# Patient Record
Sex: FEMALE | Race: ASIAN | NOT HISPANIC OR LATINO
[De-identification: names, ages, dates, MRNs, and addresses within clinical notes are randomized per-mention and may not be internally consistent; named-entity substitution may affect disease eponyms.]

---

## 2018-09-05 PROBLEM — Z00.00 ENCOUNTER FOR PREVENTIVE HEALTH EXAMINATION: Status: ACTIVE | Noted: 2018-09-05

## 2018-09-11 ENCOUNTER — APPOINTMENT (OUTPATIENT)
Dept: ORTHOPEDIC SURGERY | Facility: CLINIC | Age: 78
End: 2018-09-11
Payer: MEDICARE

## 2018-09-11 VITALS
HEIGHT: 61 IN | HEART RATE: 66 BPM | WEIGHT: 180 LBS | SYSTOLIC BLOOD PRESSURE: 164 MMHG | DIASTOLIC BLOOD PRESSURE: 72 MMHG | BODY MASS INDEX: 33.99 KG/M2

## 2018-09-11 DIAGNOSIS — S86.011S STRAIN OF RIGHT ACHILLES TENDON, SEQUELA: ICD-10-CM

## 2018-09-11 DIAGNOSIS — M76.61 ACHILLES TENDINITIS, RIGHT LEG: ICD-10-CM

## 2018-09-11 PROCEDURE — 99203 OFFICE O/P NEW LOW 30 MIN: CPT

## 2021-08-10 ENCOUNTER — APPOINTMENT (OUTPATIENT)
Dept: VASCULAR SURGERY | Facility: CLINIC | Age: 81
End: 2021-08-10
Payer: MEDICARE

## 2021-08-10 VITALS — HEART RATE: 82 BPM | SYSTOLIC BLOOD PRESSURE: 146 MMHG | DIASTOLIC BLOOD PRESSURE: 71 MMHG

## 2021-08-10 PROCEDURE — 99203 OFFICE O/P NEW LOW 30 MIN: CPT

## 2021-08-10 NOTE — CONSULT LETTER
[Dear  ___] : Dear  [unfilled], [Consult Letter:] : I had the pleasure of evaluating your patient, [unfilled]. [Please see my note below.] : Please see my note below. [Consult Closing:] : Thank you very much for allowing me to participate in the care of this patient.  If you have any questions, please do not hesitate to contact me. [Sincerely,] : Sincerely, [FreeTextEntry3] : Golden Iniguez M.D., MAYELIN., R.P.V.I.\par \par  [facility]\par  of  Surgery\par Assistant Professor of Radiology\par Vascular Surgery at [place]

## 2021-08-10 NOTE — ASSESSMENT
[FreeTextEntry1] : In the office today I took the opportunity to evaluate the CT scan which shows a slightly enlarged right internal jugular vein.  There is no other pathology present.  I do not believe this is the cause of the patient's neck and jaw pain, most likely this is due to right temporomandibular joint dislocation.  No need for further intervention although, she may benefit from a dental consultation for the jaw issue.

## 2021-08-10 NOTE — HISTORY OF PRESENT ILLNESS
[FreeTextEntry1] : 81-year-old female recently began complaining of right neck pain extending along her right jaw to the back of her head.  This necessitated a CT scan which showed no evidence of mass however, there was an asymmetrically enlarged right internal jugular vein.  In addition, there was degenerative changes of the right temporomandibular joint space with anterior dislocation.  Patient notes that her symptoms have recently begun to improve.  She is here for evaluation.

## 2021-08-10 NOTE — PHYSICAL EXAM
[JVD] : no jugular venous distention  [Normal Breath Sounds] : Normal breath sounds [Normal Rate and Rhythm] : normal rate and rhythm [1+] : left 1+ [Ankle Swelling (On Exam)] : not present [Varicose Veins Of Lower Extremities] : not present [] : not present [Abdomen Tenderness] : ~T ~M No abdominal tenderness [No Rash or Lesion] : No rash or lesion [Skin Ulcer] : no ulcer [Alert] : alert [Calm] : calm [de-identified] : Appears well

## 2022-06-09 ENCOUNTER — APPOINTMENT (OUTPATIENT)
Dept: ORTHOPEDIC SURGERY | Facility: CLINIC | Age: 82
End: 2022-06-09
Payer: MEDICARE

## 2022-06-09 VITALS — WEIGHT: 180 LBS | BODY MASS INDEX: 33.99 KG/M2 | HEIGHT: 61 IN

## 2022-06-09 DIAGNOSIS — E78.00 PURE HYPERCHOLESTEROLEMIA, UNSPECIFIED: ICD-10-CM

## 2022-06-09 PROCEDURE — 99213 OFFICE O/P EST LOW 20 MIN: CPT

## 2022-06-09 RX ORDER — FERROUS GLUCONATE 324(38)MG
324 (38 FE) TABLET ORAL
Refills: 0 | Status: ACTIVE | COMMUNITY

## 2022-06-09 RX ORDER — METFORMIN HYDROCHLORIDE 1000 MG/1
1000 TABLET, COATED ORAL
Refills: 0 | Status: ACTIVE | COMMUNITY

## 2022-06-09 RX ORDER — ACETAMINOPHEN AND CODEINE PHOSPHATE 300; 15 MG/1; MG/1
300-15 TABLET ORAL EVERY 8 HOURS
Qty: 25 | Refills: 0 | Status: ACTIVE | COMMUNITY
Start: 2022-06-09 | End: 1900-01-01

## 2022-06-09 RX ORDER — LOSARTAN POTASSIUM 50 MG/1
50 TABLET, FILM COATED ORAL
Refills: 0 | Status: ACTIVE | COMMUNITY

## 2022-06-09 RX ORDER — BACLOFEN 10 MG/1
10 TABLET ORAL
Refills: 0 | Status: ACTIVE | COMMUNITY

## 2022-06-09 RX ORDER — OMEPRAZOLE 20 MG/1
20 CAPSULE, DELAYED RELEASE ORAL
Refills: 0 | Status: ACTIVE | COMMUNITY

## 2022-06-09 RX ORDER — GLIPIZIDE 5 MG/1
5 TABLET ORAL
Refills: 0 | Status: ACTIVE | COMMUNITY

## 2022-06-09 RX ORDER — ATORVASTATIN CALCIUM 10 MG/1
10 TABLET, FILM COATED ORAL
Refills: 0 | Status: ACTIVE | COMMUNITY

## 2022-06-09 RX ORDER — CHROMIUM 200 MCG
TABLET ORAL
Refills: 0 | Status: ACTIVE | COMMUNITY

## 2022-06-09 RX ORDER — GABAPENTIN 300 MG/1
300 CAPSULE ORAL
Refills: 0 | Status: ACTIVE | COMMUNITY

## 2022-06-09 NOTE — HISTORY OF PRESENT ILLNESS
[6] : 6 [8] : 8 [Shooting] : shooting [] : yes [de-identified] : Patient presents with exacerbation of low back pain for the past month. No new injury. Describes left glut pain that radiates to the left leg. Occasional numbness. Symptoms worse with ambulation. She has been using ice and Aleve/Tylenol with temporary relief. Recent HgA1c was 6.7.\par \par MRI 2021 with severe lumbar disc disease [FreeTextEntry1] : left leg [FreeTextEntry5] : pt is following up on her left leg and states that her pain starts from her top led down to her feet and her calf hurts a lot as well [FreeTextEntry7] : down her left feet [de-identified] : walking and standing

## 2022-06-09 NOTE — DISCUSSION/SUMMARY
[de-identified] : The patient was advised of the diagnosis. The natural history of the pathology was explained in full to the patient in layman's terms. All questions were answered. The risks and benefits of surgical and non-surgical treatment alternatives were explained in full to the patient.\par \par stop baclofen and try tylenol #3. understand to not mix meds. cortisone may be needed for hip. may need new MRI lumbar spine and may need referral to pain management , but will try and avoid  due to diabetes\par \par Timing and frequency of medication has been discussed with patient.\par I have consulted the  registry for the purpose of reviewing the patients controlled substance.\par \par Progress note completed by ANTONIA Brown.\par \par Physician Instructions:\par The documentation recorded by the PA accurately reflects the service I personally performed and decisions made by me. offered and to consider inj left hip, would need BS below 140 to do this .she will try and get to PT.. Limiting Tylenol #2 explained and to never take it within 4 hours of Baclofen\par

## 2022-06-15 ENCOUNTER — APPOINTMENT (OUTPATIENT)
Dept: ORTHOPEDIC SURGERY | Facility: CLINIC | Age: 82
End: 2022-06-15

## 2022-06-15 VITALS — BODY MASS INDEX: 33.99 KG/M2 | HEIGHT: 61 IN | WEIGHT: 180 LBS

## 2022-06-15 PROCEDURE — 99214 OFFICE O/P EST MOD 30 MIN: CPT | Mod: 25

## 2022-06-15 PROCEDURE — 20552 NJX 1/MLT TRIGGER POINT 1/2: CPT

## 2022-06-15 NOTE — PROCEDURE
[FreeTextEntry1] : low back tigger point injection of 2 cc of lidocaine, 4 mg decadron and 5 mg kenalog. risk in diabetic of raising blood suar. Insits her BS today is 85. Her A1c has been 6.7

## 2022-06-15 NOTE — DISCUSSION/SUMMARY
[de-identified] : \par \par Progress note completed by ANTONIA Brown.\par \par Physician Instructions:\par The documentation recorded by the PA accurately reflects the service I personally performed and decisions made by me. offered and to consider inj left hip, would need BS below 140 to do this .she will try and get to PT.. Limiting Tylenol #2 explained and to never take it within 4 hours of Baclofen\par

## 2022-06-15 NOTE — HISTORY OF PRESENT ILLNESS
[Left Leg] : left leg [6] : 6 [8] : 8 [] : yes [Standing] : standing [Walking] : walking [de-identified] : Follow up today. Symptoms continue without change. Notes significant pain at night. She did not  rx at the pharmacy or start PT. states Blood Sugar is 85 and will like trigger point injection [FreeTextEntry7] : left foot [FreeTextEntry1] : back

## 2022-06-15 NOTE — REASON FOR VISIT
[Family Member] : family member [FreeTextEntry2] : Patient is here for a Follow Up appointment for the Back and Left Leg.

## 2022-07-27 ENCOUNTER — APPOINTMENT (OUTPATIENT)
Dept: ORTHOPEDIC SURGERY | Facility: CLINIC | Age: 82
End: 2022-07-27

## 2022-07-28 ENCOUNTER — APPOINTMENT (OUTPATIENT)
Dept: ORTHOPEDIC SURGERY | Facility: CLINIC | Age: 82
End: 2022-07-28

## 2022-08-24 ENCOUNTER — APPOINTMENT (OUTPATIENT)
Dept: ORTHOPEDIC SURGERY | Facility: CLINIC | Age: 82
End: 2022-08-24

## 2022-08-24 VITALS — BODY MASS INDEX: 33.99 KG/M2 | HEIGHT: 61 IN | WEIGHT: 180 LBS

## 2022-08-24 DIAGNOSIS — M70.62 TROCHANTERIC BURSITIS, LEFT HIP: ICD-10-CM

## 2022-08-24 DIAGNOSIS — M51.9 UNSPECIFIED THORACIC, THORACOLUMBAR AND LUMBOSACRAL INTERVERTEBRAL DISC DISORDER: ICD-10-CM

## 2022-08-24 DIAGNOSIS — M54.16 RADICULOPATHY, LUMBAR REGION: ICD-10-CM

## 2022-08-24 PROCEDURE — 99213 OFFICE O/P EST LOW 20 MIN: CPT

## 2022-08-24 NOTE — HISTORY OF PRESENT ILLNESS
[Left Leg] : left leg [10] : 10 [7] : 7 [Dull/Aching] : dull/aching [Radiating] : radiating [Sharp] : sharp [Shooting] : shooting [Tingling] : tingling [Constant] : constant [Leisure] : leisure [Injection therapy] : injection therapy [de-identified] : Follow up today. Symptoms have been exacerbated over the past 2 weeks. No new injury. Describes low back pain with radiation to the left leg with numbness/tingling. She saw Dr. Corona, who treated with LESI on 7/15/22 (roughly 3 weeks of relief). Minimal relief with Tylenol #3. Now getting at home PT once a week. [] : Post Surgical Visit: no [FreeTextEntry1] : Left leg [FreeTextEntry6] : Numbness [FreeTextEntry7] : Left leg

## 2022-08-24 NOTE — DISCUSSION/SUMMARY
[de-identified] : The patient was advised of the diagnosis. The natural history of the pathology was explained in full to the patient in layman's terms. All questions were answered. The risks and benefits of surgical and non-surgical treatment alternatives were explained in full to the patient.\par \par Will follow up with Dr. Corona regarding repeat LESI.. Explained she will likely need 3 epidurals this year. With current pain advised to take the tylenol #3 that I gave her and also to realize risk of pain meds making her dizzy and or constipated . explained to daughter and patient .\par \par Continue Home PT.

## 2022-10-30 ENCOUNTER — EMERGENCY (EMERGENCY)
Facility: HOSPITAL | Age: 82
LOS: 0 days | Discharge: HOME | End: 2022-10-31
Attending: STUDENT IN AN ORGANIZED HEALTH CARE EDUCATION/TRAINING PROGRAM

## 2022-10-30 VITALS
SYSTOLIC BLOOD PRESSURE: 180 MMHG | RESPIRATION RATE: 18 BRPM | TEMPERATURE: 98 F | DIASTOLIC BLOOD PRESSURE: 85 MMHG | HEART RATE: 82 BPM | HEIGHT: 60 IN | WEIGHT: 182.1 LBS | OXYGEN SATURATION: 96 %

## 2022-10-30 DIAGNOSIS — Z88.0 ALLERGY STATUS TO PENICILLIN: ICD-10-CM

## 2022-10-30 DIAGNOSIS — G89.29 OTHER CHRONIC PAIN: ICD-10-CM

## 2022-10-30 DIAGNOSIS — M54.9 DORSALGIA, UNSPECIFIED: ICD-10-CM

## 2022-10-30 DIAGNOSIS — R10.30 LOWER ABDOMINAL PAIN, UNSPECIFIED: ICD-10-CM

## 2022-10-30 DIAGNOSIS — I10 ESSENTIAL (PRIMARY) HYPERTENSION: ICD-10-CM

## 2022-10-30 DIAGNOSIS — R39.15 URGENCY OF URINATION: ICD-10-CM

## 2022-10-30 DIAGNOSIS — M54.32 SCIATICA, LEFT SIDE: ICD-10-CM

## 2022-10-30 DIAGNOSIS — E11.9 TYPE 2 DIABETES MELLITUS WITHOUT COMPLICATIONS: ICD-10-CM

## 2022-10-30 DIAGNOSIS — Z88.8 ALLERGY STATUS TO OTHER DRUGS, MEDICAMENTS AND BIOLOGICAL SUBSTANCES: ICD-10-CM

## 2022-10-30 DIAGNOSIS — R30.0 DYSURIA: ICD-10-CM

## 2022-10-30 DIAGNOSIS — Z20.822 CONTACT WITH AND (SUSPECTED) EXPOSURE TO COVID-19: ICD-10-CM

## 2022-10-30 PROCEDURE — 99285 EMERGENCY DEPT VISIT HI MDM: CPT

## 2022-10-30 RX ORDER — MORPHINE SULFATE 50 MG/1
4 CAPSULE, EXTENDED RELEASE ORAL ONCE
Refills: 0 | Status: DISCONTINUED | OUTPATIENT
Start: 2022-10-30 | End: 2022-10-30

## 2022-10-30 RX ORDER — SODIUM CHLORIDE 9 MG/ML
1000 INJECTION INTRAMUSCULAR; INTRAVENOUS; SUBCUTANEOUS ONCE
Refills: 0 | Status: COMPLETED | OUTPATIENT
Start: 2022-10-30 | End: 2022-10-30

## 2022-10-30 NOTE — ED ADULT NURSE NOTE - OBJECTIVE STATEMENT
A&Ox4, c/o lower left side back pain. Pt states pain for past 2 weeks on left side of back, radiating down left leg. Pt states pain in suprapubic region. Pt states increased frequency, and dribbling noted. Pt states pain has been progressively getting worse. pt denies: pain with urination, fever, chills, nausea, vomiting, weakness, blurry vision, headache, chest pain, sob.

## 2022-10-30 NOTE — ED ADULT TRIAGE NOTE - CHIEF COMPLAINT QUOTE
Patient c/o pain to left flank, lower abdomen, dysuria x 3 days. Denies n/v/d. Pmh sciatica, RA. Epidural 2 months ago.

## 2022-10-31 VITALS
SYSTOLIC BLOOD PRESSURE: 160 MMHG | HEART RATE: 88 BPM | RESPIRATION RATE: 16 BRPM | OXYGEN SATURATION: 95 % | TEMPERATURE: 98 F | DIASTOLIC BLOOD PRESSURE: 79 MMHG

## 2022-10-31 LAB
ALBUMIN SERPL ELPH-MCNC: 4 G/DL — SIGNIFICANT CHANGE UP (ref 3.3–5)
ALP SERPL-CCNC: 59 U/L — SIGNIFICANT CHANGE UP (ref 40–120)
ALT FLD-CCNC: 23 U/L — SIGNIFICANT CHANGE UP (ref 12–78)
ANION GAP SERPL CALC-SCNC: 6 MMOL/L — SIGNIFICANT CHANGE UP (ref 5–17)
APPEARANCE UR: CLEAR — SIGNIFICANT CHANGE UP
AST SERPL-CCNC: 19 U/L — SIGNIFICANT CHANGE UP (ref 15–37)
BASOPHILS # BLD AUTO: 0.06 K/UL — SIGNIFICANT CHANGE UP (ref 0–0.2)
BASOPHILS NFR BLD AUTO: 0.7 % — SIGNIFICANT CHANGE UP (ref 0–2)
BILIRUB SERPL-MCNC: 0.6 MG/DL — SIGNIFICANT CHANGE UP (ref 0.2–1.2)
BILIRUB UR-MCNC: NEGATIVE — SIGNIFICANT CHANGE UP
BUN SERPL-MCNC: 16 MG/DL — SIGNIFICANT CHANGE UP (ref 7–23)
CALCIUM SERPL-MCNC: 9.9 MG/DL — SIGNIFICANT CHANGE UP (ref 8.5–10.1)
CHLORIDE SERPL-SCNC: 108 MMOL/L — SIGNIFICANT CHANGE UP (ref 96–108)
CO2 SERPL-SCNC: 27 MMOL/L — SIGNIFICANT CHANGE UP (ref 22–31)
COLOR SPEC: YELLOW — SIGNIFICANT CHANGE UP
CREAT SERPL-MCNC: 0.82 MG/DL — SIGNIFICANT CHANGE UP (ref 0.5–1.3)
DIFF PNL FLD: NEGATIVE — SIGNIFICANT CHANGE UP
EGFR: 72 ML/MIN/1.73M2 — SIGNIFICANT CHANGE UP
EOSINOPHIL # BLD AUTO: 0.18 K/UL — SIGNIFICANT CHANGE UP (ref 0–0.5)
EOSINOPHIL NFR BLD AUTO: 2 % — SIGNIFICANT CHANGE UP (ref 0–6)
FLUAV AG NPH QL: SIGNIFICANT CHANGE UP
FLUBV AG NPH QL: SIGNIFICANT CHANGE UP
GLUCOSE SERPL-MCNC: 158 MG/DL — HIGH (ref 70–99)
GLUCOSE UR QL: NEGATIVE MG/DL — SIGNIFICANT CHANGE UP
HCT VFR BLD CALC: 39.8 % — SIGNIFICANT CHANGE UP (ref 34.5–45)
HGB BLD-MCNC: 12.3 G/DL — SIGNIFICANT CHANGE UP (ref 11.5–15.5)
IMM GRANULOCYTES NFR BLD AUTO: 0.3 % — SIGNIFICANT CHANGE UP (ref 0–0.9)
KETONES UR-MCNC: NEGATIVE — SIGNIFICANT CHANGE UP
LEUKOCYTE ESTERASE UR-ACNC: NEGATIVE — SIGNIFICANT CHANGE UP
LIDOCAIN IGE QN: 250 U/L — SIGNIFICANT CHANGE UP (ref 73–393)
LYMPHOCYTES # BLD AUTO: 2.47 K/UL — SIGNIFICANT CHANGE UP (ref 1–3.3)
LYMPHOCYTES # BLD AUTO: 27.4 % — SIGNIFICANT CHANGE UP (ref 13–44)
MCHC RBC-ENTMCNC: 26.5 PG — LOW (ref 27–34)
MCHC RBC-ENTMCNC: 30.9 G/DL — LOW (ref 32–36)
MCV RBC AUTO: 85.8 FL — SIGNIFICANT CHANGE UP (ref 80–100)
MONOCYTES # BLD AUTO: 0.57 K/UL — SIGNIFICANT CHANGE UP (ref 0–0.9)
MONOCYTES NFR BLD AUTO: 6.3 % — SIGNIFICANT CHANGE UP (ref 2–14)
NEUTROPHILS # BLD AUTO: 5.71 K/UL — SIGNIFICANT CHANGE UP (ref 1.8–7.4)
NEUTROPHILS NFR BLD AUTO: 63.3 % — SIGNIFICANT CHANGE UP (ref 43–77)
NITRITE UR-MCNC: NEGATIVE — SIGNIFICANT CHANGE UP
NRBC # BLD: 0 /100 WBCS — SIGNIFICANT CHANGE UP (ref 0–0)
PH UR: 6.5 — SIGNIFICANT CHANGE UP (ref 5–8)
PLATELET # BLD AUTO: 338 K/UL — SIGNIFICANT CHANGE UP (ref 150–400)
POTASSIUM SERPL-MCNC: 4.5 MMOL/L — SIGNIFICANT CHANGE UP (ref 3.5–5.3)
POTASSIUM SERPL-SCNC: 4.5 MMOL/L — SIGNIFICANT CHANGE UP (ref 3.5–5.3)
PROT SERPL-MCNC: 7.4 GM/DL — SIGNIFICANT CHANGE UP (ref 6–8.3)
PROT UR-MCNC: NEGATIVE MG/DL — SIGNIFICANT CHANGE UP
RBC # BLD: 4.64 M/UL — SIGNIFICANT CHANGE UP (ref 3.8–5.2)
RBC # FLD: 15.2 % — HIGH (ref 10.3–14.5)
SARS-COV-2 RNA SPEC QL NAA+PROBE: SIGNIFICANT CHANGE UP
SODIUM SERPL-SCNC: 141 MMOL/L — SIGNIFICANT CHANGE UP (ref 135–145)
SP GR SPEC: 1.01 — SIGNIFICANT CHANGE UP (ref 1.01–1.02)
UROBILINOGEN FLD QL: NEGATIVE MG/DL — SIGNIFICANT CHANGE UP
WBC # BLD: 9.02 K/UL — SIGNIFICANT CHANGE UP (ref 3.8–10.5)
WBC # FLD AUTO: 9.02 K/UL — SIGNIFICANT CHANGE UP (ref 3.8–10.5)

## 2022-10-31 PROCEDURE — 74177 CT ABD & PELVIS W/CONTRAST: CPT | Mod: 26,MA

## 2022-10-31 RX ORDER — ACETAMINOPHEN 500 MG
2 TABLET ORAL
Qty: 40 | Refills: 0
Start: 2022-10-31 | End: 2022-11-04

## 2022-10-31 RX ORDER — LIDOCAINE 4 G/100G
1 CREAM TOPICAL
Qty: 15 | Refills: 0
Start: 2022-10-31 | End: 2022-11-04

## 2022-10-31 RX ORDER — TIZANIDINE 4 MG/1
2 TABLET ORAL
Qty: 30 | Refills: 0
Start: 2022-10-31 | End: 2022-11-04

## 2022-10-31 RX ORDER — NITROFURANTOIN MACROCRYSTAL 50 MG
1 CAPSULE ORAL
Qty: 10 | Refills: 0
Start: 2022-10-31 | End: 2022-11-04

## 2022-10-31 RX ADMIN — MORPHINE SULFATE 4 MILLIGRAM(S): 50 CAPSULE, EXTENDED RELEASE ORAL at 00:25

## 2022-10-31 RX ADMIN — SODIUM CHLORIDE 1000 MILLILITER(S): 9 INJECTION INTRAMUSCULAR; INTRAVENOUS; SUBCUTANEOUS at 00:25

## 2022-10-31 NOTE — ED PROVIDER NOTE - OBJECTIVE STATEMENT
82-year-old female with past medical history of diabetes, chronic back pain sciatica RA presented to the ED with lower abdominal pain with associated urinary urgency and dysuria times several days without associated fever chills nausea vomiting diarrhea.  Patient denies any flank pain or back pain.  Patient also reports left buttock sciatica flare acute on chronic radiating to posterior left leg to just behind left knee.  Patient denies any other complaints.  .

## 2022-10-31 NOTE — ED PROVIDER NOTE - NS ED ROS FT
Constitutional: See HPI.  Eyes: No visual changes, eye pain or discharge. No Photophobia  ENMT: No hearing changes, pain, discharge or infections. No neck pain or stiffness. No limited ROM  Cardiac: No SOB or edema. No chest pain with exertion.  Respiratory: No cough or respiratory distress.   GI: see hpi   : No dysuria, frequency or burning. No Discharge  MS: see hpi   Neuro: No headache or weakness. No LOC.  Skin: No skin rash.  Except as documented in the HPI, all other systems are negative.

## 2022-10-31 NOTE — ED PROVIDER NOTE - PHYSICAL EXAMINATION
VITAL SIGNS: I have reviewed nursing notes and confirm.  CONSTITUTIONAL: well-appearing, non-toxic, NAD  SKIN: Warm dry, normal skin turgor  HEAD: NCAT  EYES: EOMI, PERRLA, no scleral icterus  ENT: Moist mucous membranes, normal pharynx with no erythema or exudates  NECK: Supple; non tender. Full ROM. No cervical LAD  CARD: RRR, no murmurs, rubs or gallops  RESP: clear to ausculation b/l.  No rales, rhonchi, or wheezing.  ABD: soft, + BS, suprapubic ttp, non-distended, no rebound or guarding. No CVA tenderness  EXT: left mid-buttock focal ttp   NEURO: normal motor. normal sensory. non-focal   PSYCH: Cooperative, appropriate.

## 2022-10-31 NOTE — ED PROVIDER NOTE - PATIENT PORTAL LINK FT
You can access the FollowMyHealth Patient Portal offered by [facility] by registering at the following website: [email or web address]. By joining [employer]’s FollowMyHealth portal, you will also be able to view your health information using other applications (apps) compatible with our system.

## 2022-10-31 NOTE — ED PROVIDER NOTE - NSFOLLOWUPINSTRUCTIONS_ED_ALL_ED_FT
You have a history of sciatica and should continue to take your home medication as prescribed. Please follow up with your outpatient physician

## 2022-10-31 NOTE — ED PROVIDER NOTE - CLINICAL SUMMARY MEDICAL DECISION MAKING FREE TEXT BOX
82-year-old female with past medical history of diabetes, chronic back pain sciatica RA presented to the ED with lower abdominal pain with associated urinary urgency and dysuria times several days without associated fever chills nausea vomiting diarrhea.  Patient denies any flank pain or back pain.  Patient also reports left buttock sciatica flare acute on chronic radiating to posterior left leg to just behind left knee.  Patient denies any other complaints.  .Likely UTI, will obtain CT abdomen pelvis for further evaluation given age and risk factors.  Acute on chronic flare of sciatica which patient has previously had.  Follows up with pain management as outpatient.

## 2022-11-01 LAB
CULTURE RESULTS: SIGNIFICANT CHANGE UP
SPECIMEN SOURCE: SIGNIFICANT CHANGE UP

## 2022-11-07 PROBLEM — I10 ESSENTIAL (PRIMARY) HYPERTENSION: Chronic | Status: ACTIVE | Noted: 2022-10-30

## 2022-11-07 PROBLEM — E11.9 TYPE 2 DIABETES MELLITUS WITHOUT COMPLICATIONS: Chronic | Status: ACTIVE | Noted: 2022-10-30

## 2022-11-18 ENCOUNTER — APPOINTMENT (OUTPATIENT)
Dept: UROGYNECOLOGY | Facility: CLINIC | Age: 82
End: 2022-11-18

## 2022-11-18 VITALS
DIASTOLIC BLOOD PRESSURE: 82 MMHG | BODY MASS INDEX: 35.34 KG/M2 | SYSTOLIC BLOOD PRESSURE: 158 MMHG | HEIGHT: 60 IN | WEIGHT: 180 LBS

## 2022-11-18 DIAGNOSIS — N32.81 OVERACTIVE BLADDER: ICD-10-CM

## 2022-11-18 DIAGNOSIS — K59.09 OTHER CONSTIPATION: ICD-10-CM

## 2022-11-18 LAB
BILIRUB UR QL STRIP: NORMAL
CLARITY UR: CLEAR
COLLECTION METHOD: NORMAL
GLUCOSE UR-MCNC: NORMAL
HCG UR QL: 0.2 EU/DL
HGB UR QL STRIP.AUTO: NORMAL
KETONES UR-MCNC: NORMAL
LEUKOCYTE ESTERASE UR QL STRIP: NORMAL
NITRITE UR QL STRIP: NORMAL
PH UR STRIP: 7
PROT UR STRIP-MCNC: NORMAL
SP GR UR STRIP: 1.01

## 2022-11-18 PROCEDURE — 51701 INSERT BLADDER CATHETER: CPT

## 2022-11-18 PROCEDURE — 99204 OFFICE O/P NEW MOD 45 MIN: CPT | Mod: 25

## 2022-11-18 RX ORDER — TROSPIUM CHLORIDE 60 MG/1
60 CAPSULE, EXTENDED RELEASE ORAL
Qty: 30 | Refills: 3 | Status: ACTIVE | COMMUNITY
Start: 2022-11-18 | End: 1900-01-01

## 2022-11-18 NOTE — HISTORY OF PRESENT ILLNESS
[FreeTextEntry1] : VIKTORIYA PHILLIPS  is a 82 year old P2 presenting for evaluation of urinary frequency, and pain in the pelvis/bladder x 1 month. Reports pain is sometimes related to urgency and sometimes not related. Has pain when she is sitting. Day and night. She also has significant frequency, particularly at night. She voids >7 times during the daytime and up to 5 times during the night. She drinks about 4-5 glasses of water daily, 1.5 cups of caffeinated tea, yogurt, and eat spicy foods. She drink tea in the evening as well. She reports occasional constipation but takes daily herbal stool softener. Occasional DERRELL. No dysuria, hematuria, recurrent UTIs.

## 2022-11-18 NOTE — DISCUSSION/SUMMARY
[FreeTextEntry1] : - Unclear etiology of pain, possible musculoskeletal vs actually bladder pain - Discussed etiology of OAB and behavioral modifications: avoidance of caffeine, spicy food, cessation of fluid intake 2 hours before bedtime. - Discussed medical management and will start Trospium. Side effects discussed and strategies to manage include stool softener/bowel regimen for constipation, eye drop for dry eye. No contraindications to anticholinergics.  - Follow-up in 1 month to re-assess after starting medication and behavioral changes. Aware to call earlier with any questions or concerns.

## 2022-11-18 NOTE — PROCEDURE
[Straight Catheterization] : insertion of a straight catheter [Urgent Incontinence] : urgent incontinence [Urinary Frequency] : urinary frequency [Patient] : the patient [Allergies Reviewed] : Allergies reviewed [None] : none [___ Fr Straight Tip] : a [unfilled] in Ecu[place]orean straight tip catheter

## 2022-11-18 NOTE — PHYSICAL EXAM
[FreeTextEntry1] : General: Well, appearing, no acute distress\par HEENT: Normocephalic, atraumatic\par Respiratory: Speaking in full sentences comfortably, normal work of breathing and no cough during visit\par Extremities: No upper extremity edema noted\par Skin: No obvious rash or skin lesions\par Neuro: Alert and oriented x 3, speech is fluent, normal rate\par Psych: Normal mood and affect [Vulvar Atrophy] : vulvar atrophy [Labia Majora] : were normal [Labia Minora] : were normal [Normal Appearance] : general appearance was normal [Atrophy] : atrophy [Dry Mucosa] : dry mucosa [Normal] : normal [Post Void Residual ____ml] : post void residual was [unfilled] ml [de-identified] : None [de-identified] : Has some suprapubic, lower abdominal pain and pain on palpation of pubic bone.

## 2022-12-23 ENCOUNTER — APPOINTMENT (OUTPATIENT)
Dept: UROGYNECOLOGY | Facility: CLINIC | Age: 82
End: 2022-12-23

## 2023-06-21 ENCOUNTER — APPOINTMENT (OUTPATIENT)
Dept: ENDOCRINOLOGY | Facility: CLINIC | Age: 83
End: 2023-06-21

## 2023-06-21 ENCOUNTER — APPOINTMENT (OUTPATIENT)
Dept: ENDOCRINOLOGY | Facility: CLINIC | Age: 83
End: 2023-06-21
Payer: MEDICARE

## 2023-06-21 VITALS
HEIGHT: 60 IN | OXYGEN SATURATION: 94 % | TEMPERATURE: 96 F | DIASTOLIC BLOOD PRESSURE: 70 MMHG | HEART RATE: 74 BPM | BODY MASS INDEX: 33.96 KG/M2 | SYSTOLIC BLOOD PRESSURE: 120 MMHG | WEIGHT: 173 LBS

## 2023-06-21 LAB
GLUCOSE BLDC GLUCOMTR-MCNC: 58
HBA1C MFR BLD HPLC: 6.4

## 2023-06-21 PROCEDURE — 83036 HEMOGLOBIN GLYCOSYLATED A1C: CPT | Mod: QW

## 2023-06-21 PROCEDURE — 99205 OFFICE O/P NEW HI 60 MIN: CPT

## 2023-06-21 PROCEDURE — 82962 GLUCOSE BLOOD TEST: CPT

## 2023-06-21 RX ORDER — LANCETS
EACH MISCELLANEOUS
Qty: 180 | Refills: 0 | Status: ACTIVE | COMMUNITY
Start: 2023-06-21 | End: 1900-01-01

## 2023-06-21 RX ORDER — BLOOD SUGAR DIAGNOSTIC
STRIP MISCELLANEOUS
Qty: 90 | Refills: 3 | Status: ACTIVE | COMMUNITY
Start: 2023-06-21 | End: 1900-01-01

## 2023-06-21 NOTE — ASSESSMENT
[FreeTextEntry1] : #Type 2 diabetes\par -Patient presenting with longstanding history of type 2 diabetes.  She is currently on metformin 1000 mg twice daily along with glipizide 10 mg daily.  Her blood sugars are running on the low side.  She mentions that sugars are occasionally in the 50s in the morning.\par -Hemoglobin A1c is 6.4% today.\par -Likely patient is averaging out low and high blood sugars.  Given her age, would prefer her fasting blood sugars to be higher.  Ideally would like to stop her glipizide altogether however patient is hesitant to stop her medication as she states that she does not feel good when she stops it. \par Plan:\par -For now, can decrease glipizide down to 5 mg daily. If AM BG continues to be <70, stop glipizide all together.\par -Continue with metformin 1000 mg twice daily\par -We will place a freestyle ned pro to get a better insight into her blood sugar readings throughout the day for the next 2 weeks\par -She is also having significant neuropathy in her feet which may likely be related to her diabetes.  Her diabetes however is well controlled, would refer patient to neurologist as she is already on gabapentin and is looking for other options.\par \par Patient counseled extensively about the complications of diabetes including but not limited to nephropathy, neuropathy, and retinopathy. We discussed the importance of annual foot and optho exams. Explained that ideally blood sugars in the morning prior to breakfast should be between 80 and 130. Blood sugars should be checked 2 hours after eating and should be <180. If blood sugar is <70, patient should treat the blood sugar FIRST and then contact provider. Advised patient to let us know if BG persistently <70 or >200\par \par #Hyperlipidemia\par -Continue with statin\par \par #Hypertension\par -Blood pressures well controlled.  Continue with losartan

## 2023-06-21 NOTE — ADDENDUM
[FreeTextEntry1] : LibrePro Placement:\par RN placed FreeStyle Barbara professional sensor on pt. arm as per Dr. Garcia . Sensor placed on ( 6/21/23 )    . Reason for sensor placement and information collected explained to patient. Sensor placed on Left  arm without any issues.Patient instructed on removal and return of sensor in 2 weeks to the practice. Patient informed that once sensor is returned and the data is downloaded, patient will be called by a practitioner with report and recommendations. Pt. made aware and verbalized understanding. \par Barbara Sensor Serial #  [id number]\par Expiration:07/31/23\par Gypsy Johnson RN\par

## 2023-06-21 NOTE — HISTORY OF PRESENT ILLNESS
[FreeTextEntry1] : #Diabetes Mellitus Type 2    \par \par Diagnosis- 2007\par Current regimen: MFM 1g BID + glipizide 10mg daily\par \par Other diabetic medications discontinued in the past:\par 1. Januvia - didn't work \par 2. Jardiance - didn't work \par PCP/prior endocrinologist: denies\par Signs/symptoms: denies \par Last HgbA1c: 6.4%\par Home blood sugars: fasting 50s \par Hypoglycemia: yes \par History of gestational diabetes: denies \par \par History of CAD: deneis\par History of CVA: deneis \par FH of diabetes: brother\par \par \par Diet: \par Breakfast: roti + Uzbek snack\par Lunch: fruit + left overs\par Dinner:roti toshia rice \par Drinks: Denies \par \par eGFR: 60     Alb/creat: -\par Follow with nephrology? -\par \par AST: 18\par ALT: 16\par \par Last eye exam: Last year \par Evidence of retinopathy? denies\par \par Last foot exam: UTD\par Any issues? ++ neuropathy \par \par Surgical history:\par Bariatric surgery? \par \par #HLD\par lipitor 10\par \par #HTN\par - Patient is on Losartan\par - BP today is 120/70

## 2023-07-10 ENCOUNTER — NON-APPOINTMENT (OUTPATIENT)
Age: 83
End: 2023-07-10

## 2023-07-19 ENCOUNTER — NON-APPOINTMENT (OUTPATIENT)
Age: 83
End: 2023-07-19

## 2023-11-09 ENCOUNTER — OUTPATIENT (OUTPATIENT)
Dept: OUTPATIENT SERVICES | Facility: HOSPITAL | Age: 83
LOS: 1 days | Discharge: HOME | End: 2023-11-09
Payer: MEDICARE

## 2023-11-09 ENCOUNTER — APPOINTMENT (OUTPATIENT)
Dept: RADIOLOGY | Facility: HOSPITAL | Age: 83
End: 2023-11-09

## 2023-11-09 DIAGNOSIS — Z02.2 ENCOUNTER FOR EXAMINATION FOR ADMISSION TO RESIDENTIAL INSTITUTION: ICD-10-CM

## 2023-11-09 PROCEDURE — 74240 X-RAY XM UPR GI TRC 1CNTRST: CPT | Mod: 26

## 2023-11-29 ENCOUNTER — APPOINTMENT (OUTPATIENT)
Dept: ENDOCRINOLOGY | Facility: CLINIC | Age: 83
End: 2023-11-29
Payer: MEDICARE

## 2023-11-29 VITALS
DIASTOLIC BLOOD PRESSURE: 70 MMHG | HEART RATE: 78 BPM | BODY MASS INDEX: 32.98 KG/M2 | SYSTOLIC BLOOD PRESSURE: 110 MMHG | WEIGHT: 168 LBS | TEMPERATURE: 97 F | HEIGHT: 60 IN | OXYGEN SATURATION: 94 %

## 2023-11-29 DIAGNOSIS — E78.5 HYPERLIPIDEMIA, UNSPECIFIED: ICD-10-CM

## 2023-11-29 DIAGNOSIS — I10 ESSENTIAL (PRIMARY) HYPERTENSION: ICD-10-CM

## 2023-11-29 DIAGNOSIS — E11.9 TYPE 2 DIABETES MELLITUS W/OUT COMPLICATIONS: ICD-10-CM

## 2023-11-29 LAB
GLUCOSE BLDC GLUCOMTR-MCNC: 84
HBA1C MFR BLD HPLC: 7.2

## 2023-11-29 PROCEDURE — 99215 OFFICE O/P EST HI 40 MIN: CPT | Mod: 25

## 2023-11-29 PROCEDURE — 82962 GLUCOSE BLOOD TEST: CPT

## 2023-11-29 PROCEDURE — 83036 HEMOGLOBIN GLYCOSYLATED A1C: CPT | Mod: QW

## 2023-12-01 PROBLEM — I10 HYPERTENSION: Status: ACTIVE | Noted: 2022-06-09

## 2023-12-01 PROBLEM — E11.9 DIABETES: Status: ACTIVE | Noted: 2022-06-09

## 2023-12-01 PROBLEM — E78.5 HYPERLIPIDEMIA: Status: ACTIVE | Noted: 2023-06-21

## 2024-06-05 ENCOUNTER — RESULT REVIEW (OUTPATIENT)
Age: 84
End: 2024-06-05

## 2024-06-05 ENCOUNTER — INPATIENT (INPATIENT)
Facility: HOSPITAL | Age: 84
LOS: 7 days | Discharge: NURSING, REHAB OR LONG-TERM CARE | DRG: 66 | End: 2024-06-13
Attending: PSYCHIATRY & NEUROLOGY | Admitting: STUDENT IN AN ORGANIZED HEALTH CARE EDUCATION/TRAINING PROGRAM
Payer: MEDICARE

## 2024-06-05 ENCOUNTER — EMERGENCY (EMERGENCY)
Facility: HOSPITAL | Age: 84
LOS: 0 days | Discharge: ANOTHER HOSPITAL | End: 2024-06-05
Attending: EMERGENCY MEDICINE
Payer: MEDICARE

## 2024-06-05 ENCOUNTER — TRANSCRIPTION ENCOUNTER (OUTPATIENT)
Age: 84
End: 2024-06-05

## 2024-06-05 VITALS
HEART RATE: 98 BPM | OXYGEN SATURATION: 99 % | SYSTOLIC BLOOD PRESSURE: 150 MMHG | RESPIRATION RATE: 17 BRPM | DIASTOLIC BLOOD PRESSURE: 107 MMHG

## 2024-06-05 VITALS
HEIGHT: 65 IN | WEIGHT: 184.53 LBS | TEMPERATURE: 97 F | SYSTOLIC BLOOD PRESSURE: 180 MMHG | RESPIRATION RATE: 20 BRPM | HEART RATE: 65 BPM | DIASTOLIC BLOOD PRESSURE: 72 MMHG | OXYGEN SATURATION: 98 %

## 2024-06-05 VITALS
OXYGEN SATURATION: 99 % | DIASTOLIC BLOOD PRESSURE: 138 MMHG | RESPIRATION RATE: 17 BRPM | SYSTOLIC BLOOD PRESSURE: 149 MMHG | HEART RATE: 73 BPM

## 2024-06-05 DIAGNOSIS — E78.5 HYPERLIPIDEMIA, UNSPECIFIED: ICD-10-CM

## 2024-06-05 DIAGNOSIS — Z88.0 ALLERGY STATUS TO PENICILLIN: ICD-10-CM

## 2024-06-05 DIAGNOSIS — I10 ESSENTIAL (PRIMARY) HYPERTENSION: ICD-10-CM

## 2024-06-05 DIAGNOSIS — E11.40 TYPE 2 DIABETES MELLITUS WITH DIABETIC NEUROPATHY, UNSPECIFIED: ICD-10-CM

## 2024-06-05 DIAGNOSIS — R53.1 WEAKNESS: ICD-10-CM

## 2024-06-05 DIAGNOSIS — Z79.82 LONG TERM (CURRENT) USE OF ASPIRIN: ICD-10-CM

## 2024-06-05 DIAGNOSIS — I61.0 NONTRAUMATIC INTRACEREBRAL HEMORRHAGE IN HEMISPHERE, SUBCORTICAL: ICD-10-CM

## 2024-06-05 DIAGNOSIS — Z79.84 LONG TERM (CURRENT) USE OF ORAL HYPOGLYCEMIC DRUGS: ICD-10-CM

## 2024-06-05 LAB
ALBUMIN SERPL ELPH-MCNC: 3.9 G/DL — SIGNIFICANT CHANGE UP (ref 3.3–5)
ALBUMIN SERPL ELPH-MCNC: 4.2 G/DL — SIGNIFICANT CHANGE UP (ref 3.3–5)
ALP SERPL-CCNC: 58 U/L — SIGNIFICANT CHANGE UP (ref 40–120)
ALP SERPL-CCNC: 63 U/L — SIGNIFICANT CHANGE UP (ref 40–120)
ALT FLD-CCNC: 14 U/L — SIGNIFICANT CHANGE UP (ref 10–45)
ALT FLD-CCNC: 21 U/L — SIGNIFICANT CHANGE UP (ref 12–78)
AMMONIA BLD-MCNC: 26 UMOL/L — SIGNIFICANT CHANGE UP (ref 11–32)
ANION GAP SERPL CALC-SCNC: 14 MMOL/L — SIGNIFICANT CHANGE UP (ref 5–17)
ANION GAP SERPL CALC-SCNC: 17 MMOL/L — SIGNIFICANT CHANGE UP (ref 5–17)
ANION GAP SERPL CALC-SCNC: 7 MMOL/L — SIGNIFICANT CHANGE UP (ref 5–17)
APPEARANCE UR: ABNORMAL
APTT BLD: 29.5 SEC — SIGNIFICANT CHANGE UP (ref 24.5–35.6)
APTT BLD: 31 SEC — SIGNIFICANT CHANGE UP (ref 24.5–35.6)
AST SERPL-CCNC: 18 U/L — SIGNIFICANT CHANGE UP (ref 15–37)
AST SERPL-CCNC: 19 U/L — SIGNIFICANT CHANGE UP (ref 10–40)
BACTERIA # UR AUTO: NEGATIVE /HPF — SIGNIFICANT CHANGE UP
BASOPHILS # BLD AUTO: 0.06 K/UL — SIGNIFICANT CHANGE UP (ref 0–0.2)
BASOPHILS # BLD AUTO: 0.08 K/UL — SIGNIFICANT CHANGE UP (ref 0–0.2)
BASOPHILS NFR BLD AUTO: 0.5 % — SIGNIFICANT CHANGE UP (ref 0–2)
BASOPHILS NFR BLD AUTO: 0.7 % — SIGNIFICANT CHANGE UP (ref 0–2)
BILIRUB SERPL-MCNC: 0.8 MG/DL — SIGNIFICANT CHANGE UP (ref 0.2–1.2)
BILIRUB SERPL-MCNC: 0.9 MG/DL — SIGNIFICANT CHANGE UP (ref 0.2–1.2)
BILIRUB UR-MCNC: NEGATIVE — SIGNIFICANT CHANGE UP
BLD GP AB SCN SERPL QL: SIGNIFICANT CHANGE UP
BUN SERPL-MCNC: 14 MG/DL — SIGNIFICANT CHANGE UP (ref 7–23)
BUN SERPL-MCNC: 16 MG/DL — SIGNIFICANT CHANGE UP (ref 7–23)
BUN SERPL-MCNC: 17 MG/DL — SIGNIFICANT CHANGE UP (ref 7–23)
CALCIUM SERPL-MCNC: 8.8 MG/DL — SIGNIFICANT CHANGE UP (ref 8.4–10.5)
CALCIUM SERPL-MCNC: 8.8 MG/DL — SIGNIFICANT CHANGE UP (ref 8.5–10.1)
CALCIUM SERPL-MCNC: 9.3 MG/DL — SIGNIFICANT CHANGE UP (ref 8.4–10.5)
CAST: 5 /LPF — HIGH (ref 0–4)
CHLORIDE SERPL-SCNC: 100 MMOL/L — SIGNIFICANT CHANGE UP (ref 96–108)
CHLORIDE SERPL-SCNC: 101 MMOL/L — SIGNIFICANT CHANGE UP (ref 96–108)
CHLORIDE SERPL-SCNC: 106 MMOL/L — SIGNIFICANT CHANGE UP (ref 96–108)
CHOLEST SERPL-MCNC: 106 MG/DL — SIGNIFICANT CHANGE UP
CO2 SERPL-SCNC: 20 MMOL/L — LOW (ref 22–31)
CO2 SERPL-SCNC: 21 MMOL/L — LOW (ref 22–31)
CO2 SERPL-SCNC: 26 MMOL/L — SIGNIFICANT CHANGE UP (ref 22–31)
COLOR SPEC: YELLOW — SIGNIFICANT CHANGE UP
CREAT SERPL-MCNC: 0.65 MG/DL — SIGNIFICANT CHANGE UP (ref 0.5–1.3)
CREAT SERPL-MCNC: 0.67 MG/DL — SIGNIFICANT CHANGE UP (ref 0.5–1.3)
CREAT SERPL-MCNC: 0.86 MG/DL — SIGNIFICANT CHANGE UP (ref 0.5–1.3)
DIFF PNL FLD: ABNORMAL
EGFR: 68 ML/MIN/1.73M2 — SIGNIFICANT CHANGE UP
EGFR: 88 ML/MIN/1.73M2 — SIGNIFICANT CHANGE UP
EGFR: 88 ML/MIN/1.73M2 — SIGNIFICANT CHANGE UP
EOSINOPHIL # BLD AUTO: 0.07 K/UL — SIGNIFICANT CHANGE UP (ref 0–0.5)
EOSINOPHIL # BLD AUTO: 0.16 K/UL — SIGNIFICANT CHANGE UP (ref 0–0.5)
EOSINOPHIL NFR BLD AUTO: 0.5 % — SIGNIFICANT CHANGE UP (ref 0–6)
EOSINOPHIL NFR BLD AUTO: 1.8 % — SIGNIFICANT CHANGE UP (ref 0–6)
GLUCOSE BLDC GLUCOMTR-MCNC: 126 MG/DL — HIGH (ref 70–99)
GLUCOSE BLDC GLUCOMTR-MCNC: 193 MG/DL — HIGH (ref 70–99)
GLUCOSE SERPL-MCNC: 145 MG/DL — HIGH (ref 70–99)
GLUCOSE SERPL-MCNC: 189 MG/DL — HIGH (ref 70–99)
GLUCOSE SERPL-MCNC: 191 MG/DL — HIGH (ref 70–99)
GLUCOSE UR QL: NEGATIVE MG/DL — SIGNIFICANT CHANGE UP
HCT VFR BLD CALC: 36.9 % — SIGNIFICANT CHANGE UP (ref 34.5–45)
HCT VFR BLD CALC: 38.6 % — SIGNIFICANT CHANGE UP (ref 34.5–45)
HDLC SERPL-MCNC: 50 MG/DL — LOW
HGB BLD-MCNC: 11.6 G/DL — SIGNIFICANT CHANGE UP (ref 11.5–15.5)
HGB BLD-MCNC: 12.1 G/DL — SIGNIFICANT CHANGE UP (ref 11.5–15.5)
IMM GRANULOCYTES NFR BLD AUTO: 1.2 % — HIGH (ref 0–0.9)
IMM GRANULOCYTES NFR BLD AUTO: 1.6 % — HIGH (ref 0–0.9)
INR BLD: 0.97 RATIO — SIGNIFICANT CHANGE UP (ref 0.85–1.18)
INR BLD: 1.11 RATIO — SIGNIFICANT CHANGE UP (ref 0.85–1.18)
KETONES UR-MCNC: ABNORMAL MG/DL
LACTATE SERPL-SCNC: 2 MMOL/L — SIGNIFICANT CHANGE UP (ref 0.7–2)
LEUKOCYTE ESTERASE UR-ACNC: NEGATIVE — SIGNIFICANT CHANGE UP
LIPID PNL WITH DIRECT LDL SERPL: 32 MG/DL — SIGNIFICANT CHANGE UP
LYMPHOCYTES # BLD AUTO: 1.88 K/UL — SIGNIFICANT CHANGE UP (ref 1–3.3)
LYMPHOCYTES # BLD AUTO: 12.6 % — LOW (ref 13–44)
LYMPHOCYTES # BLD AUTO: 4.46 K/UL — HIGH (ref 1–3.3)
LYMPHOCYTES # BLD AUTO: 50.6 % — HIGH (ref 13–44)
MAGNESIUM SERPL-MCNC: 1.6 MG/DL — SIGNIFICANT CHANGE UP (ref 1.6–2.6)
MCHC RBC-ENTMCNC: 26.3 PG — LOW (ref 27–34)
MCHC RBC-ENTMCNC: 26.4 PG — LOW (ref 27–34)
MCHC RBC-ENTMCNC: 31.3 GM/DL — LOW (ref 32–36)
MCHC RBC-ENTMCNC: 31.4 G/DL — LOW (ref 32–36)
MCV RBC AUTO: 83.9 FL — SIGNIFICANT CHANGE UP (ref 80–100)
MCV RBC AUTO: 83.9 FL — SIGNIFICANT CHANGE UP (ref 80–100)
MONOCYTES # BLD AUTO: 0.66 K/UL — SIGNIFICANT CHANGE UP (ref 0–0.9)
MONOCYTES # BLD AUTO: 0.78 K/UL — SIGNIFICANT CHANGE UP (ref 0–0.9)
MONOCYTES NFR BLD AUTO: 5.2 % — SIGNIFICANT CHANGE UP (ref 2–14)
MONOCYTES NFR BLD AUTO: 7.5 % — SIGNIFICANT CHANGE UP (ref 2–14)
NEUTROPHILS # BLD AUTO: 11.97 K/UL — HIGH (ref 1.8–7.4)
NEUTROPHILS # BLD AUTO: 3.33 K/UL — SIGNIFICANT CHANGE UP (ref 1.8–7.4)
NEUTROPHILS NFR BLD AUTO: 37.8 % — LOW (ref 43–77)
NEUTROPHILS NFR BLD AUTO: 80 % — HIGH (ref 43–77)
NITRITE UR-MCNC: NEGATIVE — SIGNIFICANT CHANGE UP
NON HDL CHOLESTEROL: 56 MG/DL — SIGNIFICANT CHANGE UP
NRBC # BLD: 0 /100 WBCS — SIGNIFICANT CHANGE UP (ref 0–0)
NRBC # BLD: 0 /100 WBCS — SIGNIFICANT CHANGE UP (ref 0–0)
PA ADP PRP-ACNC: 230 PRU — SIGNIFICANT CHANGE UP (ref 194–417)
PH UR: >=9 (ref 5–8)
PHOSPHATE SERPL-MCNC: 3.6 MG/DL — SIGNIFICANT CHANGE UP (ref 2.5–4.5)
PLATELET # BLD AUTO: 292 K/UL — SIGNIFICANT CHANGE UP (ref 150–400)
PLATELET # BLD AUTO: 297 K/UL — SIGNIFICANT CHANGE UP (ref 150–400)
PLATELET RESPONSE ASPIRIN RESULT: 448 ARU — SIGNIFICANT CHANGE UP
POTASSIUM SERPL-MCNC: 4.1 MMOL/L — SIGNIFICANT CHANGE UP (ref 3.5–5.3)
POTASSIUM SERPL-MCNC: 4.2 MMOL/L — SIGNIFICANT CHANGE UP (ref 3.5–5.3)
POTASSIUM SERPL-MCNC: 4.5 MMOL/L — SIGNIFICANT CHANGE UP (ref 3.5–5.3)
POTASSIUM SERPL-SCNC: 4.1 MMOL/L — SIGNIFICANT CHANGE UP (ref 3.5–5.3)
POTASSIUM SERPL-SCNC: 4.2 MMOL/L — SIGNIFICANT CHANGE UP (ref 3.5–5.3)
POTASSIUM SERPL-SCNC: 4.5 MMOL/L — SIGNIFICANT CHANGE UP (ref 3.5–5.3)
PROT SERPL-MCNC: 7.2 GM/DL — SIGNIFICANT CHANGE UP (ref 6–8.3)
PROT SERPL-MCNC: 7.5 G/DL — SIGNIFICANT CHANGE UP (ref 6–8.3)
PROT UR-MCNC: 30 MG/DL
PROTHROM AB SERPL-ACNC: 11.6 SEC — SIGNIFICANT CHANGE UP (ref 9.5–13)
PROTHROM AB SERPL-ACNC: 11.6 SEC — SIGNIFICANT CHANGE UP (ref 9.5–13)
RBC # BLD: 4.4 M/UL — SIGNIFICANT CHANGE UP (ref 3.8–5.2)
RBC # BLD: 4.6 M/UL — SIGNIFICANT CHANGE UP (ref 3.8–5.2)
RBC # FLD: 15.2 % — HIGH (ref 10.3–14.5)
RBC # FLD: 15.4 % — HIGH (ref 10.3–14.5)
RBC CASTS # UR COMP ASSIST: 48 /HPF — HIGH (ref 0–4)
REVIEW: SIGNIFICANT CHANGE UP
SODIUM SERPL-SCNC: 136 MMOL/L — SIGNIFICANT CHANGE UP (ref 135–145)
SODIUM SERPL-SCNC: 137 MMOL/L — SIGNIFICANT CHANGE UP (ref 135–145)
SODIUM SERPL-SCNC: 139 MMOL/L — SIGNIFICANT CHANGE UP (ref 135–145)
SP GR SPEC: 1.02 — SIGNIFICANT CHANGE UP (ref 1–1.03)
SQUAMOUS # UR AUTO: 0 /HPF — SIGNIFICANT CHANGE UP (ref 0–5)
TRIGL SERPL-MCNC: 139 MG/DL — SIGNIFICANT CHANGE UP
TROPONIN I, HIGH SENSITIVITY RESULT: 10.1 NG/L — SIGNIFICANT CHANGE UP
UROBILINOGEN FLD QL: 0.2 MG/DL — SIGNIFICANT CHANGE UP (ref 0.2–1)
WBC # BLD: 14.96 K/UL — HIGH (ref 3.8–10.5)
WBC # BLD: 8.81 K/UL — SIGNIFICANT CHANGE UP (ref 3.8–10.5)
WBC # FLD AUTO: 14.96 K/UL — HIGH (ref 3.8–10.5)
WBC # FLD AUTO: 8.81 K/UL — SIGNIFICANT CHANGE UP (ref 3.8–10.5)
WBC UR QL: 4 /HPF — SIGNIFICANT CHANGE UP (ref 0–5)

## 2024-06-05 PROCEDURE — 99291 CRITICAL CARE FIRST HOUR: CPT

## 2024-06-05 PROCEDURE — 99222 1ST HOSP IP/OBS MODERATE 55: CPT

## 2024-06-05 PROCEDURE — 70450 CT HEAD/BRAIN W/O DYE: CPT | Mod: 26,59,MC

## 2024-06-05 PROCEDURE — 0042T: CPT | Mod: MC

## 2024-06-05 PROCEDURE — 70496 CT ANGIOGRAPHY HEAD: CPT | Mod: 26,MC

## 2024-06-05 PROCEDURE — 99292 CRITICAL CARE ADDL 30 MIN: CPT

## 2024-06-05 PROCEDURE — 93970 EXTREMITY STUDY: CPT | Mod: 26

## 2024-06-05 PROCEDURE — 70450 CT HEAD/BRAIN W/O DYE: CPT | Mod: 26,59,77

## 2024-06-05 PROCEDURE — 93010 ELECTROCARDIOGRAM REPORT: CPT

## 2024-06-05 PROCEDURE — 70498 CT ANGIOGRAPHY NECK: CPT | Mod: 26,MC

## 2024-06-05 PROCEDURE — 93306 TTE W/DOPPLER COMPLETE: CPT | Mod: 26

## 2024-06-05 RX ORDER — GABAPENTIN 400 MG/1
300 CAPSULE ORAL DAILY
Refills: 0 | Status: DISCONTINUED | OUTPATIENT
Start: 2024-06-05 | End: 2024-06-13

## 2024-06-05 RX ORDER — ACETAMINOPHEN 500 MG
1000 TABLET ORAL ONCE
Refills: 0 | Status: COMPLETED | OUTPATIENT
Start: 2024-06-05 | End: 2024-06-05

## 2024-06-05 RX ORDER — BACLOFEN 100 %
10 POWDER (GRAM) MISCELLANEOUS DAILY
Refills: 0 | Status: DISCONTINUED | OUTPATIENT
Start: 2024-06-05 | End: 2024-06-07

## 2024-06-05 RX ORDER — SENNA PLUS 8.6 MG/1
2 TABLET ORAL AT BEDTIME
Refills: 0 | Status: DISCONTINUED | OUTPATIENT
Start: 2024-06-05 | End: 2024-06-13

## 2024-06-05 RX ORDER — SODIUM CHLORIDE 5 G/100ML
500 INJECTION, SOLUTION INTRAVENOUS
Refills: 0 | Status: DISCONTINUED | OUTPATIENT
Start: 2024-06-05 | End: 2024-06-07

## 2024-06-05 RX ORDER — POLYETHYLENE GLYCOL 3350 17 G/17G
17 POWDER, FOR SOLUTION ORAL DAILY
Refills: 0 | Status: DISCONTINUED | OUTPATIENT
Start: 2024-06-05 | End: 2024-06-13

## 2024-06-05 RX ORDER — ONDANSETRON 8 MG/1
4 TABLET, FILM COATED ORAL EVERY 6 HOURS
Refills: 0 | Status: DISCONTINUED | OUTPATIENT
Start: 2024-06-05 | End: 2024-06-13

## 2024-06-05 RX ORDER — SODIUM CHLORIDE 5 G/100ML
1000 INJECTION, SOLUTION INTRAVENOUS
Refills: 0 | Status: DISCONTINUED | OUTPATIENT
Start: 2024-06-05 | End: 2024-06-05

## 2024-06-05 RX ORDER — ATORVASTATIN CALCIUM 80 MG/1
10 TABLET, FILM COATED ORAL AT BEDTIME
Refills: 0 | Status: DISCONTINUED | OUTPATIENT
Start: 2024-06-05 | End: 2024-06-13

## 2024-06-05 RX ORDER — SODIUM CHLORIDE 5 G/100ML
500 INJECTION, SOLUTION INTRAVENOUS
Refills: 0 | Status: DISCONTINUED | OUTPATIENT
Start: 2024-06-05 | End: 2024-06-05

## 2024-06-05 RX ORDER — PANTOPRAZOLE SODIUM 20 MG/1
40 TABLET, DELAYED RELEASE ORAL DAILY
Refills: 0 | Status: DISCONTINUED | OUTPATIENT
Start: 2024-06-05 | End: 2024-06-05

## 2024-06-05 RX ORDER — LEVETIRACETAM 250 MG/1
1000 TABLET, FILM COATED ORAL ONCE
Refills: 0 | Status: COMPLETED | OUTPATIENT
Start: 2024-06-05 | End: 2024-06-05

## 2024-06-05 RX ORDER — ATORVASTATIN CALCIUM 80 MG/1
1 TABLET, FILM COATED ORAL
Refills: 0 | DISCHARGE

## 2024-06-05 RX ORDER — LOSARTAN POTASSIUM 100 MG/1
50 TABLET, FILM COATED ORAL DAILY
Refills: 0 | Status: DISCONTINUED | OUTPATIENT
Start: 2024-06-05 | End: 2024-06-06

## 2024-06-05 RX ORDER — NICARDIPINE HYDROCHLORIDE 30 MG/1
5 CAPSULE, EXTENDED RELEASE ORAL
Qty: 40 | Refills: 0 | Status: DISCONTINUED | OUTPATIENT
Start: 2024-06-05 | End: 2024-06-07

## 2024-06-05 RX ORDER — HYDRALAZINE HCL 50 MG
10 TABLET ORAL ONCE
Refills: 0 | Status: DISCONTINUED | OUTPATIENT
Start: 2024-06-05 | End: 2024-06-05

## 2024-06-05 RX ORDER — GABAPENTIN 400 MG/1
1 CAPSULE ORAL
Refills: 0 | DISCHARGE

## 2024-06-05 RX ORDER — ACETAMINOPHEN 500 MG
1000 TABLET ORAL EVERY 6 HOURS
Refills: 0 | Status: DISCONTINUED | OUTPATIENT
Start: 2024-06-05 | End: 2024-06-07

## 2024-06-05 RX ORDER — CHLORHEXIDINE GLUCONATE 213 G/1000ML
1 SOLUTION TOPICAL DAILY
Refills: 0 | Status: DISCONTINUED | OUTPATIENT
Start: 2024-06-05 | End: 2024-06-11

## 2024-06-05 RX ORDER — OXYCODONE HYDROCHLORIDE 5 MG/1
5 TABLET ORAL EVERY 4 HOURS
Refills: 0 | Status: DISCONTINUED | OUTPATIENT
Start: 2024-06-05 | End: 2024-06-05

## 2024-06-05 RX ORDER — OMEPRAZOLE 10 MG/1
1 CAPSULE, DELAYED RELEASE ORAL
Refills: 0 | DISCHARGE

## 2024-06-05 RX ORDER — NICARDIPINE HYDROCHLORIDE 30 MG/1
5 CAPSULE, EXTENDED RELEASE ORAL
Qty: 40 | Refills: 0 | Status: DISCONTINUED | OUTPATIENT
Start: 2024-06-05 | End: 2024-06-05

## 2024-06-05 RX ORDER — PANTOPRAZOLE SODIUM 20 MG/1
40 TABLET, DELAYED RELEASE ORAL
Refills: 0 | Status: DISCONTINUED | OUTPATIENT
Start: 2024-06-05 | End: 2024-06-08

## 2024-06-05 RX ORDER — DESMOPRESSIN ACETATE 0.1 MG/1
35 TABLET ORAL ONCE
Refills: 0 | Status: DISCONTINUED | OUTPATIENT
Start: 2024-06-05 | End: 2024-06-05

## 2024-06-05 RX ORDER — ONDANSETRON 8 MG/1
4 TABLET, FILM COATED ORAL ONCE
Refills: 0 | Status: COMPLETED | OUTPATIENT
Start: 2024-06-05 | End: 2024-06-05

## 2024-06-05 RX ORDER — INSULIN LISPRO 100/ML
VIAL (ML) SUBCUTANEOUS EVERY 6 HOURS
Refills: 0 | Status: DISCONTINUED | OUTPATIENT
Start: 2024-06-05 | End: 2024-06-07

## 2024-06-05 RX ORDER — DESMOPRESSIN ACETATE 0.1 MG/1
35 TABLET ORAL ONCE
Refills: 0 | Status: COMPLETED | OUTPATIENT
Start: 2024-06-05 | End: 2024-06-05

## 2024-06-05 RX ADMIN — Medication 1000 MILLIGRAM(S): at 20:37

## 2024-06-05 RX ADMIN — ONDANSETRON 4 MILLIGRAM(S): 8 TABLET, FILM COATED ORAL at 07:47

## 2024-06-05 RX ADMIN — Medication 1000 MILLIGRAM(S): at 12:46

## 2024-06-05 RX ADMIN — LEVETIRACETAM 400 MILLIGRAM(S): 250 TABLET, FILM COATED ORAL at 05:51

## 2024-06-05 RX ADMIN — Medication 10 MILLIGRAM(S): at 21:58

## 2024-06-05 RX ADMIN — CHLORHEXIDINE GLUCONATE 1 APPLICATION(S): 213 SOLUTION TOPICAL at 12:14

## 2024-06-05 RX ADMIN — SODIUM CHLORIDE 50 MILLILITER(S): 5 INJECTION, SOLUTION INTRAVENOUS at 19:42

## 2024-06-05 RX ADMIN — Medication 400 MILLIGRAM(S): at 12:16

## 2024-06-05 RX ADMIN — GABAPENTIN 300 MILLIGRAM(S): 400 CAPSULE ORAL at 12:13

## 2024-06-05 RX ADMIN — NICARDIPINE HYDROCHLORIDE 25 MG/HR: 30 CAPSULE, EXTENDED RELEASE ORAL at 19:09

## 2024-06-05 RX ADMIN — DESMOPRESSIN ACETATE 235 MICROGRAM(S): 0.1 TABLET ORAL at 08:28

## 2024-06-05 RX ADMIN — PANTOPRAZOLE SODIUM 40 MILLIGRAM(S): 20 TABLET, DELAYED RELEASE ORAL at 12:15

## 2024-06-05 RX ADMIN — Medication 2: at 12:28

## 2024-06-05 RX ADMIN — ONDANSETRON 4 MILLIGRAM(S): 8 TABLET, FILM COATED ORAL at 17:08

## 2024-06-05 RX ADMIN — ATORVASTATIN CALCIUM 10 MILLIGRAM(S): 80 TABLET, FILM COATED ORAL at 21:58

## 2024-06-05 RX ADMIN — NICARDIPINE HYDROCHLORIDE 25 MG/HR: 30 CAPSULE, EXTENDED RELEASE ORAL at 07:46

## 2024-06-05 RX ADMIN — SODIUM CHLORIDE 50 MILLILITER(S): 5 INJECTION, SOLUTION INTRAVENOUS at 09:06

## 2024-06-05 RX ADMIN — NICARDIPINE HYDROCHLORIDE 25 MG/HR: 30 CAPSULE, EXTENDED RELEASE ORAL at 06:02

## 2024-06-05 RX ADMIN — Medication 400 MILLIGRAM(S): at 20:22

## 2024-06-05 NOTE — PHYSICAL THERAPY INITIAL EVALUATION ADULT - PERTINENT HX OF CURRENT PROBLEM, REHAB EVAL
Pt is 83F admitted 6/5/24 PMHx  on ASA81, Gujarrati speaking, h/o DM, HTN, HLD, chronic LBP, p/f inability to move Rt side at 430AM today. 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 CTA negative. SBP in the 180s. Plts/coags wnl. Exam: EOV, awake, Ox3, brisk FC, Lt side 5/5, RUE 4-/5, RLE 3/5. Pt is 83F admitted 6/5/24 PMHx  on ASA81, h/o DM, HTN, HLD, chronic LBP, p/f inability to move Rt side at 430AM today. Code stroke called, xfer from OSH.   -CTH w/ L thalamic+ posterior capsular/corona radiata hemorrhage w/ IVH, nearly casted 3rd vent w/ hydro i/s/o underlying ex vacuo. CTA negative. SBP in the 180s. Plts/coags wnl. Exam: EOV, awake, Ox3, brisk FC, Lt side 5/5, RUE 4-/5, RLE 3/5.

## 2024-06-05 NOTE — OCCUPATIONAL THERAPY INITIAL EVALUATION ADULT - LEVEL OF INDEPENDENCE: SIT/STAND, REHAB EVAL
unable to achieve full stand-resistive to not utilize rolling walker/maximum assist (25% patients effort)

## 2024-06-05 NOTE — OCCUPATIONAL THERAPY INITIAL EVALUATION ADULT - LIVES WITH, PROFILE
Lives in colonial style house with son, 5 steps to enter +handrail, full flight of steps to bed/bath

## 2024-06-05 NOTE — ED ADULT NURSE NOTE - NSFALLHARMRISKINTERV_ED_ALL_ED

## 2024-06-05 NOTE — OCCUPATIONAL THERAPY INITIAL EVALUATION ADULT - PERTINENT HX OF CURRENT PROBLEM, REHAB EVAL
83F on ASA81, Gujarrati speaking, h/o DM, HTN, HLD, chronic LBP, p/f inability to move Rt side at 430AM today. 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 CTA negative. SBP in the 180s. Plts/coags wnl. Exam: EOV, awake, Ox3, brisk FC, Lt side 5/5, RUE 4-/5, RLE 3/5

## 2024-06-05 NOTE — ED ADULT TRIAGE NOTE - CHIEF COMPLAINT QUOTE
As per EMS family reports pt woke up and went to the bathroom. States at 420AM pt noted with right sided weakness and facial droop. EMS reports on arrival to scene pt found sitting on toilet and flaccid on right side, pulse ox 81%RA.

## 2024-06-05 NOTE — ED PROVIDER NOTE - PHYSICAL EXAMINATION
Physical Exam:  Eyes: EOMI, Conjunctiva and sclera clear  Neck: No JVD  Lungs: Clear to auscultation bilaterally, no wheeze, no rhonchi  Heart: Normal S1, S2, no murmurs  Abdomen: Soft, nontender, nondistended  Extremities: 2+ peripheral pulses, no edema  Neurologic: Lt side 5/5, RUE 4-/5, RLE 3/5

## 2024-06-05 NOTE — PROGRESS NOTE ADULT - CRITICAL CARE ATTENDING COMMENT
left BG ICH with IVH   exam opens eyes to verbal, Alejandro , Intact EOM, mild right facial , right UE drift, right extension and flexion 4+, grasp 5/5, right LE drift, left side 5/5   CTA no vascular malformation   repeat CT head now   neuro checks q 1 hr   2% at 50 ml/hr , BMP q 6 hr , sodium goal 140-150   nicardipine SBP goal 110-160 mmhg , need an a line, TTE pending   NC, protecting airway   NPO for now , home PPI   DM, insuling scale, finger tsicks q 6 hr   no chemorpophylaxis for now fiven PBD 0 , SCD, LE dopplers     DNR   GOC discussion with family

## 2024-06-05 NOTE — PROGRESS NOTE ADULT - ASSESSMENT
ASSESSMENT/PLAN: Right thalamic ICH with IVH    NEURO:  NeuroQ1, Vitals Q1  4 hour interval r CTH  Hydrocephalus watch plan for EVD as per neurosgx  Activity: [] OOB as tolerated [] Bedrest [] PT [] OT [] PMNR    PULM:  2L NC  sat > 92%    CV:  SBP goal: 110-160  TTE    RENAL:  Fluids: 2 % at 50 ccs/hour    GI:  Diet:  GI prophylaxis [] not indicated [] PPI [] other:  Bowel regimen [] colace [] senna [] other:    ENDO:   Goal euglycemia (-180)  ISS    HEME/ONC:  VTE prophylaxis: [] SCDs [] chemoprophylaxis [] high risk of DVT/PE on admission due to:  s/p DDAVP  LEDs    ID:    MISC:    SOCIAL/FAMILY:  [] updated at bedside [] family meeting    CODE STATUS:  [] Full Code [x] DNR [] DNI [] Palliative/Comfort Care    DISPOSITION:  [] ICU [] Stroke Unit [] Floor [] EMU [] RCU [] PCU    [] Patient is at high risk of neurologic deterioration/death due to:     Time seen:  Time spent: ___ [] critical care minutes ASSESSMENT/PLAN: Right thalamic ICH with IVH    NEURO:  NeuroQ1, Vitals Q1  4 hour interval r CTH stable   Hydrocephalus watch plan for EVD as per neurosgx  Activity: [x] OOB as tolerated [] Bedrest [] PT [] OT [] PMNR    PULM:  2L NC  sat > 92%    CV:  SBP goal: 110-160  TTE - p     RENAL:  Fluids: 2 % at 50 ccs/hour    GI:  Diet:  GI prophylaxis [] not indicated [] PPI [] other:  Bowel regimen [] colace [x] senna [] other:    ENDO:   Goal euglycemia (-180)  ISS    HEME/ONC:  VTE prophylaxis: [x] SCDs [] chemoprophylaxis [] high risk of DVT/PE on admission due to:  s/p DDAVP  LED 6/5 neg     ID:  afebrile     MISC:    SOCIAL/FAMILY:  [x] updated at bedside [] family meeting    CODE STATUS:  [] Full Code [x] DNR [] DNI [] Palliative/Comfort Care    DISPOSITION:  [x] ICU [] Stroke Unit [] Floor [] EMU [] RCU [] PCU    [] Patient is at high risk of neurologic deterioration/death due to: ICH    Time seen:  Time spent: 35 critical care minutes ASSESSMENT/PLAN: Right thalamic ICH with IVH    NEURO:  NeuroQ1, Vitals Q1  4 hour interval r CTH stable   Gabapentin for pain  baclofen for spasticity   Hydrocephalus watch plan for EVD as per neurosgx  Activity: [x] OOB as tolerated [] Bedrest [] PT [] OT [] PMNR    PULM:  5L NC  sat > 92%    CV:  SBP goal: 110-160  TTE - p   lipitor 10   cardene gtt   restarting losartan 50 qd for HTN home med       RENAL:  Fluids: 3 % at 50 ccs/hour  BMP q 6 140-150 Na goal     GI:  Diet: NPO  GI prophylaxis [] not indicated [x] PPI [] other:  Bowel regimen [] colace [x] senna [] other: miralax     ENDO:   Goal euglycemia (-180)  ISS    HEME/ONC:  VTE prophylaxis: [x] SCDs [] chemoprophylaxis [] high risk of DVT/PE on admission due to:  s/p DDAVP  LED 6/5 neg     ID:  afebrile     MISC:    SOCIAL/FAMILY:  [x] updated at bedside [] family meeting    CODE STATUS:  [] Full Code [x] DNR [] DNI [] Palliative/Comfort Care    DISPOSITION:  [x] ICU [] Stroke Unit [] Floor [] EMU [] RCU [] PCU    [x] Patient is at high risk of neurologic deterioration/death due to: ICH    Time seen:  Time spent: 35 critical care minutes

## 2024-06-05 NOTE — ED ADULT NURSE REASSESSMENT NOTE - NS ED NURSE REASSESS COMMENT FT1
Pt a&ox3, breathing spontaneous and unlabored, following commands and moving all extremities, skin warm and dry.  Pt mentating well, drowsy but alert and responsive to verbal stimuli.  Pt endorsing relief from nausea s/p zofran.  Pt denies pain at this time.  IV WDL.  Pt family bedside and providing interpretation at pts's request.    Harden cath placed as ordered with 2nd RN at bedside to ensure sterility, secured to leg, and approx 200cc of yellow urine drained into collection bag, pt tolerated well

## 2024-06-05 NOTE — ED ADULT NURSE NOTE - NSFALLRISKINTERV_ED_ALL_ED

## 2024-06-05 NOTE — ED ADULT NURSE NOTE - ED STAT RN HANDOFF DETAILS
report given to HENOK Baum. pt resting in stretcher on cardiac monitor on 2L NC. Right sided weakness noted, NIH completed - score of 8. SOFIA att, Keppra completed, nicardipine running at per MD order, 5mg/hr endorsed to Sarika. safety measure maintained. Son at bedside and aware of transfer. transfer to [facility] for L brain bleed. report given to HENOK Baum. pt resting in stretcher on cardiac monitor on 2L NC. Right sided weakness noted, NIH completed - score of 7. SOFIA att, Keppra completed, nicardipine running at per MD order, 5mg/hr endorsed to Sarika. safety measure maintained. Son at bedside and aware of transfer. transfer to [facility] for L brain bleed.

## 2024-06-05 NOTE — ED ADULT NURSE NOTE - OBJECTIVE STATEMENT
84 y/o F PMHx DM, HTN BIB EMS transfer from [place] presents to the ED w/ numbness/weakness since 2.5 hours prior to ED arrival. Per EMS, pt at 4:20 got up to use the bathroom. Pt then began to experience right sided weakness and unable to get up from the toilet. Pt was sent to [facility] ED and had CT done. 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 Pt was transferred to ED w/ nicardipine drip. Pt unable to provide history due to acuity of illness. Pt dressed ing own 84 y/o F PMHx DM, HTN BIB EMS transfer from [place] presents to the ED w/ numbness/weakness since 2.5 hours prior to ED arrival. Per EMS, pt at 4:20 got up to use the bathroom. Pt then began to experience right sided weakness and unable to get up from the toilet. Pt was sent to [place] ED and had CT done. 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 Pt was transferred to ED w/ nicardipine drip. Pt unable to provide history due to acuity of illness. Pt breathing spontaneous, unlabored, and equal bilaterally.

## 2024-06-05 NOTE — ED PROVIDER NOTE - CLINICAL SUMMARY MEDICAL DECISION MAKING FREE TEXT BOX
84 yo F with R sided weakness, onset 50 minutes ago  -cbc, cmp, ammonia, coags, lactate, lipid profile, trop, CTA head/neck, CT perfusion, CXR, EKG, npo, monitor, finger stick  -f/u results, telestroke consult, reeval

## 2024-06-05 NOTE — PHYSICAL THERAPY INITIAL EVALUATION ADULT - ADDITIONAL COMMENTS
Patient lives in a private house with her son with 5 steps to enter and 1 flight of stairs inside to bedroom. She ambulates with a rolling walker and was independent with ADLs.

## 2024-06-05 NOTE — CHART NOTE - NSCHARTNOTEFT_GEN_A_CORE
CAPRINI SCORE [CLOT] Score on Admission for     AGE RELATED RISK FACTORS                                                       MOBILITY RELATED FACTORS  [ ] Age 41-60 years                                            (1 Point)                  [ ] Bed rest                                                        (1 Point)  [ ] Age: 61-74 years                                           (2 Points)                 [ ] Plaster cast                                                   (2 Points)  [ ] Age= 75 years                                              (3 Points)                 [ ] Bed bound for more than 72 hours                 (2 Points)    DISEASE RELATED RISK FACTORS                                               GENDER SPECIFIC FACTORS  [ ] Edema in the lower extremities                       (1 Point)                  [ ] Pregnancy                                                     (1 Point)  [ ] Varicose veins                                               (1 Point)                  [ ] Post-partum < 6 weeks                                   (1 Point)             [ ] BMI > 25 Kg/m2                                            (1 Point)                  [ ] Hormonal therapy  or oral contraception          (1 Point)                 [ ] Sepsis (in the previous month)                        (1 Point)                  [ ] History of pregnancy complications                 (1 point)  [ ] Pneumonia or serious lung disease                                               [ ] Unexplained or recurrent                     (1 Point)           (in the previous month)                               (1 Point)  [ ] Abnormal pulmonary function test                     (1 Point)                 SURGERY RELATED RISK FACTORS (include planned surgeries)  [ ] Acute myocardial infarction                              (1 Point)                 [ ]  Section                                             (1 Point)  [ ] Congestive heart failure (in the previous month)  (1 Point)         [ ] Minor surgery                                                  (1 Point)   [ ] Inflammatory bowel disease                             (1 Point)                 [ ] Arthroscopic surgery                                        (2 Points)  [ ] Central venous access                                      (2 Points)                [ ] General surgery lasting more than 45 minutes   (2 Points)       [x ] Stroke (in the previous month)                          (5 Points)               [ ] Elective arthroplasty                                         (5 Points)            [ ] current or past malignancy                              (2 Points)                                                                                                       HEMATOLOGY RELATED FACTORS                                                 TRAUMA RELATED RISK FACTORS  [ ] Prior episodes of VTE                                     (3 Points)                [ ] Fracture of the hip, pelvis, or leg                       (5 Points)  [ ] Positive family history for VTE                         (3 Points)                 [ ] Acute spinal cord injury (in the previous month)  (5 Points)  [ ] Prothrombin 25597 A                                     (3 Points)                 [ ] Paralysis  (less than 1 month)                             (5 Points)  [ ] Factor V Leiden                                             (3 Points)                  [ ] Multiple Trauma within 1 month                        (5 Points)  [ ] Lupus anticoagulants                                     (3 Points)                                                           [ ] Anticardiolipin antibodies                               (3 Points)                                                       [ ] High homocysteine in the blood                      (3 Points)                                             [ ] Other congenital or acquired thrombophilia      (3 Points)                                                [ ] Heparin induced thrombocytopenia                  (3 Points)                                          Total Score [    5      ]    Risk:  Very low 0   Low 1 to 2   Moderate 3 to 4   High =5       VTE Prophylasix Recommednations:  [x ] mechanical pneumatic compression devices                                      [ ] contraindicated: _____________________  [ ] chemo prophylasix                                                                                   [ ] contraindicated _____________________    **** HIGH LIKELIHOOD DVT PRESENT ON ADMISSION  [ ] (please order LE dopplers within 24 hours of admission) CAPRINI SCORE [CLOT] Score on Admission for     AGE RELATED RISK FACTORS                                                       MOBILITY RELATED FACTORS  [ ] Age 41-60 years                                            (1 Point)                  [ ] Bed rest                                                        (1 Point)  [ ] Age: 61-74 years                                           (2 Points)                 [ ] Plaster cast                                                   (2 Points)  [ x] Age= 75 years                                              (3 Points)                 [ ] Bed bound for more than 72 hours                 (2 Points)    DISEASE RELATED RISK FACTORS                                               GENDER SPECIFIC FACTORS  [ ] Edema in the lower extremities                       (1 Point)                  [ ] Pregnancy                                                     (1 Point)  [ ] Varicose veins                                               (1 Point)                  [ ] Post-partum < 6 weeks                                   (1 Point)             [ ] BMI > 25 Kg/m2                                            (1 Point)                  [ ] Hormonal therapy  or oral contraception          (1 Point)                 [ ] Sepsis (in the previous month)                        (1 Point)                  [ ] History of pregnancy complications                 (1 point)  [ ] Pneumonia or serious lung disease                                               [ ] Unexplained or recurrent                     (1 Point)           (in the previous month)                               (1 Point)  [ ] Abnormal pulmonary function test                     (1 Point)                 SURGERY RELATED RISK FACTORS (include planned surgeries)  [ ] Acute myocardial infarction                              (1 Point)                 [ ]  Section                                             (1 Point)  [ ] Congestive heart failure (in the previous month)  (1 Point)         [ ] Minor surgery                                                  (1 Point)   [ ] Inflammatory bowel disease                             (1 Point)                 [ ] Arthroscopic surgery                                        (2 Points)  [ ] Central venous access                                      (2 Points)                [ ] General surgery lasting more than 45 minutes   (2 Points)       [x ] Stroke (in the previous month)                          (5 Points)               [ ] Elective arthroplasty                                         (5 Points)            [ ] current or past malignancy                              (2 Points)                                                                                                       HEMATOLOGY RELATED FACTORS                                                 TRAUMA RELATED RISK FACTORS  [ ] Prior episodes of VTE                                     (3 Points)                [ ] Fracture of the hip, pelvis, or leg                       (5 Points)  [ ] Positive family history for VTE                         (3 Points)                 [ ] Acute spinal cord injury (in the previous month)  (5 Points)  [ ] Prothrombin 46200 A                                     (3 Points)                 [ ] Paralysis  (less than 1 month)                             (5 Points)  [ ] Factor V Leiden                                             (3 Points)                  [ ] Multiple Trauma within 1 month                        (5 Points)  [ ] Lupus anticoagulants                                     (3 Points)                                                           [ ] Anticardiolipin antibodies                               (3 Points)                                                       [ ] High homocysteine in the blood                      (3 Points)                                             [ ] Other congenital or acquired thrombophilia      (3 Points)                                                [ ] Heparin induced thrombocytopenia                  (3 Points)                                          Total Score [    8      ]    Risk:  Very low 0   Low 1 to 2   Moderate 3 to 4   High =5       VTE Prophylasix Recommednations:  [x ] mechanical pneumatic compression devices                                      [ ] contraindicated: _____________________  [ ] chemo prophylasix                                                                                   [ ] contraindicated _____________________    **** HIGH LIKELIHOOD DVT PRESENT ON ADMISSION  [ ] (please order LE dopplers within 24 hours of admission)

## 2024-06-05 NOTE — H&P ADULT - ATTENDING COMMENTS
83F on ASA81, Guj[place] speaking, h/o DM, HTN, HLD, chronic LBP, p/f inability to move Rt side at 430AM today. 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 CTA negative. SBP in the 180s. Plts/coags wnl. Exam: EOV, awake, Ox3, brisk FC, Lt side 5/5, RUE 4-/5, RLE 3/5  -Adm NSCU, q1h neuro checks and EVD/hydro watch   -Cardene gtt for -160   -DDAVP  -ARU   -4h rpt CTH for stability   -Na goals 145-155. Current Na 139    Risks and benefits of external ventricular drain were discussed

## 2024-06-05 NOTE — PATIENT PROFILE ADULT - FALL HARM RISK - HARM RISK INTERVENTIONS
Assistance with ambulation/Assistance OOB with selected safe patient handling equipment/Communicate Risk of Fall with Harm to all staff/Discuss with provider need for PT consult/Monitor gait and stability/Provide patient with walking aids - walker, cane, crutches/Reinforce activity limits and safety measures with patient and family/Sit up slowly, dangle for a short time, stand at bedside before walking/Tailored Fall Risk Interventions/Visual Cue: Yellow wristband and red socks/Bed in lowest position, wheels locked, appropriate side rails in place/Call bell, personal items and telephone in reach/Instruct patient to call for assistance before getting out of bed or chair/Non-slip footwear when patient is out of bed/[place] to call system/Physically safe environment - no spills, clutter or unnecessary equipment/Purposeful Proactive Rounding/Room/bathroom lighting operational, light cord in reach

## 2024-06-05 NOTE — H&P ADULT - TIME BILLING
Discussion of risks and benefits of external ventricular drain  Imaging review, plan formulation, coordination of care

## 2024-06-05 NOTE — ED PROVIDER NOTE - ATTENDING CONTRIBUTION TO CARE
I have personally provided the amount of critical care time documented below, excluding time spent on separate procedures: bleed on Cardene     I have personally performed a face to face medical and diagnostic evaluation of the patient. I have discussed with and reviewed the Fellow's note and agree with the History, ROS, Physical Exam and MDM unless otherwise indicated. A brief summary of my personal evaluation and impression can be found below.    83-year-old female presenting from outside hospital for spontaneous thalamic bleed.  Patient's last known normal was 4:30 AM when she woke up to go to bed.  When she woke up again started experiencing sudden onset right-sided weakness.  Code stroke called at outside hospital with findings of thalamic bleed.  Patient is on aspirin, not on any other anticoagulation.  No history of trauma.  Persistent hypertension at outside ED, started on Cardene drip and sent for neurosurgical evaluation.  On exam, vital signs stable, right upper and lower extremity weakness when compared to left.  Neurosurgery at bedside.  Plan to titrate Cardene drip for systolic blood pressure less than 160.  Will give DDAVP.  Plan for admission to neurosurgical ICU. Caron Gamble, ED Attending

## 2024-06-05 NOTE — OCCUPATIONAL THERAPY INITIAL EVALUATION ADULT - RANGE OF MOTION EXAMINATION, LOWER EXTREMITY
English Left LE Active ROM was WFL (within functional limits)/Right LE Active Assistive ROM was WFL  (within functional limits)

## 2024-06-05 NOTE — PROGRESS NOTE ADULT - SUBJECTIVE AND OBJECTIVE BOX
HOSPITAL COURSE: 83F on ASA81, Gujarrati speaking, h/o DM, HTN, HLD, chronic LBP, p/f inability to move Rt side at 430AM today. 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 CTA negative.        Admission Scores  GCS:   HH:   MF:   NIHSS:   RASS:    CAM-ICU:   ICH: 3    24 hour Events:       Allergies    penicillin (Unknown)    Intolerances        REVIEW OF SYSTEMS: [ ] Unable to Assess due to neurologic exam   [ ] All ROS addressed below are non-contributory, except:  Neuro: [ ] Headache [ ] Back pain [ ] Numbness [ ] Weakness [ ] Ataxia [ ] Dizziness [ ] Aphasia [ ] Dysarthria [ ] Visual disturbance  Resp: [ ] Shortness of breath/dyspnea, [ ] Orthopnea [ ] Cough  CV: [ ] Chest pain [ ] Palpitation [ ] Lightheadedness [ ] Syncope  Renal: [ ] Thirst [ ] Edema  GI: [ ] Nausea [ ] Emesis [ ] Abdominal pain [ ] Constipation [ ] Diarrhea  Hem: [ ] Hematemesis [ ] bright red blood per rectum  ID: [ ] Fever [ ] Chills [ ] Dysuria  ENT: [ ] Rhinorrhea      DEVICES:   [ ] Restraints [ ] ET tube [ ] central line [ ] arterial line [ ] gomez [ ] NGT/OGT [ ] EVD [ ] LD [ ] SANDRO/HMV [ ] Trach [ ] PEG [ ] Chest Tube     ICU Vital Signs Last 24 Hrs  T(C): 36.5 (05 Jun 2024 09:25), Max: 36.5 (05 Jun 2024 06:09)  T(F): 97.7 (05 Jun 2024 09:25), Max: 97.7 (05 Jun 2024 06:09)  HR: 88 (05 Jun 2024 09:45) (65 - 98)  BP: 138/59 (05 Jun 2024 09:30) (133/76 - 210/94)  BP(mean): 85 (05 Jun 2024 09:30) (85 - 116)  ABP: --  ABP(mean): --  RR: 22 (05 Jun 2024 09:45) (15 - 26)  SpO2: 93% (05 Jun 2024 09:45) (91% - 100%)    O2 Parameters below as of 05 Jun 2024 09:25  Patient On (Oxygen Delivery Method): nasal cannula  O2 Flow (L/min): 4    I&O's Summary    05 Jun 2024 07:01  -  05 Jun 2024 10:00  --------------------------------------------------------  IN: 125 mL / OUT: 325 mL / NET: -200 mL        LABS:                        12.1   14.96 )-----------( 297      ( 05 Jun 2024 07:31 )             38.6                MEDICATION LEVELS:     IVF FLUIDS/MEDICATIONS:   MEDICATIONS  (STANDING):  desmopressin IVPB 35 MICROGram(s) IV Intermittent Once  niCARdipine Infusion 5 mG/Hr (25 mL/Hr) IV Continuous <Continuous>  sodium chloride 2% + sodium acetate 50:50 1000 milliLiter(s) (50 mL/Hr) IV Continuous <Continuous>    MEDICATIONS  (PRN):        IMAGING:      EXAMINATION:  PHYSICAL EXAM:    Constitutional: No Acute Distress     Neurological: EOV, awake, Ox3, brisk FC, Lt side 5/5, RUE 4-/5, RLE 3/5     Pulmonary: Clear to Auscultation, No rales, No rhonchi, No wheezes     Cardiovascular: S1, S2, Regular rate and rhythm     Gastrointestinal: Soft, Non-tender, Non-distended     Extremities: No calf tenderness     Incision:    HOSPITAL COURSE: 83F on ASA81, Gujarrati speaking, h/o DM, HTN, HLD, chronic LBP, p/f inability to move Rt side at 430AM today. 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 CTA negative.      24 hour Events:       Allergies    penicillin (Unknown)    Intolerances        REVIEW OF SYSTEMS: [ ] Unable to Assess due to neurologic exam   [ ] All ROS addressed below are non-contributory, except:  Neuro: [ ] Headache [ ] Back pain [ ] Numbness [ ] Weakness [ ] Ataxia [ ] Dizziness [ ] Aphasia [ ] Dysarthria [ ] Visual disturbance  Resp: [ ] Shortness of breath/dyspnea, [ ] Orthopnea [ ] Cough  CV: [ ] Chest pain [ ] Palpitation [ ] Lightheadedness [ ] Syncope  Renal: [ ] Thirst [ ] Edema  GI: [ ] Nausea [ ] Emesis [ ] Abdominal pain [ ] Constipation [ ] Diarrhea  Hem: [ ] Hematemesis [ ] bright red blood per rectum  ID: [ ] Fever [ ] Chills [ ] Dysuria  ENT: [ ] Rhinorrhea      DEVICES:   [ ] Restraints [ ] ET tube [ ] central line [ ] arterial line [ ] gomez [ ] NGT/OGT [ ] EVD [ ] LD [ ] SANDRO/HMV [ ] Trach [ ] PEG [ ] Chest Tube     ICU Vital Signs Last 24 Hrs  T(C): 36.5 (05 Jun 2024 09:25), Max: 36.5 (05 Jun 2024 06:09)  T(F): 97.7 (05 Jun 2024 09:25), Max: 97.7 (05 Jun 2024 06:09)  HR: 88 (05 Jun 2024 09:45) (65 - 98)  BP: 138/59 (05 Jun 2024 09:30) (133/76 - 210/94)  BP(mean): 85 (05 Jun 2024 09:30) (85 - 116)  ABP: --  ABP(mean): --  RR: 22 (05 Jun 2024 09:45) (15 - 26)  SpO2: 93% (05 Jun 2024 09:45) (91% - 100%)    O2 Parameters below as of 05 Jun 2024 09:25  Patient On (Oxygen Delivery Method): nasal cannula  O2 Flow (L/min): 4    I&O's Summary    05 Jun 2024 07:01  -  05 Jun 2024 10:00  --------------------------------------------------------  IN: 125 mL / OUT: 325 mL / NET: -200 mL        LABS:                        12.1   14.96 )-----------( 297      ( 05 Jun 2024 07:31 )             38.6                MEDICATION LEVELS:     IVF FLUIDS/MEDICATIONS:   MEDICATIONS  (STANDING):  desmopressin IVPB 35 MICROGram(s) IV Intermittent Once  niCARdipine Infusion 5 mG/Hr (25 mL/Hr) IV Continuous <Continuous>  sodium chloride 2% + sodium acetate 50:50 1000 milliLiter(s) (50 mL/Hr) IV Continuous <Continuous>    MEDICATIONS  (PRN):        IMAGING:      EXAMINATION:  PHYSICAL EXAM:    Constitutional: No Acute Distress     Neurological: EOV, awake, Ox3, brisk FC, Lt side 5/5, RUE 4-/5, RLE 3/5     Pulmonary: Clear to Auscultation, No rales, No rhonchi, No wheezes     Cardiovascular: S1, S2, Regular rate and rhythm     Gastrointestinal: Soft, Non-tender, Non-distended     Extremities: No calf tenderness     Incision:    HOSPITAL COURSE: 83F on ASA81, Gujarrati speaking, h/o DM, HTN, HLD, chronic LBP, p/f inability to move Rt side at 430AM today. 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 CTA negative.      24 hour Events:       Allergies    penicillin (Unknown)    Intolerances        REVIEW OF SYSTEMS: [ ] Unable to Assess due to neurologic exam   [ ] All ROS addressed below are non-contributory, except:  Neuro: [ ] Headache [ ] Back pain [ ] Numbness [ ] Weakness [ ] Ataxia [ ] Dizziness [ ] Aphasia [ ] Dysarthria [ ] Visual disturbance  Resp: [ ] Shortness of breath/dyspnea, [ ] Orthopnea [ ] Cough  CV: [ ] Chest pain [ ] Palpitation [ ] Lightheadedness [ ] Syncope  Renal: [ ] Thirst [ ] Edema  GI: [ ] Nausea [ ] Emesis [ ] Abdominal pain [ ] Constipation [ ] Diarrhea  Hem: [ ] Hematemesis [ ] bright red blood per rectum  ID: [ ] Fever [ ] Chills [ ] Dysuria  ENT: [ ] Rhinorrhea      DEVICES:   [ ] Restraints [ ] ET tube [ ] central line [ ] arterial line [ ] gomez [ ] NGT/OGT [ ] EVD [ ] LD [ ] SANDRO/HMV [ ] Trach [ ] PEG [ ] Chest Tube     ICU Vital Signs Last 24 Hrs  T(C): 36.5 (05 Jun 2024 09:25), Max: 36.5 (05 Jun 2024 06:09)  T(F): 97.7 (05 Jun 2024 09:25), Max: 97.7 (05 Jun 2024 06:09)  HR: 88 (05 Jun 2024 09:45) (65 - 98)  BP: 138/59 (05 Jun 2024 09:30) (133/76 - 210/94)  BP(mean): 85 (05 Jun 2024 09:30) (85 - 116)  ABP: --  ABP(mean): --  RR: 22 (05 Jun 2024 09:45) (15 - 26)  SpO2: 93% (05 Jun 2024 09:45) (91% - 100%)    O2 Parameters below as of 05 Jun 2024 09:25  Patient On (Oxygen Delivery Method): nasal cannula  O2 Flow (L/min): 4    I&O's Summary    05 Jun 2024 07:01  -  05 Jun 2024 10:00  --------------------------------------------------------  IN: 125 mL / OUT: 325 mL / NET: -200 mL        LABS:                        12.1   14.96 )-----------( 297      ( 05 Jun 2024 07:31 )             38.6                MEDICATION LEVELS:     IVF FLUIDS/MEDICATIONS:   MEDICATIONS  (STANDING):  desmopressin IVPB 35 MICROGram(s) IV Intermittent Once  niCARdipine Infusion 5 mG/Hr (25 mL/Hr) IV Continuous <Continuous>  sodium chloride 2% + sodium acetate 50:50 1000 milliLiter(s) (50 mL/Hr) IV Continuous <Continuous>    MEDICATIONS  (PRN):        IMAGING:      EXAMINATION:  PHYSICAL EXAM:    Constitutional: No Acute Distress     Neurological: Ox2-3, PERRL, right facial, dysarthric. FC, Left side 5/5, RUE 4/5 rue drift, RLE 4/5    Pulmonary: Clear to Auscultation, No rales, No rhonchi, No wheezes     Cardiovascular: S1, S2, Regular rate and rhythm     Gastrointestinal: Soft, Non-tender, Non-distended     Extremities: No calf tenderness      HOSPITAL COURSE: 83F on ASA81, Gujurati speaking, h/o DM, HTN, HLD, chronic LBP, p/f inability to move Rt side at 430AM today. 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 CTA negative.      24 hour Events:       Allergies    penicillin (Unknown)      REVIEW OF SYSTEMS: [] Unable to Assess due to neurologic exam   [x] All ROS addressed below are non-contributory, except:  Neuro: [ ] Headache [ ] Back pain [ ] Numbness [ ] Weakness [ ] Ataxia [ ] Dizziness [ ] Aphasia [ ] Dysarthria [ ] Visual disturbance  Resp: [ ] Shortness of breath/dyspnea, [ ] Orthopnea [ ] Cough  CV: [ ] Chest pain [ ] Palpitation [ ] Lightheadedness [ ] Syncope  Renal: [ ] Thirst [ ] Edema  GI: [ ] Nausea [ ] Emesis [ ] Abdominal pain [ ] Constipation [ ] Diarrhea  Hem: [ ] Hematemesis [ ] bright red blood per rectum  ID: [ ] Fever [ ] Chills [ ] Dysuria  ENT: [ ] Rhinorrhea      DEVICES:   [ ] Restraints [ ] ET tube [ ] central line [ ] arterial line [ ] gomez [ ] NGT/OGT [ ] EVD [ ] LD [ ] SANDRO/HMV [ ] Trach [ ] PEG [ ] Chest Tube     ICU Vital Signs Last 24 Hrs  T(C): 36.7 (05 Jun 2024 19:00), Max: 36.7 (05 Jun 2024 15:00)  T(F): 98 (05 Jun 2024 19:00), Max: 98.1 (05 Jun 2024 15:00)  HR: 78 (05 Jun 2024 23:00) (58 - 98)  BP: 138/73 (05 Jun 2024 23:00) (110/53 - 210/94)  BP(mean): 98 (05 Jun 2024 23:00) (62 - 116)  ABP: --  ABP(mean): --  RR: 20 (05 Jun 2024 23:00) (12 - 26)  SpO2: 95% (05 Jun 2024 23:00) (91% - 100%)    O2 Parameters below as of 05 Jun 2024 19:00  Patient On (Oxygen Delivery Method): room air      I&O's Summary    05 Jun 2024 07:01  -  06 Jun 2024 00:54  --------------------------------------------------------  IN: 925 mL / OUT: 1370 mL / NET: -445 mL      LABS:                        12.1   14.96 )-----------( 297      ( 05 Jun 2024 07:31 )             38.6   06-05    136  |  101  |  14  ----------------------------<  145<H>  4.5   |  21<L>  |  0.65    Ca    8.8      05 Jun 2024 15:52  Phos  3.6     06-05  Mg     1.6     06-05    TPro  7.5  /  Alb  4.2  /  TBili  0.8  /  DBili  x   /  AST  19  /  ALT  14  /  AlkPhos  63  06-05      MEDICATIONS  (STANDING):  atorvastatin 10 milliGRAM(s) Oral at bedtime  baclofen 10 milliGRAM(s) Oral daily  chlorhexidine 4% Liquid 1 Application(s) Topical daily  gabapentin 300 milliGRAM(s) Oral daily  insulin lispro (ADMELOG) corrective regimen sliding scale   SubCutaneous every 6 hours  losartan 50 milliGRAM(s) Oral daily  niCARdipine Infusion 5 mG/Hr (25 mL/Hr) IV Continuous <Continuous>  pantoprazole    Tablet 40 milliGRAM(s) Oral before breakfast  polyethylene glycol 3350 17 Gram(s) Oral daily  senna 2 Tablet(s) Oral at bedtime  sodium chloride 3%. 500 milliLiter(s) (50 mL/Hr) IV Continuous <Continuous>    MEDICATIONS  (PRN):  acetaminophen   IVPB .. 1000 milliGRAM(s) IV Intermittent every 6 hours PRN Mild Pain (1 - 3)  ondansetron Injectable 4 milliGRAM(s) IV Push every 6 hours PRN Nausea and/or Vomiting        IMAGING:  < from: CT Head No Cont (06.05.24 @ 12:04) >    IMPRESSION:    No interval change.    < end of copied text >  < from: CT Angio Neck Stroke Protocol w/ IV Cont (06.05.24 @ 05:40) >  IMPRESSION:    CTA Neck: No significant flow-limiting stenosis or evidence of acute   dissection within the cervical carotid or vertebral arteries.    CTA Head: No proximal large vessel occlusion, significant stenosis, or   evidence of intracranial aneurysm.    CT Perfusion: Nondiagnostic.    < end of copied text >      EXAMINATION:  PHYSICAL EXAM:    Constitutional: No Acute Distress     Neurological: Ox2-3, PERRL, right facial, dysarthric. FC, Left side 5/5, RUE 4/5 rue drift, RLE 4/5    Pulmonary: Clear to Auscultation, No rales, No rhonchi, No wheezes     Cardiovascular: S1, S2, Regular rate and rhythm     Gastrointestinal: Soft, Non-tender, Non-distended     Extremities: No calf tenderness

## 2024-06-05 NOTE — H&P ADULT - ASSESSMENT
83F on ASA81, Gujarrati speaking, h/o DM, HTN, HLD, chronic LBP, p/f inability to move Rt side at 430AM today. 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 CTA negative. SBP in the 180s. Plts/coags wnl. Exam: EOV, awake, Ox3, brisk FC, Lt side 5/5, RUE 4-/5, RLE 3/5   -Adm NSCU under Dr. Echeverria, q1h neuro checks and EVD/hydro watch   -Cardene gtt for -160   -DDAVP  -ARU   -4h rpt CTH for stability   -Na goals 145-155. Current Na 139

## 2024-06-05 NOTE — DISCHARGE NOTE NURSING/CASE MANAGEMENT/SOCIAL WORK - PATIENT PORTAL LINK FT
You can access the FollowMyHealth Patient Portal offered by [facility] by registering at the following website: [email or web address]. By joining [employer]’s FollowMyHealth portal, you will also be able to view your health information using other applications (apps) compatible with our system.

## 2024-06-05 NOTE — ED PROVIDER NOTE - PROGRESS NOTE DETAILS
L thalamic bleed on CT, endorsed to neuro Sx at [facility] for transfer  pt. and son notified of plan of care--in agreement

## 2024-06-05 NOTE — GOALS OF CARE CONVERSATION - ADVANCED CARE PLANNING - CONVERSATION DETAILS
DNR, would not like CPR if CA were to occur, trial of invasive or non-invasive ventilation is okay, no other limitations to care or medical intervention.

## 2024-06-05 NOTE — ED PROVIDER NOTE - PHYSICAL EXAMINATION
Gen: AAOx3, NAD, laying comfortably in stretcher  Head: ncat, perrla, eomi b/l  Neck: supple, no lymphadenopathy, no midline deviation  Heart: rrr, no m/r/g  Lungs: CTA b/l, no rales/ronchi/wheezes  Abd: soft, nontender, non-distended, no rebound or guarding  Ext: no clubbing/cyanosis/edema  Neuro: +R sided weakness on exam, normal speak in native tongue without slurring, per family, able to communicate with family

## 2024-06-05 NOTE — OCCUPATIONAL THERAPY INITIAL EVALUATION ADULT - DIAGNOSIS, OT EVAL
Patient presents with decreased balance, cognition, coordination strength, endurance impacting ability to perform ADLs and functional mobility

## 2024-06-05 NOTE — ED PROVIDER NOTE - OBJECTIVE STATEMENT
83 Y F H/O HCL p/w new onset R. sided weakness, [place] LKN 0400 am hypertensive, 190s systolic started on nicardipine drip, no AC but + ASA last dose yesterday. 83 Y F H/O HCL p/w new onset R. sided weakness, [place] LKN 0400 am hypertensive, 190s systolic started on nicardipine drip, no AC but + ASA last dose yesterday. presenting with persistent weakness Lt side 5/5, RUE 4-/5, RLE 3/5.

## 2024-06-05 NOTE — ED ADULT NURSE NOTE - OBJECTIVE STATEMENT
pt BIBEMS, alert, awake, non english speaker, family at bedside interpreting, as per family member, pt answering questions correctly, a&ox2/3. LKW at 4:30am this morning, son found her on toilet after she called for him because she could not get up. Noted right-sided weakness upon EMS arrival. Pt placed on 15L nonrebreather out in field. Dr. Rg called to bedside, right sided weakness noted. IV placed R forearm #20g, bloods drawn, pt placed on portable monitor and bought to CT scan. Pt placed in RESCB, 2nd IV placed, patent and intact, EKG completed. Keppra and nicardipine started as per MD order. PMH - DM, HTN, HLD. as per family member, pt at baseline a&ox4, able to ambulate inside of home, uses walker outside of home. pt BIBEMS, alert, awake, non english speaker, family at bedside interpreting, as per family member, pt answering questions correctly, a&ox2/3. LKW at 4:30am this morning, son found her on toilet after she called for him because she could not get up. Noted right-sided weakness upon EMS arrival. Pt placed on 15L nonrebreather out in field. Dr. Rg called to bedside, right sided weakness noted. IV placed R forearm #20g, bloods drawn, pt placed on portable monitor, FS done, and bought to CT scan. Pt placed in RESCB, 2nd IV placed, patent and intact, EKG completed. Keppra and nicardipine started as per MD order. PMH - DM, HTN, HLD. as per family member, pt at baseline a&ox4, able to ambulate inside of home, uses walker outside of home.

## 2024-06-05 NOTE — OCCUPATIONAL THERAPY INITIAL EVALUATION ADULT - LEVEL OF INDEPENDENCE: SUPINE/SIT, REHAB EVAL
brought in by family for stomach ache for past week. denies NVD. patient in no distress at triage. maximum assist (25% patients effort)

## 2024-06-05 NOTE — PATIENT PROFILE ADULT - ARRIVAL FROM
Bill [id number] For Specimen Handling/Conveyance To Laboratory?: no Wound Care: Petrolatum Lab Facility: [id number] Size Of Lesion In Cm: 0.5 Anesthesia Type: 1% lidocaine with epinephrine Additional Anesthesia Volume In Cc (Will Not Render If 0): 0 Cryotherapy Text: The wound bed was treated with cryotherapy after the biopsy was performed. Type Of Destruction Used: Curettage Billing Type: Third-Party Bill Biopsy Type: H and E Depth Of Biopsy: dermis Electrodesiccation Text: The wound bed was treated with electrodesiccation after the biopsy was performed. Silver Nitrate Text: The wound bed was treated with silver nitrate after the biopsy was performed. Dressing: Band-Aid Post-Care Instructions: I reviewed with the patient in detail post-care instructions. Patient is to keep the biopsy site dry overnight, and then apply vaseline daily until healed. Electrodesiccation And Curettage Text: The wound bed was treated with electrodesiccation and curettage after the biopsy was performed. Consent: Verbal consent was obtained and risks were reviewed including but not limited to scarring, infection, bleeding, scabbing, incomplete removal, nerve damage and allergy to anesthesia. Curettage Text: The wound bed was treated with curettage after the biopsy was performed. Detail Level: Detailed Lab: [id number] Biopsy Method: double edge Personna blade Hemostasis: Aluminum Chloride Notification Instructions: Patient will be notified of biopsy results. However, patient instructed to call the office if not contacted within 2 weeks. Was A Bandage Applied: Yes Hospitals[facility] Facilities

## 2024-06-05 NOTE — PROGRESS NOTE ADULT - ASSESSMENT
ASSESSMENT/PLAN: Right thalamic ICH with IVH    NEURO:  NeuroQ1, Vitals Q1  4 hour interval r CTH  Hydrocephalus watch plan for EVD as per neurosgx    Activity: [] OOB as tolerated [] Bedrest [] PT [] OT [] PMNR    PULM:    CV:  SBP goal    RENAL:  Fluids:    GI:  Diet:  GI prophylaxis [] not indicated [] PPI [] other:  Bowel regimen [] colace [] senna [] other:    ENDO:   Goal euglycemia (-180)    HEME/ONC:  VTE prophylaxis: [] SCDs [] chemoprophylaxis [] high risk of DVT/PE on admission due to:    ID:    MISC:    SOCIAL/FAMILY:  [] updated at bedside [] family meeting    CODE STATUS:  [] Full Code [] DNR [] DNI [] Palliative/Comfort Care    DISPOSITION:  [] ICU [] Stroke Unit [] Floor [] EMU [] RCU [] PCU    [] Patient is at high risk of neurologic deterioration/death due to:     Time seen:  Time spent: ___ [] critical care minutes ASSESSMENT/PLAN: Right thalamic ICH with IVH    NEURO:  NeuroQ1, Vitals Q1  4 hour interval r CTH  Hydrocephalus watch plan for EVD as per neurosgx  Activity: [] OOB as tolerated [] Bedrest [] PT [] OT [] PMNR    PULM:  2L NC  sat > 92%    CV:  SBP goal: 110-160  TTE    RENAL:  Fluids: 2 % at 50 ccs/hour    GI:  Diet:  GI prophylaxis [] not indicated [] PPI [] other:  Bowel regimen [] colace [] senna [] other:    ENDO:   Goal euglycemia (-180)  ISS    HEME/ONC:  VTE prophylaxis: [] SCDs [] chemoprophylaxis [] high risk of DVT/PE on admission due to:  s/p DDAVP  LEDs    ID:    MISC:    SOCIAL/FAMILY:  [] updated at bedside [] family meeting    CODE STATUS:  [] Full Code [x] DNR [] DNI [] Palliative/Comfort Care    DISPOSITION:  [] ICU [] Stroke Unit [] Floor [] EMU [] RCU [] PCU    [] Patient is at high risk of neurologic deterioration/death due to:     Time seen:  Time spent: ___ [] critical care minutes

## 2024-06-05 NOTE — ED CLERICAL - DIVISION
[facility]... Information: Selecting Yes will display possible errors in your note based on the variables you have selected. This validation is only offered as a suggestion for you. PLEASE NOTE THAT THE VALIDATION TEXT WILL BE REMOVED WHEN YOU FINALIZE YOUR NOTE. IF YOU WANT TO FAX A PRELIMINARY NOTE YOU WILL NEED TO TOGGLE THIS TO 'NO' IF YOU DO NOT WANT IT IN YOUR FAXED NOTE.

## 2024-06-05 NOTE — CONSULT NOTE ADULT - ASSESSMENT
83y F with Hx HTN, HLD, DM, spinal stenosis, who presents w/ CC of abnormal CTH    LKW: 4am 6/5  NIHSS: 7  MRS: 1    Impression: R-sided weakness, drift, and Babinski, c/w L thalamic hemorrhage with intraventricular hemorrhage seen on CTH. Stable interval scan. Possible etiology is hypertensive, but would proceed w/ appropriate stroke workup    Recommendations:  [] repeat CTH w/o contrast at 24h  [] MR brain w/ contrast, MRA head w/o contrast, MRA neck w/ contrast  [] hold AC/AP  [] goal BP <140/90  [] frequent neuro checks per ICU protocol  [] check A1c and lipid panel  [] TTE  [] PT evaluation when able    Case discussed w/ stroke fellow Dr Proctor. Will be formally staffed on AM rounds. 83y F with Hx HTN, HLD, DM, spinal stenosis, who presents w/ CC of abnormal CTH    LKW: 4am 6/5  NIHSS: 7  MRS: 1    Impression: R-sided weakness, drift, and Babinski, c/w L thalamic hemorrhage with intraventricular hemorrhage seen on CTH. Stable interval scan. Possible etiology is hypertensive, but would proceed w/ appropriate stroke workup    Recommendations:  [] repeat CTH w/o contrast at 24h  [] MR brain w/ contrast  [] hold AC/AP  [] goal BP <140/90  [] frequent neuro checks per ICU protocol  [] check A1c and lipid panel  [] TTE  [] PT evaluation when able    Case discussed w/ stroke fellow Dr Proctor. Will be formally staffed on AM rounds.

## 2024-06-05 NOTE — ED PROVIDER NOTE - OBJECTIVE STATEMENT
Gujarrati speaking pt. prefers that family member translate at bedside:  82 yo F with R sided weakness noticed just after 4:30 am by family.  Pt. got up to use bathroom acting normally at 4:30 and shortly after called for help unable to move R side.  Pt. has no other complaints, no recent trauma or inciting event.    ROS: negative for fever, cough, headache, chest pain, shortness of breath, abd pain, nausea, vomiting, diarrhea, rash, and paresthesia--all other systems reviewed are negative.   PMH: diabetes, chronic back pain sciatica RA; Meds: See EMR for list; SH: Denies smoking/drinking/drug use Gujarrati speaking pt. prefers that family member translate at bedside:  82 yo F with R sided weakness noticed just after 4:30 am by family.  Pt. got up to use bathroom acting normally at 4:30 and shortly after called for help unable to move R side.  Pt. has no other complaints, no recent trauma or inciting event.    ROS: negative for fever, cough, headache, chest pain, shortness of breath, abd pain, nausea, vomiting, diarrhea, rash, and paresthesia--all other systems reviewed are negative.   PMH: DM, HTN, neuropathy, HLD, chronic back pain sciatica RA; Meds: metformin, glipizide, losartan, gabapentin, atorvastatin, omeprazole, iron, asa 81, baclofen, vit d3, centrum MV; SH: Denies smoking/drinking/drug use

## 2024-06-05 NOTE — PROGRESS NOTE ADULT - SUBJECTIVE AND OBJECTIVE BOX
HOSPITAL COURSE: 83F on ASA81, Gujarrati speaking, h/o DM, HTN, HLD, chronic LBP, p/f inability to move Rt side at 430AM today. 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 CTA negative.        Admission Scores  GCS:   HH:   MF:   NIHSS:   RASS:    CAM-ICU:   ICH: 3    24 hour Events:       Allergies    penicillin (Unknown)    Intolerances        REVIEW OF SYSTEMS: [ ] Unable to Assess due to neurologic exam   [ ] All ROS addressed below are non-contributory, except:  Neuro: [ ] Headache [ ] Back pain [ ] Numbness [ ] Weakness [ ] Ataxia [ ] Dizziness [ ] Aphasia [ ] Dysarthria [ ] Visual disturbance  Resp: [ ] Shortness of breath/dyspnea, [ ] Orthopnea [ ] Cough  CV: [ ] Chest pain [ ] Palpitation [ ] Lightheadedness [ ] Syncope  Renal: [ ] Thirst [ ] Edema  GI: [ ] Nausea [ ] Emesis [ ] Abdominal pain [ ] Constipation [ ] Diarrhea  Hem: [ ] Hematemesis [ ] bright red blood per rectum  ID: [ ] Fever [ ] Chills [ ] Dysuria  ENT: [ ] Rhinorrhea      DEVICES:   [ ] Restraints [ ] ET tube [ ] central line [ ] arterial line [ ] gomez [ ] NGT/OGT [ ] EVD [ ] LD [ ] SANDRO/HMV [ ] Trach [ ] PEG [ ] Chest Tube     VITALS:   Vital Signs Last 24 Hrs  T(C): --  T(F): --  HR: 95 (05 Jun 2024 07:29) (95 - 98)  BP: 150/73 (05 Jun 2024 07:29) (150/73 - 158/73)  BP(mean): 96 (05 Jun 2024 07:12) (96 - 116)  RR: 23 (05 Jun 2024 07:29) (17 - 24)  SpO2: 99% (05 Jun 2024 07:29) (99% - 99%)    Parameters below as of 05 Jun 2024 07:29  Patient On (Oxygen Delivery Method): nasal cannula  O2 Flow (L/min): 5    CAPILLARY BLOOD GLUCOSE        I&O's Summary      Respiratory:        LABS:                        12.1   14.96 )-----------( 297      ( 05 Jun 2024 07:31 )             38.6                MEDICATION LEVELS:     IVF FLUIDS/MEDICATIONS:   MEDICATIONS  (STANDING):  desmopressin IVPB 35 MICROGram(s) IV Intermittent Once  niCARdipine Infusion 5 mG/Hr (25 mL/Hr) IV Continuous <Continuous>  sodium chloride 2% + sodium acetate 50:50 1000 milliLiter(s) (50 mL/Hr) IV Continuous <Continuous>    MEDICATIONS  (PRN):        IMAGING:      EXAMINATION:  PHYSICAL EXAM:    Constitutional: No Acute Distress     Neurological: EOV, awake, Ox3, brisk FC, Lt side 5/5, RUE 4-/5, RLE 3/5     Pulmonary: Clear to Auscultation, No rales, No rhonchi, No wheezes     Cardiovascular: S1, S2, Regular rate and rhythm     Gastrointestinal: Soft, Non-tender, Non-distended     Extremities: No calf tenderness     Incision:    HOSPITAL COURSE: 83F on ASA81, Gujarrati speaking, h/o DM, HTN, HLD, chronic LBP, p/f inability to move Rt side at 430AM today. 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 CTA negative.        Admission Scores  GCS:   HH:   MF:   NIHSS:   RASS:    CAM-ICU:   ICH: 3    24 hour Events:       Allergies    penicillin (Unknown)    Intolerances        REVIEW OF SYSTEMS: [ ] Unable to Assess due to neurologic exam   [ ] All ROS addressed below are non-contributory, except:  Neuro: [ ] Headache [ ] Back pain [ ] Numbness [ ] Weakness [ ] Ataxia [ ] Dizziness [ ] Aphasia [ ] Dysarthria [ ] Visual disturbance  Resp: [ ] Shortness of breath/dyspnea, [ ] Orthopnea [ ] Cough  CV: [ ] Chest pain [ ] Palpitation [ ] Lightheadedness [ ] Syncope  Renal: [ ] Thirst [ ] Edema  GI: [ ] Nausea [ ] Emesis [ ] Abdominal pain [ ] Constipation [ ] Diarrhea  Hem: [ ] Hematemesis [ ] bright red blood per rectum  ID: [ ] Fever [ ] Chills [ ] Dysuria  ENT: [ ] Rhinorrhea      DEVICES:   [ ] Restraints [ ] ET tube [ ] central line [ ] arterial line [ ] gomez [ ] NGT/OGT [ ] EVD [ ] LD [ ] SANDRO/HMV [ ] Trach [ ] PEG [ ] Chest Tube     ICU Vital Signs Last 24 Hrs  T(C): 36.5 (05 Jun 2024 09:25), Max: 36.5 (05 Jun 2024 06:09)  T(F): 97.7 (05 Jun 2024 09:25), Max: 97.7 (05 Jun 2024 06:09)  HR: 88 (05 Jun 2024 09:45) (65 - 98)  BP: 138/59 (05 Jun 2024 09:30) (133/76 - 210/94)  BP(mean): 85 (05 Jun 2024 09:30) (85 - 116)  ABP: --  ABP(mean): --  RR: 22 (05 Jun 2024 09:45) (15 - 26)  SpO2: 93% (05 Jun 2024 09:45) (91% - 100%)    O2 Parameters below as of 05 Jun 2024 09:25  Patient On (Oxygen Delivery Method): nasal cannula  O2 Flow (L/min): 4    I&O's Summary    05 Jun 2024 07:01  -  05 Jun 2024 10:00  --------------------------------------------------------  IN: 125 mL / OUT: 325 mL / NET: -200 mL        LABS:                        12.1   14.96 )-----------( 297      ( 05 Jun 2024 07:31 )             38.6                MEDICATION LEVELS:     IVF FLUIDS/MEDICATIONS:   MEDICATIONS  (STANDING):  desmopressin IVPB 35 MICROGram(s) IV Intermittent Once  niCARdipine Infusion 5 mG/Hr (25 mL/Hr) IV Continuous <Continuous>  sodium chloride 2% + sodium acetate 50:50 1000 milliLiter(s) (50 mL/Hr) IV Continuous <Continuous>    MEDICATIONS  (PRN):        IMAGING:      EXAMINATION:  PHYSICAL EXAM:    Constitutional: No Acute Distress     Neurological: EOV, awake, Ox3, brisk FC, Lt side 5/5, RUE 4-/5, RLE 3/5     Pulmonary: Clear to Auscultation, No rales, No rhonchi, No wheezes     Cardiovascular: S1, S2, Regular rate and rhythm     Gastrointestinal: Soft, Non-tender, Non-distended     Extremities: No calf tenderness     Incision:

## 2024-06-05 NOTE — OCCUPATIONAL THERAPY INITIAL EVALUATION ADULT - NS ASR FOLLOW COMMAND OT EVAL
increased processing time, with multiple tactile visual cues/75% of the time/50% of the time/able to follow single-step instructions

## 2024-06-06 LAB
ANION GAP SERPL CALC-SCNC: 13 MMOL/L — SIGNIFICANT CHANGE UP (ref 5–17)
ANION GAP SERPL CALC-SCNC: 14 MMOL/L — SIGNIFICANT CHANGE UP (ref 5–17)
ANION GAP SERPL CALC-SCNC: 15 MMOL/L — SIGNIFICANT CHANGE UP (ref 5–17)
ANION GAP SERPL CALC-SCNC: 16 MMOL/L — SIGNIFICANT CHANGE UP (ref 5–17)
BUN SERPL-MCNC: 12 MG/DL — SIGNIFICANT CHANGE UP (ref 7–23)
BUN SERPL-MCNC: 13 MG/DL — SIGNIFICANT CHANGE UP (ref 7–23)
BUN SERPL-MCNC: 14 MG/DL — SIGNIFICANT CHANGE UP (ref 7–23)
BUN SERPL-MCNC: 15 MG/DL — SIGNIFICANT CHANGE UP (ref 7–23)
CALCIUM SERPL-MCNC: 9 MG/DL — SIGNIFICANT CHANGE UP (ref 8.4–10.5)
CALCIUM SERPL-MCNC: 9 MG/DL — SIGNIFICANT CHANGE UP (ref 8.4–10.5)
CALCIUM SERPL-MCNC: 9.1 MG/DL — SIGNIFICANT CHANGE UP (ref 8.4–10.5)
CALCIUM SERPL-MCNC: 9.3 MG/DL — SIGNIFICANT CHANGE UP (ref 8.4–10.5)
CHLORIDE SERPL-SCNC: 102 MMOL/L — SIGNIFICANT CHANGE UP (ref 96–108)
CHLORIDE SERPL-SCNC: 102 MMOL/L — SIGNIFICANT CHANGE UP (ref 96–108)
CHLORIDE SERPL-SCNC: 109 MMOL/L — HIGH (ref 96–108)
CHLORIDE SERPL-SCNC: 110 MMOL/L — HIGH (ref 96–108)
CO2 SERPL-SCNC: 18 MMOL/L — LOW (ref 22–31)
CO2 SERPL-SCNC: 20 MMOL/L — LOW (ref 22–31)
CO2 SERPL-SCNC: 21 MMOL/L — LOW (ref 22–31)
CO2 SERPL-SCNC: 22 MMOL/L — SIGNIFICANT CHANGE UP (ref 22–31)
CREAT SERPL-MCNC: 0.63 MG/DL — SIGNIFICANT CHANGE UP (ref 0.5–1.3)
CREAT SERPL-MCNC: 0.64 MG/DL — SIGNIFICANT CHANGE UP (ref 0.5–1.3)
CREAT SERPL-MCNC: 0.65 MG/DL — SIGNIFICANT CHANGE UP (ref 0.5–1.3)
CREAT SERPL-MCNC: 0.68 MG/DL — SIGNIFICANT CHANGE UP (ref 0.5–1.3)
EGFR: 87 ML/MIN/1.73M2 — SIGNIFICANT CHANGE UP
EGFR: 88 ML/MIN/1.73M2 — SIGNIFICANT CHANGE UP
EGFR: 89 ML/MIN/1.73M2 — SIGNIFICANT CHANGE UP
EGFR: 89 ML/MIN/1.73M2 — SIGNIFICANT CHANGE UP
GLUCOSE BLDC GLUCOMTR-MCNC: 123 MG/DL — HIGH (ref 70–99)
GLUCOSE BLDC GLUCOMTR-MCNC: 125 MG/DL — HIGH (ref 70–99)
GLUCOSE BLDC GLUCOMTR-MCNC: 152 MG/DL — HIGH (ref 70–99)
GLUCOSE BLDC GLUCOMTR-MCNC: 167 MG/DL — HIGH (ref 70–99)
GLUCOSE BLDC GLUCOMTR-MCNC: 179 MG/DL — HIGH (ref 70–99)
GLUCOSE BLDC GLUCOMTR-MCNC: 183 MG/DL — HIGH (ref 70–99)
GLUCOSE SERPL-MCNC: 142 MG/DL — HIGH (ref 70–99)
GLUCOSE SERPL-MCNC: 151 MG/DL — HIGH (ref 70–99)
GLUCOSE SERPL-MCNC: 163 MG/DL — HIGH (ref 70–99)
GLUCOSE SERPL-MCNC: 182 MG/DL — HIGH (ref 70–99)
HCT VFR BLD CALC: 36.7 % — SIGNIFICANT CHANGE UP (ref 34.5–45)
HGB BLD-MCNC: 11.4 G/DL — LOW (ref 11.5–15.5)
MAGNESIUM SERPL-MCNC: 1.6 MG/DL — SIGNIFICANT CHANGE UP (ref 1.6–2.6)
MAGNESIUM SERPL-MCNC: 2.2 MG/DL — SIGNIFICANT CHANGE UP (ref 1.6–2.6)
MAGNESIUM SERPL-MCNC: 2.4 MG/DL — SIGNIFICANT CHANGE UP (ref 1.6–2.6)
MCHC RBC-ENTMCNC: 26.3 PG — LOW (ref 27–34)
MCHC RBC-ENTMCNC: 31.1 GM/DL — LOW (ref 32–36)
MCV RBC AUTO: 84.8 FL — SIGNIFICANT CHANGE UP (ref 80–100)
NRBC # BLD: 0 /100 WBCS — SIGNIFICANT CHANGE UP (ref 0–0)
PHOSPHATE SERPL-MCNC: 2.6 MG/DL — SIGNIFICANT CHANGE UP (ref 2.5–4.5)
PHOSPHATE SERPL-MCNC: 3 MG/DL — SIGNIFICANT CHANGE UP (ref 2.5–4.5)
PHOSPHATE SERPL-MCNC: 3.1 MG/DL — SIGNIFICANT CHANGE UP (ref 2.5–4.5)
PLATELET # BLD AUTO: 301 K/UL — SIGNIFICANT CHANGE UP (ref 150–400)
POTASSIUM SERPL-MCNC: 3.9 MMOL/L — SIGNIFICANT CHANGE UP (ref 3.5–5.3)
POTASSIUM SERPL-MCNC: 4 MMOL/L — SIGNIFICANT CHANGE UP (ref 3.5–5.3)
POTASSIUM SERPL-MCNC: 4.1 MMOL/L — SIGNIFICANT CHANGE UP (ref 3.5–5.3)
POTASSIUM SERPL-MCNC: 4.4 MMOL/L — SIGNIFICANT CHANGE UP (ref 3.5–5.3)
POTASSIUM SERPL-SCNC: 3.9 MMOL/L — SIGNIFICANT CHANGE UP (ref 3.5–5.3)
POTASSIUM SERPL-SCNC: 4 MMOL/L — SIGNIFICANT CHANGE UP (ref 3.5–5.3)
POTASSIUM SERPL-SCNC: 4.1 MMOL/L — SIGNIFICANT CHANGE UP (ref 3.5–5.3)
POTASSIUM SERPL-SCNC: 4.4 MMOL/L — SIGNIFICANT CHANGE UP (ref 3.5–5.3)
RBC # BLD: 4.33 M/UL — SIGNIFICANT CHANGE UP (ref 3.8–5.2)
RBC # FLD: 15.3 % — HIGH (ref 10.3–14.5)
SODIUM SERPL-SCNC: 139 MMOL/L — SIGNIFICANT CHANGE UP (ref 135–145)
SODIUM SERPL-SCNC: 139 MMOL/L — SIGNIFICANT CHANGE UP (ref 135–145)
SODIUM SERPL-SCNC: 140 MMOL/L — SIGNIFICANT CHANGE UP (ref 135–145)
SODIUM SERPL-SCNC: 144 MMOL/L — SIGNIFICANT CHANGE UP (ref 135–145)
WBC # BLD: 11.12 K/UL — HIGH (ref 3.8–10.5)
WBC # FLD AUTO: 11.12 K/UL — HIGH (ref 3.8–10.5)

## 2024-06-06 PROCEDURE — 99291 CRITICAL CARE FIRST HOUR: CPT

## 2024-06-06 RX ORDER — LOSARTAN POTASSIUM 100 MG/1
100 TABLET, FILM COATED ORAL DAILY
Refills: 0 | Status: DISCONTINUED | OUTPATIENT
Start: 2024-06-07 | End: 2024-06-07

## 2024-06-06 RX ORDER — MAGNESIUM SULFATE 500 MG/ML
2 VIAL (ML) INJECTION ONCE
Refills: 0 | Status: COMPLETED | OUTPATIENT
Start: 2024-06-06 | End: 2024-06-06

## 2024-06-06 RX ORDER — LOSARTAN POTASSIUM 100 MG/1
50 TABLET, FILM COATED ORAL ONCE
Refills: 0 | Status: COMPLETED | OUTPATIENT
Start: 2024-06-06 | End: 2024-06-06

## 2024-06-06 RX ADMIN — Medication 2: at 23:32

## 2024-06-06 RX ADMIN — CHLORHEXIDINE GLUCONATE 1 APPLICATION(S): 213 SOLUTION TOPICAL at 21:07

## 2024-06-06 RX ADMIN — SODIUM CHLORIDE 50 MILLILITER(S): 5 INJECTION, SOLUTION INTRAVENOUS at 19:08

## 2024-06-06 RX ADMIN — Medication 2: at 11:19

## 2024-06-06 RX ADMIN — Medication 400 MILLIGRAM(S): at 11:18

## 2024-06-06 RX ADMIN — Medication 25 GRAM(S): at 03:26

## 2024-06-06 RX ADMIN — Medication 1000 MILLIGRAM(S): at 11:48

## 2024-06-06 RX ADMIN — POLYETHYLENE GLYCOL 3350 17 GRAM(S): 17 POWDER, FOR SOLUTION ORAL at 11:18

## 2024-06-06 RX ADMIN — SENNA PLUS 2 TABLET(S): 8.6 TABLET ORAL at 01:08

## 2024-06-06 RX ADMIN — Medication 400 MILLIGRAM(S): at 19:45

## 2024-06-06 RX ADMIN — Medication 1000 MILLIGRAM(S): at 20:00

## 2024-06-06 RX ADMIN — Medication 10 MILLIGRAM(S): at 11:18

## 2024-06-06 RX ADMIN — LOSARTAN POTASSIUM 50 MILLIGRAM(S): 100 TABLET, FILM COATED ORAL at 05:00

## 2024-06-06 RX ADMIN — ONDANSETRON 4 MILLIGRAM(S): 8 TABLET, FILM COATED ORAL at 16:59

## 2024-06-06 RX ADMIN — ATORVASTATIN CALCIUM 10 MILLIGRAM(S): 80 TABLET, FILM COATED ORAL at 21:11

## 2024-06-06 RX ADMIN — SENNA PLUS 2 TABLET(S): 8.6 TABLET ORAL at 21:06

## 2024-06-06 RX ADMIN — LOSARTAN POTASSIUM 50 MILLIGRAM(S): 100 TABLET, FILM COATED ORAL at 01:19

## 2024-06-06 RX ADMIN — LOSARTAN POTASSIUM 50 MILLIGRAM(S): 100 TABLET, FILM COATED ORAL at 11:19

## 2024-06-06 RX ADMIN — GABAPENTIN 300 MILLIGRAM(S): 400 CAPSULE ORAL at 11:19

## 2024-06-06 RX ADMIN — Medication 2: at 17:10

## 2024-06-06 RX ADMIN — NICARDIPINE HYDROCHLORIDE 25 MG/HR: 30 CAPSULE, EXTENDED RELEASE ORAL at 19:08

## 2024-06-06 NOTE — PROGRESS NOTE ADULT - SUBJECTIVE AND OBJECTIVE BOX
Podiatry pager #: [phone]/ [phone]    Patient is a 83y old  Female who presents with a chief complaint of IPH (07 Jun 2024 00:44)      HPI:  83F on ASA81, Gujarrati speaking, h/o DM, HTN, HLD, chronic LBP, p/f inability to move Rt side at 430AM today. 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 CTA negative. SBP in the 180s. Plts/coags wnl. Exam: EOV, awake, Ox3, brisk FC, Lt side 5/5, RUE 4-/5, RLE 3/5  (05 Jun 2024 07:26)      PAST MEDICAL & SURGICAL HISTORY:  HTN (hypertension)      DM (diabetes mellitus)          MEDICATIONS  (STANDING):  atorvastatin 10 milliGRAM(s) Oral at bedtime  baclofen 5 milliGRAM(s) Oral every 8 hours  chlorhexidine 4% Liquid 1 Application(s) Topical daily  gabapentin 300 milliGRAM(s) Oral daily  insulin lispro (ADMELOG) corrective regimen sliding scale   SubCutaneous Before meals and at bedtime  labetalol 100 milliGRAM(s) Oral three times a day  losartan 100 milliGRAM(s) Oral daily  pantoprazole    Tablet 40 milliGRAM(s) Oral before breakfast  polyethylene glycol 3350 17 Gram(s) Oral daily  senna 2 Tablet(s) Oral at bedtime  sodium chloride 3% + sodium acetate 50:50 1000 milliLiter(s) (50 mL/Hr) IV Continuous <Continuous>    MEDICATIONS  (PRN):  acetaminophen     Tablet .. 650 milliGRAM(s) Oral every 6 hours PRN Mild Pain (1 - 3)  ondansetron Injectable 4 milliGRAM(s) IV Push every 6 hours PRN Nausea and/or Vomiting      Allergies    penicillin (Unknown)  quinine (Unknown)    Intolerances        VITALS:    Vital Signs Last 24 Hrs  T(C): 36.2 (07 Jun 2024 15:00), Max: 37.1 (06 Jun 2024 23:00)  T(F): 97.1 (07 Jun 2024 15:00), Max: 98.7 (06 Jun 2024 23:00)  HR: 76 (07 Jun 2024 17:00) (64 - 113)  BP: 152/67 (07 Jun 2024 17:00) (107/53 - 185/83)  BP(mean): 96 (07 Jun 2024 17:00) (77 - 119)  RR: 20 (07 Jun 2024 17:00) (14 - 28)  SpO2: 98% (07 Jun 2024 17:00) (92% - 100%)    Parameters below as of 07 Jun 2024 11:25  Patient On (Oxygen Delivery Method): nasal cannula  O2 Flow (L/min): 2      LABS:                          11.4   11.12 )-----------( 301      ( 06 Jun 2024 01:30 )             36.7       06-07    148<H>  |  115<H>  |  16  ----------------------------<  225<H>  5.1   |  17<L>  |  0.59    Ca    9.7      07 Jun 2024 14:12  Phos  2.7     06-07  Mg     2.0     06-07        CAPILLARY BLOOD GLUCOSE      POCT Blood Glucose.: 208 mg/dL (07 Jun 2024 14:26)  POCT Blood Glucose.: 171 mg/dL (07 Jun 2024 07:44)  POCT Blood Glucose.: 152 mg/dL (07 Jun 2024 05:05)  POCT Blood Glucose.: 179 mg/dL (06 Jun 2024 23:30)          LOWER EXTREMITY PHYSICAL EXAM:    Vasular: DP/PT 1/4, B/L, CFT <2seconds B/L, Temperature gradient _wnl, B/L.   Neuro: Epicritic sensation _diminished  to the level of _toes, B/L.  Skin: Right heel DTI: Blanchable erythema negative bulla negative open ulcerations no fluctuance purulence malodor or clinical signs of infection.  Left third toe superficial DTI with mild ecchymosis and erythema no open ulcerations or clinical signs of infection.      RADIOLOGY & ADDITIONAL STUDIES:

## 2024-06-06 NOTE — PROGRESS NOTE ADULT - SUBJECTIVE AND OBJECTIVE BOX
HOSPITAL COURSE: 83F on ASA81, Gujurati speaking, h/o DM, HTN, HLD, chronic LBP, p/f inability to move Rt side at 430AM today. 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 CTA negative.    24 hour Events:       Allergies    penicillin (Unknown)  quinine (Unknown)    Intolerances        REVIEW OF SYSTEMS: [ ] Unable to Assess due to neurologic exam   [ ] All ROS addressed below are non-contributory, except:  Neuro: [ ] Headache [ ] Back pain [ ] Numbness [ ] Weakness [ ] Ataxia [ ] Dizziness [ ] Aphasia [ ] Dysarthria [ ] Visual disturbance  Resp: [ ] Shortness of breath/dyspnea, [ ] Orthopnea [ ] Cough  CV: [ ] Chest pain [ ] Palpitation [ ] Lightheadedness [ ] Syncope  Renal: [ ] Thirst [ ] Edema  GI: [ ] Nausea [ ] Emesis [ ] Abdominal pain [ ] Constipation [ ] Diarrhea  Hem: [ ] Hematemesis [ ] bright red blood per rectum  ID: [ ] Fever [ ] Chills [ ] Dysuria  ENT: [ ] Rhinorrhea      DEVICES:   [ ] Restraints [ ] ET tube [ ] central line [ ] arterial line [ ] gomez [ ] NGT/OGT [ ] EVD [ ] LD [ ] SANDRO/HMV [ ] Trach [ ] PEG [ ] Chest Tube     VITALS:   Vital Signs Last 24 Hrs  T(C): 36.4 (06 Jun 2024 03:00), Max: 36.7 (05 Jun 2024 15:00)  T(F): 97.6 (06 Jun 2024 03:00), Max: 98.1 (05 Jun 2024 15:00)  HR: 77 (06 Jun 2024 06:00) (58 - 100)  BP: 137/62 (06 Jun 2024 06:00) (107/70 - 176/76)  BP(mean): 89 (06 Jun 2024 06:00) (62 - 116)  RR: 18 (06 Jun 2024 06:00) (12 - 26)  SpO2: 99% (06 Jun 2024 06:00) (91% - 100%)    Parameters below as of 05 Jun 2024 19:00  Patient On (Oxygen Delivery Method): room air      CAPILLARY BLOOD GLUCOSE      POCT Blood Glucose.: 123 mg/dL (06 Jun 2024 05:02)  POCT Blood Glucose.: 125 mg/dL (06 Jun 2024 01:15)  POCT Blood Glucose.: 126 mg/dL (05 Jun 2024 16:15)  POCT Blood Glucose.: 193 mg/dL (05 Jun 2024 12:19)    I&O's Summary    05 Jun 2024 07:01  -  06 Jun 2024 06:32  --------------------------------------------------------  IN: 1425 mL / OUT: 2195 mL / NET: -770 mL        Respiratory:        LABS:                        11.4   11.12 )-----------( 301      ( 06 Jun 2024 01:30 )             36.7     06-06    139  |  102  |  13  ----------------------------<  142<H>  4.1   |  22  |  0.63             MEDICATION LEVELS:   Ammonia, Serum: 26 umol/L (06-05 @ 05:52)    IVF FLUIDS/MEDICATIONS:   MEDICATIONS  (STANDING):  atorvastatin 10 milliGRAM(s) Oral at bedtime  baclofen 10 milliGRAM(s) Oral daily  chlorhexidine 4% Liquid 1 Application(s) Topical daily  gabapentin 300 milliGRAM(s) Oral daily  insulin lispro (ADMELOG) corrective regimen sliding scale   SubCutaneous every 6 hours  losartan 50 milliGRAM(s) Oral daily  niCARdipine Infusion 5 mG/Hr (25 mL/Hr) IV Continuous <Continuous>  pantoprazole    Tablet 40 milliGRAM(s) Oral before breakfast  polyethylene glycol 3350 17 Gram(s) Oral daily  senna 2 Tablet(s) Oral at bedtime  sodium chloride 3%. 500 milliLiter(s) (50 mL/Hr) IV Continuous <Continuous>    MEDICATIONS  (PRN):  acetaminophen   IVPB .. 1000 milliGRAM(s) IV Intermittent every 6 hours PRN Mild Pain (1 - 3)  ondansetron Injectable 4 milliGRAM(s) IV Push every 6 hours PRN Nausea and/or Vomiting        IMAGING:      EXAMINATION:  PHYSICAL EXAM:    Constitutional: No Acute Distress     Neurological: Ox2-3, PERRL, right facial, dysarthric. FC, Left side 5/5, RUE 4/5 rue drift, RLE 4/5    Pulmonary: Clear to Auscultation, No rales, No rhonchi, No wheezes     Cardiovascular: S1, S2, Regular rate and rhythm     Gastrointestinal: Soft, Non-tender, Non-distended     Extremities: No calf tenderness    HOSPITAL COURSE: 83F on ASA81, Gujurati speaking, h/o DM, HTN, HLD, chronic LBP, p/f inability to move Rt side at 430AM today. 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 CTA negative.    24 hour Events:     06/06: NO overnight events    Allergies    penicillin (Unknown)  quinine (Unknown)    Intolerances        REVIEW OF SYSTEMS: [ ] Unable to Assess due to neurologic exam   [ ] All ROS addressed below are non-contributory, except:  Neuro: [ ] Headache [ ] Back pain [ ] Numbness [ ] Weakness [ ] Ataxia [ ] Dizziness [ ] Aphasia [ ] Dysarthria [ ] Visual disturbance  Resp: [ ] Shortness of breath/dyspnea, [ ] Orthopnea [ ] Cough  CV: [ ] Chest pain [ ] Palpitation [ ] Lightheadedness [ ] Syncope  Renal: [ ] Thirst [ ] Edema  GI: [ ] Nausea [ ] Emesis [ ] Abdominal pain [ ] Constipation [ ] Diarrhea  Hem: [ ] Hematemesis [ ] bright red blood per rectum  ID: [ ] Fever [ ] Chills [ ] Dysuria  ENT: [ ] Rhinorrhea      DEVICES:   [ ] Restraints [ ] ET tube [ ] central line [ ] arterial line [ ] gomez [ ] NGT/OGT [ ] EVD [ ] LD [ ] SANDRO/HMV [ ] Trach [ ] PEG [ ] Chest Tube     VITALS:   Vital Signs Last 24 Hrs  T(C): 36.4 (06 Jun 2024 03:00), Max: 36.7 (05 Jun 2024 15:00)  T(F): 97.6 (06 Jun 2024 03:00), Max: 98.1 (05 Jun 2024 15:00)  HR: 77 (06 Jun 2024 06:00) (58 - 100)  BP: 137/62 (06 Jun 2024 06:00) (107/70 - 176/76)  BP(mean): 89 (06 Jun 2024 06:00) (62 - 116)  RR: 18 (06 Jun 2024 06:00) (12 - 26)  SpO2: 99% (06 Jun 2024 06:00) (91% - 100%)    Parameters below as of 05 Jun 2024 19:00  Patient On (Oxygen Delivery Method): room air      CAPILLARY BLOOD GLUCOSE      POCT Blood Glucose.: 123 mg/dL (06 Jun 2024 05:02)  POCT Blood Glucose.: 125 mg/dL (06 Jun 2024 01:15)  POCT Blood Glucose.: 126 mg/dL (05 Jun 2024 16:15)  POCT Blood Glucose.: 193 mg/dL (05 Jun 2024 12:19)    I&O's Summary    05 Jun 2024 07:01  -  06 Jun 2024 06:32  --------------------------------------------------------  IN: 1425 mL / OUT: 2195 mL / NET: -770 mL        Respiratory:        LABS:                        11.4   11.12 )-----------( 301      ( 06 Jun 2024 01:30 )             36.7     06-06    139  |  102  |  13  ----------------------------<  142<H>  4.1   |  22  |  0.63             MEDICATION LEVELS:   Ammonia, Serum: 26 umol/L (06-05 @ 05:52)    IVF FLUIDS/MEDICATIONS:   MEDICATIONS  (STANDING):  atorvastatin 10 milliGRAM(s) Oral at bedtime  baclofen 10 milliGRAM(s) Oral daily  chlorhexidine 4% Liquid 1 Application(s) Topical daily  gabapentin 300 milliGRAM(s) Oral daily  insulin lispro (ADMELOG) corrective regimen sliding scale   SubCutaneous every 6 hours  losartan 50 milliGRAM(s) Oral daily  niCARdipine Infusion 5 mG/Hr (25 mL/Hr) IV Continuous <Continuous>  pantoprazole    Tablet 40 milliGRAM(s) Oral before breakfast  polyethylene glycol 3350 17 Gram(s) Oral daily  senna 2 Tablet(s) Oral at bedtime  sodium chloride 3%. 500 milliLiter(s) (50 mL/Hr) IV Continuous <Continuous>    MEDICATIONS  (PRN):  acetaminophen   IVPB .. 1000 milliGRAM(s) IV Intermittent every 6 hours PRN Mild Pain (1 - 3)  ondansetron Injectable 4 milliGRAM(s) IV Push every 6 hours PRN Nausea and/or Vomiting        IMAGING:      EXAMINATION:  PHYSICAL EXAM:    Constitutional: No Acute Distress     Neurological: Ox2-3, PERRL, right facial, dysarthric. FC, Left side 5/5, RUE 4/5 rue drift, RLE 4/5    Pulmonary: Clear to Auscultation, No rales, No rhonchi, No wheezes     Cardiovascular: S1, S2, Regular rate and rhythm     Gastrointestinal: Soft, Non-tender, Non-distended     Extremities: No calf tenderness

## 2024-06-06 NOTE — SWALLOW BEDSIDE ASSESSMENT ADULT - SWALLOW EVAL: DIAGNOSIS
Consult received and appreciated. Chart reviewed. As per discussion with NSCU team Pt being kept NPO for hydro/EVD watch, not medically appropriate to be seen for evaluation at this time. Will reattempt at a later date, as appropriate. This service will continue to follow.
Pt presents with evidence of an oropharyngeal dysphagia notable for prolonged mastication for solids, and s/s laryngeal penetration with thin liquids. Will monitor for diet upgrade, possible instrumental exam.

## 2024-06-06 NOTE — SWALLOW BEDSIDE ASSESSMENT ADULT - COMMENTS
Adm NSCU under Dr. Echeverria, q1h neuro checks and EVD/hydro watch; Cardene gtt for -160  Per ED GOC note: DNR, would not like CPR if CA were to occur, trial of invasive or non-invasive ventilation is okay, no other limitations to care or medical intervention.
Adm NSCU under Dr. Echeverria, q1h neuro checks and EVD/hydro watch; Cardene gtt for -160  Per ED GOC note: DNR, would not like CPR if CA were to occur, trial of invasive or non-invasive ventilation is okay, no other limitations to care or medical intervention.

## 2024-06-06 NOTE — DIETITIAN INITIAL EVALUATION ADULT - REASON FOR ADMISSION
Pt is an 84 yo F with PMH: DM, HTN, HLD, chronic LBP. Presented with inability to move right side. S/P code stroke, transferred from outside hospital for CTH with L thalamic + posterior capsular/corona radiata hemorrhage with IVH, nearly casted 3rd ventricle with hydro in setting of underlying ex vacuo. CTA negative. On hydrocephalus watch.

## 2024-06-06 NOTE — SWALLOW BEDSIDE ASSESSMENT ADULT - SWALLOW EVAL: PATIENT/FAMILY GOALS STATEMENT
Pt and daughter report that Pt occasionally coughs after thin liquids at home prior to this admission.

## 2024-06-06 NOTE — DIETITIAN INITIAL EVALUATION ADULT - OTHER INFO
Dosing wt (6/5): 194.3 lbs vs 184.3 llbs  Wt trend (per [facility]): (6/21/23): 173 lbs.  Dosing wt (6/5): 194.3 lbs vs 184.3 lbs  Wt trend (per [facility]): (6/21/23): 173 lbs.  Son estimated that UBw 140-150 lbs. Will continue to monitor/trend.

## 2024-06-06 NOTE — DIETITIAN INITIAL EVALUATION ADULT - ADD RECOMMEND
1. Defer advancement of diet to medical team. As able, consider no therapeutic restrictions and defer consistency to medical team, SLP. Consider addition of oral nutrition supplements if suboptimal PO intake noted.   2. RD to remain available for enteral tube feed recommendations if warranted. May consider Jevity 1.5 at 45ml/hr x 24 hrs + No Carb Prosource TF Free 1x daily (+90kcal, +15g protein): 1080ml, 1710kcal and 84g protein.   3. Recommend multivitamin, vitamin C (if no medication contraindications) to aid in prevention of micronutrient deficiencies, aid in wound healing.   4. Monitor wt trends/labs/skin integrity/hydration status/bowel regularity.

## 2024-06-06 NOTE — DIETITIAN INITIAL EVALUATION ADULT - ORAL INTAKE PTA/DIET HISTORY
-Son reports pt with "normal" appetite prior to admission; consumed three meals daily, vegetarian (no egg, no fish, dairy OK). Denies food allergies. Denies intolerance to chewing/swallowing. Denies nausea/vomiting; endorsed occasional constipation in setting of medications. Reports that pt takes vitamins/supplements, however son unable to recall which one. Pt unable to participate in nutrition evaluation due to neurological status at time of RD visit.

## 2024-06-06 NOTE — DIETITIAN INITIAL EVALUATION ADULT - PERTINENT LABORATORY DATA
06-06    139  |  102  |  13  ----------------------------<  142<H>  4.1   |  22  |  0.63    Ca    9.0      06 Jun 2024 01:30  Phos  3.0     06-06  Mg     1.6     06-06    TPro  7.5  /  Alb  4.2  /  TBili  0.8  /  DBili  x   /  AST  19  /  ALT  14  /  AlkPhos  63  06-05  POCT Blood Glucose.: 123 mg/dL (06-06-24 @ 05:02)

## 2024-06-06 NOTE — SWALLOW BEDSIDE ASSESSMENT ADULT - ASR SWALLOW ASPIRATION MONITOR
Monitor for s/s aspiration/laryngeal penetration. If noted:  D/C p.o. intake, provide non-oral nutrition/hydration/meds, and contact this service @ [phone]/change of breathing pattern/cough/gurgly voice/fever/pneumonia/throat clearing/upper respiratory infection

## 2024-06-06 NOTE — DIETITIAN INITIAL EVALUATION ADULT - REASON INDICATOR FOR ASSESSMENT
Nutrition Assessment warranted for length of stay.  Information obtained from: RN, comprehensive chart review, interdisciplinary medical rounds  Nutrition Assessment warranted for length of stay.  Information obtained from: RN, comprehensive chart review, interdisciplinary medical rounds, son at bedside

## 2024-06-06 NOTE — PROGRESS NOTE ADULT - ASSESSMENT
Assessment/plan:    Right heel DTI: Stable, noninfected, present on admission  Left third toe early DTI: Stable, noninfected, present on admission    Recommend Betadine paint to left third toe every other day.  Recommend Betadine paint to right heel DTI site every other day with application of Allevyn foam dressing.  Recommend continue decubitus precautions and use of Xeroflo boots while in house.  Reconsult podiatry as needed

## 2024-06-06 NOTE — PROGRESS NOTE ADULT - ASSESSMENT
ASSESSMENT/PLAN: Right thalamic ICH with IVH    NEURO:  NeuroQ1, Vitals Q1  4 hour interval r CTH stable   Gabapentin for pain  baclofen for spasticity   Hydrocephalus watch plan for EVD as per neurosgx  Activity: [x] OOB as tolerated [] Bedrest [] PT [] OT [] PMNR    PULM:  5L NC  sat > 92%    CV:  SBP goal: 110-160  TTE - p   lipitor 10   cardene gtt   restarting losartan 50 qd for HTN home med       RENAL:  Fluids: 3 % at 50 ccs/hour  BMP q 6 140-150 Na goal     GI:  Diet: NPO  GI prophylaxis [] not indicated [x] PPI [] other:  Bowel regimen [] colace [x] senna [] other: miralax     ENDO:   Goal euglycemia (-180)  ISS    HEME/ONC:  VTE prophylaxis: [x] SCDs [] chemoprophylaxis [] high risk of DVT/PE on admission due to:  s/p DDAVP  LED 6/5 neg     ID:  afebrile     MISC:    SOCIAL/FAMILY:  [x] updated at bedside [] family meeting    CODE STATUS:  [] Full Code [x] DNR [] DNI [] Palliative/Comfort Care    DISPOSITION:  [x] ICU [] Stroke Unit [] Floor [] EMU [] RCU [] PCU    [x] Patient is at high risk of neurologic deterioration/death due to: ICH    Time seen:  Time spent: 35 critical care minutes ASSESSMENT/PLAN: Right thalamic ICH with IVH    NEURO:  NeuroQ2, Vitals q1  r CTH: unchanged   Gabapentin for pain  baclofen for spasticity   Hydrocephalus watch plan for EVD as per neurosgx  Activity: [x] OOB as tolerated [] Bedrest [] PT [] OT [] PMNR    PULM:  plan to transition to RA  sat > 92%    CV:  SBP goal: 110-160  TTE - p   lipitor 10   cardene gtt  plan to wean off  restarting losartan 100 qd      RENAL:  Fluids: 3 % at 50 ccs/hour  BMP q 6 140-150 Na goal     GI:  Diet: NPO excepts meds hydro watch  GI prophylaxis [] not indicated [x] PPI [] other:  Bowel regimen [] colace [x] senna [] other: miralax     ENDO:   Goal euglycemia (-180)  ISS    HEME/ONC:  VTE prophylaxis: [x] SCDs [] chemoprophylaxis [] high risk of DVT/PE on admission due to:  LED 6/5 neg     ID:  afebrile     MISC:    SOCIAL/FAMILY:  [x] updated at bedside [] family meeting    CODE STATUS:  [] Full Code [x] DNR [] DNI [] Palliative/Comfort Care    DISPOSITION:  [x] ICU [] Stroke Unit [] Floor [] EMU [] RCU [] PCU    [x] Patient is at high risk of neurologic deterioration/death due to: ICH    Time seen:  Time spent: 35 critical care minutes

## 2024-06-06 NOTE — DIETITIAN INITIAL EVALUATION ADULT - PERTINENT MEDS FT
MEDICATIONS  (STANDING):  atorvastatin 10 milliGRAM(s) Oral at bedtime  baclofen 10 milliGRAM(s) Oral daily  chlorhexidine 4% Liquid 1 Application(s) Topical daily  gabapentin 300 milliGRAM(s) Oral daily  insulin lispro (ADMELOG) corrective regimen sliding scale   SubCutaneous every 6 hours  losartan 50 milliGRAM(s) Oral daily  niCARdipine Infusion 5 mG/Hr (25 mL/Hr) IV Continuous <Continuous>  pantoprazole    Tablet 40 milliGRAM(s) Oral before breakfast  polyethylene glycol 3350 17 Gram(s) Oral daily  senna 2 Tablet(s) Oral at bedtime  sodium chloride 3%. 500 milliLiter(s) (50 mL/Hr) IV Continuous <Continuous>    MEDICATIONS  (PRN):  acetaminophen   IVPB .. 1000 milliGRAM(s) IV Intermittent every 6 hours PRN Mild Pain (1 - 3)  ondansetron Injectable 4 milliGRAM(s) IV Push every 6 hours PRN Nausea and/or Vomiting

## 2024-06-06 NOTE — DIETITIAN INITIAL EVALUATION ADULT - NSFNSGIIOFT_GEN_A_CORE
-No documented BM's recorded thus far. On bowel regimen (senna, Miralax). Prescribed Protonix.   -Receiving NaCl 3% infusing at 50ml/hr with sodium goal 145-155 determined by neurosurgical team.    -No documented BM's recorded thus far. On bowel regimen (senna, Miralax). Prescribed Protonix.   -Receiving NaCl 3% infusing at 50ml/hr with sodium goal 145-155 determined by neurosurgical team.   -Prescribed insulin lispro sliding scale to aid in management of BG. A1c not readily available.

## 2024-06-06 NOTE — SWALLOW BEDSIDE ASSESSMENT ADULT - ADDITIONAL RECOMMENDATIONS
Maintain good oral hygiene.   This service will continue to follow.   Swallow Goal: 1. Pt will tolerate recommended diet with no overt, clinical s/s of aspiration.

## 2024-06-06 NOTE — SWALLOW BEDSIDE ASSESSMENT ADULT - SLP PERTINENT HISTORY OF CURRENT PROBLEM
83F on ASA, h/o DM, HTN, HLD, chronic LBP, p/f inability to move Rt side at 430AM today. 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 CTA negative. SBP in the 180s. Plts/coags wnl. Exam: EOV, awake, Ox3, brisk FC, Lt side 5/5, RUE 4-/5, RLE 3/5
83F on ASA, h/o DM, HTN, HLD, chronic LBP, p/f inability to move Rt side at 430AM today. 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 CTA negative. SBP in the 180s. Plts/coags wnl. Exam: EOV, awake, Ox3, brisk FC, Lt side 5/5, RUE 4-/5, RLE 3/5

## 2024-06-06 NOTE — PROGRESS NOTE ADULT - SUBJECTIVE AND OBJECTIVE BOX
Patient seen and examined at bedside.    --Anticoagulation--    T(C): 36.6 (06-05-24 @ 23:00), Max: 36.7 (06-05-24 @ 15:00)  HR: 80 (06-06-24 @ 04:30) (58 - 100)  BP: 158/72 (06-06-24 @ 04:30) (107/70 - 204/82)  RR: 16 (06-06-24 @ 04:30) (12 - 26)  SpO2: 96% (06-06-24 @ 04:30) (91% - 100%)  Wt(kg): --    Exam: AOx3, PERRL, EOMI, R facial, mild dysarthria, L side 5/5, R drift/neglect, R side 4+

## 2024-06-06 NOTE — SWALLOW BEDSIDE ASSESSMENT ADULT - SLP GENERAL OBSERVATIONS
Pt seen at bedside, seen with assistance of daughter interpreting as needed for Nia, per Pt preference. Pt awake and alert, grossly oriented x3, verbally responsive, following directions for the purposes of the exam. Pt with dysarthria with reduced articulatory precision, mildly hoarse vocal quality, generally good intelligibility.

## 2024-06-07 LAB
ANION GAP SERPL CALC-SCNC: 12 MMOL/L — SIGNIFICANT CHANGE UP (ref 5–17)
ANION GAP SERPL CALC-SCNC: 16 MMOL/L — SIGNIFICANT CHANGE UP (ref 5–17)
BUN SERPL-MCNC: 16 MG/DL — SIGNIFICANT CHANGE UP (ref 7–23)
BUN SERPL-MCNC: 16 MG/DL — SIGNIFICANT CHANGE UP (ref 7–23)
CALCIUM SERPL-MCNC: 9.6 MG/DL — SIGNIFICANT CHANGE UP (ref 8.4–10.5)
CALCIUM SERPL-MCNC: 9.7 MG/DL — SIGNIFICANT CHANGE UP (ref 8.4–10.5)
CHLORIDE SERPL-SCNC: 114 MMOL/L — HIGH (ref 96–108)
CHLORIDE SERPL-SCNC: 115 MMOL/L — HIGH (ref 96–108)
CO2 SERPL-SCNC: 17 MMOL/L — LOW (ref 22–31)
CO2 SERPL-SCNC: 21 MMOL/L — LOW (ref 22–31)
CREAT SERPL-MCNC: 0.59 MG/DL — SIGNIFICANT CHANGE UP (ref 0.5–1.3)
CREAT SERPL-MCNC: 0.61 MG/DL — SIGNIFICANT CHANGE UP (ref 0.5–1.3)
EGFR: 90 ML/MIN/1.73M2 — SIGNIFICANT CHANGE UP
EGFR: 90 ML/MIN/1.73M2 — SIGNIFICANT CHANGE UP
GLUCOSE BLDC GLUCOMTR-MCNC: 152 MG/DL — HIGH (ref 70–99)
GLUCOSE BLDC GLUCOMTR-MCNC: 171 MG/DL — HIGH (ref 70–99)
GLUCOSE BLDC GLUCOMTR-MCNC: 187 MG/DL — HIGH (ref 70–99)
GLUCOSE BLDC GLUCOMTR-MCNC: 197 MG/DL — HIGH (ref 70–99)
GLUCOSE BLDC GLUCOMTR-MCNC: 208 MG/DL — HIGH (ref 70–99)
GLUCOSE SERPL-MCNC: 192 MG/DL — HIGH (ref 70–99)
GLUCOSE SERPL-MCNC: 225 MG/DL — HIGH (ref 70–99)
MAGNESIUM SERPL-MCNC: 2 MG/DL — SIGNIFICANT CHANGE UP (ref 1.6–2.6)
PHOSPHATE SERPL-MCNC: 2.7 MG/DL — SIGNIFICANT CHANGE UP (ref 2.5–4.5)
PLATELET RESPONSE ASPIRIN RESULT: 521 ARU — SIGNIFICANT CHANGE UP
POTASSIUM SERPL-MCNC: 4.2 MMOL/L — SIGNIFICANT CHANGE UP (ref 3.5–5.3)
POTASSIUM SERPL-MCNC: 5.1 MMOL/L — SIGNIFICANT CHANGE UP (ref 3.5–5.3)
POTASSIUM SERPL-SCNC: 4.2 MMOL/L — SIGNIFICANT CHANGE UP (ref 3.5–5.3)
POTASSIUM SERPL-SCNC: 5.1 MMOL/L — SIGNIFICANT CHANGE UP (ref 3.5–5.3)
SODIUM SERPL-SCNC: 147 MMOL/L — HIGH (ref 135–145)
SODIUM SERPL-SCNC: 148 MMOL/L — HIGH (ref 135–145)

## 2024-06-07 PROCEDURE — 99233 SBSQ HOSP IP/OBS HIGH 50: CPT

## 2024-06-07 PROCEDURE — 70450 CT HEAD/BRAIN W/O DYE: CPT | Mod: 26

## 2024-06-07 PROCEDURE — 71045 X-RAY EXAM CHEST 1 VIEW: CPT | Mod: 26

## 2024-06-07 RX ORDER — TAMSULOSIN HYDROCHLORIDE 0.4 MG/1
0.4 CAPSULE ORAL ONCE
Refills: 0 | Status: COMPLETED | OUTPATIENT
Start: 2024-06-07 | End: 2024-06-07

## 2024-06-07 RX ORDER — LOSARTAN POTASSIUM 100 MG/1
100 TABLET, FILM COATED ORAL DAILY
Refills: 0 | Status: DISCONTINUED | OUTPATIENT
Start: 2024-06-07 | End: 2024-06-13

## 2024-06-07 RX ORDER — LOSARTAN POTASSIUM 100 MG/1
100 TABLET, FILM COATED ORAL DAILY
Refills: 0 | Status: DISCONTINUED | OUTPATIENT
Start: 2024-06-07 | End: 2024-06-07

## 2024-06-07 RX ORDER — SODIUM CHLORIDE 5 G/100ML
1000 INJECTION, SOLUTION INTRAVENOUS
Refills: 0 | Status: DISCONTINUED | OUTPATIENT
Start: 2024-06-07 | End: 2024-06-07

## 2024-06-07 RX ORDER — LABETALOL HCL 100 MG
10 TABLET ORAL ONCE
Refills: 0 | Status: COMPLETED | OUTPATIENT
Start: 2024-06-07 | End: 2024-06-07

## 2024-06-07 RX ORDER — BACLOFEN 100 %
5 POWDER (GRAM) MISCELLANEOUS EVERY 8 HOURS
Refills: 0 | Status: DISCONTINUED | OUTPATIENT
Start: 2024-06-07 | End: 2024-06-13

## 2024-06-07 RX ORDER — LABETALOL HCL 100 MG
100 TABLET ORAL THREE TIMES A DAY
Refills: 0 | Status: DISCONTINUED | OUTPATIENT
Start: 2024-06-07 | End: 2024-06-07

## 2024-06-07 RX ORDER — POTASSIUM CHLORIDE 20 MEQ
20 PACKET (EA) ORAL ONCE
Refills: 0 | Status: COMPLETED | OUTPATIENT
Start: 2024-06-07 | End: 2024-06-07

## 2024-06-07 RX ORDER — ACETAMINOPHEN 500 MG
1000 TABLET ORAL ONCE
Refills: 0 | Status: COMPLETED | OUTPATIENT
Start: 2024-06-07 | End: 2024-06-07

## 2024-06-07 RX ORDER — POTASSIUM CHLORIDE 20 MEQ
20 PACKET (EA) ORAL ONCE
Refills: 0 | Status: DISCONTINUED | OUTPATIENT
Start: 2024-06-07 | End: 2024-06-07

## 2024-06-07 RX ORDER — ACETYLCYSTEINE 200 MG/ML
4 VIAL (ML) MISCELLANEOUS ONCE
Refills: 0 | Status: COMPLETED | OUTPATIENT
Start: 2024-06-07 | End: 2024-06-07

## 2024-06-07 RX ORDER — ACETAMINOPHEN 500 MG
650 TABLET ORAL EVERY 6 HOURS
Refills: 0 | Status: DISCONTINUED | OUTPATIENT
Start: 2024-06-07 | End: 2024-06-13

## 2024-06-07 RX ORDER — INSULIN GLARGINE 100 [IU]/ML
5 INJECTION, SOLUTION SUBCUTANEOUS AT BEDTIME
Refills: 0 | Status: DISCONTINUED | OUTPATIENT
Start: 2024-06-07 | End: 2024-06-13

## 2024-06-07 RX ORDER — HYDRALAZINE HCL 50 MG
10 TABLET ORAL ONCE
Refills: 0 | Status: COMPLETED | OUTPATIENT
Start: 2024-06-07 | End: 2024-06-07

## 2024-06-07 RX ORDER — POTASSIUM PHOSPHATE, MONOBASIC POTASSIUM PHOSPHATE, DIBASIC 236; 224 MG/ML; MG/ML
15 INJECTION, SOLUTION INTRAVENOUS ONCE
Refills: 0 | Status: COMPLETED | OUTPATIENT
Start: 2024-06-07 | End: 2024-06-07

## 2024-06-07 RX ORDER — SODIUM CHLORIDE 9 MG/ML
1000 INJECTION INTRAMUSCULAR; INTRAVENOUS; SUBCUTANEOUS
Refills: 0 | Status: DISCONTINUED | OUTPATIENT
Start: 2024-06-07 | End: 2024-06-09

## 2024-06-07 RX ORDER — IPRATROPIUM/ALBUTEROL SULFATE 18-103MCG
3 AEROSOL WITH ADAPTER (GRAM) INHALATION EVERY 6 HOURS
Refills: 0 | Status: DISCONTINUED | OUTPATIENT
Start: 2024-06-07 | End: 2024-06-07

## 2024-06-07 RX ORDER — HEPARIN SODIUM 5000 [USP'U]/ML
5000 INJECTION INTRAVENOUS; SUBCUTANEOUS EVERY 8 HOURS
Refills: 0 | Status: DISCONTINUED | OUTPATIENT
Start: 2024-06-07 | End: 2024-06-07

## 2024-06-07 RX ORDER — LABETALOL HCL 100 MG
100 TABLET ORAL ONCE
Refills: 0 | Status: COMPLETED | OUTPATIENT
Start: 2024-06-07 | End: 2024-06-07

## 2024-06-07 RX ORDER — INSULIN LISPRO 100/ML
VIAL (ML) SUBCUTANEOUS
Refills: 0 | Status: DISCONTINUED | OUTPATIENT
Start: 2024-06-07 | End: 2024-06-13

## 2024-06-07 RX ORDER — LABETALOL HCL 100 MG
100 TABLET ORAL EVERY 6 HOURS
Refills: 0 | Status: DISCONTINUED | OUTPATIENT
Start: 2024-06-07 | End: 2024-06-10

## 2024-06-07 RX ADMIN — Medication 2: at 05:07

## 2024-06-07 RX ADMIN — SENNA PLUS 2 TABLET(S): 8.6 TABLET ORAL at 22:41

## 2024-06-07 RX ADMIN — INSULIN GLARGINE 5 UNIT(S): 100 INJECTION, SOLUTION SUBCUTANEOUS at 23:11

## 2024-06-07 RX ADMIN — PANTOPRAZOLE SODIUM 40 MILLIGRAM(S): 20 TABLET, DELAYED RELEASE ORAL at 07:48

## 2024-06-07 RX ADMIN — Medication 3 MILLILITER(S): at 08:07

## 2024-06-07 RX ADMIN — TAMSULOSIN HYDROCHLORIDE 0.4 MILLIGRAM(S): 0.4 CAPSULE ORAL at 13:31

## 2024-06-07 RX ADMIN — ATORVASTATIN CALCIUM 10 MILLIGRAM(S): 80 TABLET, FILM COATED ORAL at 22:41

## 2024-06-07 RX ADMIN — SODIUM CHLORIDE 50 MILLILITER(S): 9 INJECTION INTRAMUSCULAR; INTRAVENOUS; SUBCUTANEOUS at 23:08

## 2024-06-07 RX ADMIN — Medication 5 MILLIGRAM(S): at 22:40

## 2024-06-07 RX ADMIN — Medication 20 MILLIEQUIVALENT(S): at 02:19

## 2024-06-07 RX ADMIN — SODIUM CHLORIDE 50 MILLILITER(S): 5 INJECTION, SOLUTION INTRAVENOUS at 17:24

## 2024-06-07 RX ADMIN — Medication 10 MILLIGRAM(S): at 14:22

## 2024-06-07 RX ADMIN — POLYETHYLENE GLYCOL 3350 17 GRAM(S): 17 POWDER, FOR SOLUTION ORAL at 11:35

## 2024-06-07 RX ADMIN — Medication 400 MILLIGRAM(S): at 09:07

## 2024-06-07 RX ADMIN — Medication 100 MILLIGRAM(S): at 15:19

## 2024-06-07 RX ADMIN — Medication 4: at 14:27

## 2024-06-07 RX ADMIN — SODIUM CHLORIDE 50 MILLILITER(S): 5 INJECTION, SOLUTION INTRAVENOUS at 07:48

## 2024-06-07 RX ADMIN — GABAPENTIN 300 MILLIGRAM(S): 400 CAPSULE ORAL at 11:35

## 2024-06-07 RX ADMIN — Medication 1000 MILLIGRAM(S): at 09:22

## 2024-06-07 RX ADMIN — LOSARTAN POTASSIUM 100 MILLIGRAM(S): 100 TABLET, FILM COATED ORAL at 05:10

## 2024-06-07 RX ADMIN — CHLORHEXIDINE GLUCONATE 1 APPLICATION(S): 213 SOLUTION TOPICAL at 22:09

## 2024-06-07 RX ADMIN — Medication 400 MILLIGRAM(S): at 19:53

## 2024-06-07 RX ADMIN — Medication 2: at 22:10

## 2024-06-07 RX ADMIN — Medication 10 MILLIGRAM(S): at 15:19

## 2024-06-07 RX ADMIN — SODIUM CHLORIDE 50 MILLILITER(S): 5 INJECTION, SOLUTION INTRAVENOUS at 19:58

## 2024-06-07 RX ADMIN — Medication 1000 MILLIGRAM(S): at 20:23

## 2024-06-07 RX ADMIN — ONDANSETRON 4 MILLIGRAM(S): 8 TABLET, FILM COATED ORAL at 02:17

## 2024-06-07 RX ADMIN — Medication 100 MILLIGRAM(S): at 20:15

## 2024-06-07 RX ADMIN — POTASSIUM PHOSPHATE, MONOBASIC POTASSIUM PHOSPHATE, DIBASIC 62.5 MILLIMOLE(S): 236; 224 INJECTION, SOLUTION INTRAVENOUS at 02:14

## 2024-06-07 RX ADMIN — Medication 2: at 07:51

## 2024-06-07 RX ADMIN — Medication 4 MILLILITER(S): at 08:01

## 2024-06-07 RX ADMIN — Medication 10 MILLIGRAM(S): at 11:35

## 2024-06-07 NOTE — PROGRESS NOTE ADULT - SUBJECTIVE AND OBJECTIVE BOX
HOSPITAL COURSE: 83F on ASA81, [place] speaking, h/o DM, HTN, HLD, chronic LBP, p/f inability to move Rt side at 430AM today. 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 CTA negative.    24 hour Events:     06/06: NO overnight events    Allergies    penicillin (Unknown)  quinine (Unknown)    Intolerances        REVIEW OF SYSTEMS: [ ] Unable to Assess due to neurologic exam   [ ] All ROS addressed below are non-contributory, except:  Neuro: [ ] Headache [ ] Back pain [ ] Numbness [ ] Weakness [ ] Ataxia [ ] Dizziness [ ] Aphasia [ ] Dysarthria [ ] Visual disturbance  Resp: [ ] Shortness of breath/dyspnea, [ ] Orthopnea [ ] Cough  CV: [ ] Chest pain [ ] Palpitation [ ] Lightheadedness [ ] Syncope  Renal: [ ] Thirst [ ] Edema  GI: [ ] Nausea [ ] Emesis [ ] Abdominal pain [ ] Constipation [ ] Diarrhea  Hem: [ ] Hematemesis [ ] bright red blood per rectum  ID: [ ] Fever [ ] Chills [ ] Dysuria  ENT: [ ] Rhinorrhea      DEVICES:   [ ] Restraints [ ] ET tube [ ] central line [ ] arterial line [ ] gomez [ ] NGT/OGT [ ] EVD [ ] LD [ ] SANDRO/HMV [ ] Trach [ ] PEG [ ] Chest Tube     VITALS:   Vital Signs Last 24 Hrs  T(C): 36.4 (06 Jun 2024 03:00), Max: 36.7 (05 Jun 2024 15:00)  T(F): 97.6 (06 Jun 2024 03:00), Max: 98.1 (05 Jun 2024 15:00)  HR: 77 (06 Jun 2024 06:00) (58 - 100)  BP: 137/62 (06 Jun 2024 06:00) (107/70 - 176/76)  BP(mean): 89 (06 Jun 2024 06:00) (62 - 116)  RR: 18 (06 Jun 2024 06:00) (12 - 26)  SpO2: 99% (06 Jun 2024 06:00) (91% - 100%)    Parameters below as of 05 Jun 2024 19:00  Patient On (Oxygen Delivery Method): room air      CAPILLARY BLOOD GLUCOSE      POCT Blood Glucose.: 123 mg/dL (06 Jun 2024 05:02)  POCT Blood Glucose.: 125 mg/dL (06 Jun 2024 01:15)  POCT Blood Glucose.: 126 mg/dL (05 Jun 2024 16:15)  POCT Blood Glucose.: 193 mg/dL (05 Jun 2024 12:19)    I&O's Summary    05 Jun 2024 07:01  -  06 Jun 2024 06:32  --------------------------------------------------------  IN: 1425 mL / OUT: 2195 mL / NET: -770 mL        Respiratory:        LABS:                        11.4   11.12 )-----------( 301      ( 06 Jun 2024 01:30 )             36.7     06-06    139  |  102  |  13  ----------------------------<  142<H>  4.1   |  22  |  0.63             MEDICATION LEVELS:   Ammonia, Serum: 26 umol/L (06-05 @ 05:52)    IVF FLUIDS/MEDICATIONS:   MEDICATIONS  (STANDING):  atorvastatin 10 milliGRAM(s) Oral at bedtime  baclofen 10 milliGRAM(s) Oral daily  chlorhexidine 4% Liquid 1 Application(s) Topical daily  gabapentin 300 milliGRAM(s) Oral daily  insulin lispro (ADMELOG) corrective regimen sliding scale   SubCutaneous every 6 hours  losartan 50 milliGRAM(s) Oral daily  niCARdipine Infusion 5 mG/Hr (25 mL/Hr) IV Continuous <Continuous>  pantoprazole    Tablet 40 milliGRAM(s) Oral before breakfast  polyethylene glycol 3350 17 Gram(s) Oral daily  senna 2 Tablet(s) Oral at bedtime  sodium chloride 3%. 500 milliLiter(s) (50 mL/Hr) IV Continuous <Continuous>    MEDICATIONS  (PRN):  acetaminophen   IVPB .. 1000 milliGRAM(s) IV Intermittent every 6 hours PRN Mild Pain (1 - 3)  ondansetron Injectable 4 milliGRAM(s) IV Push every 6 hours PRN Nausea and/or Vomiting        IMAGING:      EXAMINATION:  PHYSICAL EXAM:    Constitutional: No Acute Distress     Neurological: Ox2-3, PERRL, right facial, dysarthric. FC, Left side 5/5, RUE 4/5 rue drift, RLE 4/5    Pulmonary: Clear to Auscultation, No rales, No rhonchi, No wheezes     Cardiovascular: S1, S2, Regular rate and rhythm     Gastrointestinal: Soft, Non-tender, Non-distended     Extremities: No calf tenderness    HOSPITAL COURSE: 83F on ASA81, [place] speaking, h/o DM, HTN, HLD, chronic LBP, p/f inability to move Rt side at 430AM today. 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 CTA negative.    24 hour Events:     06/06: NO overnight events    Allergies    penicillin (Unknown)  quinine (Unknown)    Intolerances        REVIEW OF SYSTEMS: [ ] Unable to Assess due to neurologic exam   [X ] All ROS addressed below are non-contributory, except:  Neuro: [ ] Headache [ ] Back pain [ ] Numbness [ ] Weakness [ ] Ataxia [ ] Dizziness [ ] Aphasia [ ] Dysarthria [ ] Visual disturbance  Resp: [ ] Shortness of breath/dyspnea, [ ] Orthopnea [ ] Cough  CV: [ ] Chest pain [ ] Palpitation [ ] Lightheadedness [ ] Syncope  Renal: [ ] Thirst [ ] Edema  GI: [ ] Nausea [ ] Emesis [ ] Abdominal pain [ ] Constipation [ ] Diarrhea  Hem: [ ] Hematemesis [ ] bright red blood per rectum  ID: [ ] Fever [ ] Chills [ ] Dysuria  ENT: [ ] Rhinorrhea      DEVICES:   [ ] Restraints [ ] ET tube [ ] central line [ ] arterial line [ ] gomez [ ] NGT/OGT [ ] EVD [ ] LD [ ] SANDRO/HMV [ ] Trach [ ] PEG [ ] Chest Tube     VITALS:   Vital Signs Last 24 Hrs  T(C): 37.1 (06 Jun 2024 23:00), Max: 37.1 (06 Jun 2024 23:00)  T(F): 98.7 (06 Jun 2024 23:00), Max: 98.7 (06 Jun 2024 23:00)  HR: 92 (07 Jun 2024 01:30) (61 - 105)  BP: 123/58 (07 Jun 2024 01:30) (107/53 - 171/72)  BP(mean): 84 (07 Jun 2024 01:30) (77 - 111)  RR: 20 (07 Jun 2024 01:30) (15 - 28)  SpO2: 97% (07 Jun 2024 01:30) (85% - 100%)    Parameters below as of 06 Jun 2024 19:00  Patient On (Oxygen Delivery Method): nasal cannula  O2 Flow (L/min): 3      CAPILLARY BLOOD GLUCOSE    POCT Blood Glucose.: 179 mg/dL (06 Jun 2024 23:30)  POCT Blood Glucose.: 152 mg/dL (06 Jun 2024 16:47)  POCT Blood Glucose.: 167 mg/dL (06 Jun 2024 10:58)  POCT Blood Glucose.: 123 mg/dL (06 Jun 2024 05:02)      I&O's Summary    05 Jun 2024 07:01  -  06 Jun 2024 07:00  --------------------------------------------------------  IN: 1475 mL / OUT: 2195 mL / NET: -720 mL    06 Jun 2024 07:01  -  07 Jun 2024 01:52  --------------------------------------------------------  IN: 1512.5 mL / OUT: 1000 mL / NET: 512.5 mL      Respiratory:    LABS:  06-06    144  |  110<H>  |  15  ----------------------------<  182<H>  3.9   |  20<L>  |  0.64    Ca    9.1      06 Jun 2024 21:50  Phos  2.6     06-06  Mg     2.2     06-06    TPro  7.5  /  Alb  4.2  /  TBili  0.8  /  DBili  x   /  AST  19  /  ALT  14  /  AlkPhos  63  06-05                        11.4   11.12 )-----------( 301      ( 06 Jun 2024 01:30 )             36.7      MEDICATIONS  (STANDING):  atorvastatin 10 milliGRAM(s) Oral at bedtime  baclofen 10 milliGRAM(s) Oral daily  chlorhexidine 4% Liquid 1 Application(s) Topical daily  gabapentin 300 milliGRAM(s) Oral daily  insulin lispro (ADMELOG) corrective regimen sliding scale   SubCutaneous every 6 hours  losartan 100 milliGRAM(s) Oral daily  niCARdipine Infusion 5 mG/Hr (25 mL/Hr) IV Continuous <Continuous>  pantoprazole    Tablet 40 milliGRAM(s) Oral before breakfast  polyethylene glycol 3350 17 Gram(s) Oral daily  potassium chloride    Tablet ER 20 milliEquivalent(s) Oral once  potassium phosphate IVPB 15 milliMole(s) IV Intermittent once  senna 2 Tablet(s) Oral at bedtime  sodium chloride 3%. 500 milliLiter(s) (50 mL/Hr) IV Continuous <Continuous>    MEDICATIONS  (PRN):  acetaminophen   IVPB .. 1000 milliGRAM(s) IV Intermittent every 6 hours PRN Mild Pain (1 - 3)  ondansetron Injectable 4 milliGRAM(s) IV Push every 6 hours PRN Nausea and/or Vomiting      IMAGING:  < from: [facility] Duplex Lower Ext Vein Scan, Bilat (06.05.24 @ 16:10) >  IMPRESSION:  No evidence of deep venous thrombosis in either lower extremity.    < end of copied text >  < from: CT Head No Cont (06.05.24 @ 12:04) >    IMPRESSION:    No interval change.    --- End of Report ---    < end of copied text >      EXAMINATION:  PHYSICAL EXAM:    Constitutional: No Acute Distress     Neurological: Ox3, PERRL, right facial, dysarthric. FC, Left side 5/5, RUE 3/5 rue drift, RLE 3/5, R-sided neglect    Pulmonary: Clear to Auscultation, No rales, No rhonchi, No wheezes     Cardiovascular: S1, S2, Regular rate and rhythm     Gastrointestinal: Soft, Non-tender, Non-distended     Extremities: No calf tenderness

## 2024-06-07 NOTE — PROGRESS NOTE ADULT - ASSESSMENT
ASSESSMENT/PLAN: Right thalamic ICH with IVH    NEURO:  NeuroQ2, Vitals q1  r CTH: unchanged   Gabapentin for pain  baclofen for spasticity   Hydrocephalus watch plan for EVD as per neurosgx  Activity: [x] OOB as tolerated [] Bedrest [] PT [] OT [] PMNR    PULM:  plan to transition to RA  sat > 92%    CV:  SBP goal: 110-160  TTE - p   lipitor 10   cardene gtt  plan to wean off  restarting losartan 100 qd      RENAL:  Fluids: 3 % at 50 ccs/hour  BMP q 6 140-150 Na goal     GI:  Diet: NPO excepts meds hydro watch  GI prophylaxis [] not indicated [x] PPI [] other:  Bowel regimen [] colace [x] senna [] other: miralax     ENDO:   Goal euglycemia (-180)  ISS    HEME/ONC:  VTE prophylaxis: [x] SCDs [] chemoprophylaxis [] high risk of DVT/PE on admission due to:  LED 6/5 neg     ID:  afebrile     MISC:    SOCIAL/FAMILY:  [x] updated at bedside [] family meeting    CODE STATUS:  [] Full Code [x] DNR [] DNI [] Palliative/Comfort Care    DISPOSITION:  [x] ICU [] Stroke Unit [] Floor [] EMU [] RCU [] PCU    [x] Patient is at high risk of neurologic deterioration/death due to: ICH    Time seen:  Time spent: 35 critical care minutes ASSESSMENT/PLAN: Right thalamic ICH with IVH    NEURO:  NeuroQ2, Vitals q1  CTH 06/05: unchanged   Gabapentin for pain  baclofen for spasticity   Hydrocephalus watch plan for EVD as per neurosgx  Activity: [x] OOB as tolerated [] Bedrest [] PT [] OT [] PMNR    PULM:  plan to transition to RA  sat > 92% on 3L NC    CV:  SBP goal: 110-160  TTE - EF 82%  lipitor 10   cardene gtt  plan to wean off  restarting losartan 100 qd      RENAL:  Fluids: 3 % at 50 ccs/hour  BMP q 6  Na+ 140-150 Na goal     GI:  Diet: NPO excepts meds hydro watch  GI prophylaxis [] not indicated [x] PPI [] other:  Bowel regimen [] colace [x] senna [] other: miralax     ENDO:   Goal euglycemia (-180)  ISS    HEME/ONC:  VTE prophylaxis: [x] SCDs [] chemoprophylaxis [] high risk of DVT/PE on admission due to:  LED 6/5 neg     ID:  afebrile     MISC:    SOCIAL/FAMILY:  [x] updated at bedside [] family meeting    CODE STATUS:  [] Full Code [x] DNR [] DNI [] Palliative/Comfort Care    DISPOSITION:  [x] ICU [] Stroke Unit [] Floor [] EMU [] RCU [] PCU    [x] Patient is at high risk of neurologic deterioration/death due to: ICH    Time seen:  Time spent: 35 critical care minutes ASSESSMENT/PLAN: Pt is a 83 y/o female with a PMH of DM, HTN, HLD, chronic LBP, on ASA81 presented for inability to move R-side, code stroke called, transferred from outside hospital for CTH with L thalamic ICH with IVH on 06/05.     NEURO:  NeuroQ2, Vitals q1  CTH 06/05: unchanged   Gabapentin 300mg QD and Ofirmev 1g q 6 PRN for pain  baclofen for spasticity   Hydrocephalus watch plan for EVD as per neurosgx  Activity: [x] OOB as tolerated [] Bedrest [] PT [] OT [] PMNR    PULM:  plan to transition to RA  sat > 92% on 3L NC    CV:  SBP goal: 100-160  TTE - EF 82%  lipitor 10   cardene gtt  plan to wean off  restarting losartan 100 qd      RENAL:  Fluids: 3 % at 50 ccs/hour  BMP q 8  Na+ 145-155 Na goal     GI:  Diet: Soft and bite sized w/ mildly thick liquids  GI prophylaxis [] not indicated [x] PPI [] other:  Bowel regimen [] colace [x] senna [] other: miralax  Zofran 4q6 PRN for N/V    ENDO:   Goal euglycemia (-180)  ISS    HEME/ONC:  VTE prophylaxis: [x] SCDs [] chemoprophylaxis [] high risk of DVT/PE on admission due to:  LED 6/5 neg, repeat LED 06/12    ID:  afebrile     MISC:    SOCIAL/FAMILY:  [x] updated at bedside [] family meeting    CODE STATUS:  [] Full Code [x] DNR [] DNI [] Palliative/Comfort Care    DISPOSITION:  [x] ICU [] Stroke Unit [] Floor [] EMU [] RCU [] PCU    [x] Patient is at high risk of neurologic deterioration/death due to: ICH    Time seen:  Time spent: 35 critical care minutes

## 2024-06-07 NOTE — PROGRESS NOTE ADULT - ASSESSMENT
ASSESSMENT/PLAN: Pt is a 83 y/o female with a PMH of DM, HTN, HLD, chronic LBP, on ASA81 presented for inability to move R-side, code stroke called, transferred from outside hospital for CTH with L thalamic ICH with IVH on 06/05.     NEURO:  NeuroQ2, Vitals q1  CTH 06/05: unchanged   Gabapentin 300mg QD and Ofirmev 1g q 6 PRN for pain  baclofen for spasticity   Hydrocephalus watch plan for EVD as per neurosgx  Activity: [x] OOB as tolerated [] Bedrest [] PT [] OT [] PMNR    PULM:  plan to transition to RA  sat > 92% on 3L NC    CV:  SBP goal: 100-160  TTE - EF 82%  lipitor 10   cardene gtt  plan to wean off  restarting losartan 100 qd      RENAL:  Fluids: 3 % at 50 ccs/hour  BMP q 8  Na+ 145-155 Na goal     GI:  Diet: Soft and bite sized w/ mildly thick liquids  GI prophylaxis [] not indicated [x] PPI [] other:  Bowel regimen [] colace [x] senna [] other: miralax  Zofran 4q6 PRN for N/V    ENDO:   Goal euglycemia (-180)  ISS    HEME/ONC:  VTE prophylaxis: [x] SCDs [] chemoprophylaxis [] high risk of DVT/PE on admission due to:  LED 6/5 neg, repeat LED 06/12    ID:  afebrile     MISC:    SOCIAL/FAMILY:  [x] updated at bedside [] family meeting    CODE STATUS:  [] Full Code [x] DNR [] DNI [] Palliative/Comfort Care    DISPOSITION:  [x] ICU [] Stroke Unit [] Floor [] EMU [] RCU [] PCU    [x] Patient is at high risk of neurologic deterioration/death due to: ICH    Time seen:  Time spent: 35 critical care minutes ASSESSMENT/PLAN: Pt is a 83 y/o female with a PMH of DM, HTN, HLD, chronic LBP, on ASA81 presented for inability to move R-side, code stroke called, transferred from outside hospital for CTH with L thalamic ICH with IVH on 06/05.     NEURO:  NeuroQ2, Vitals 2  CTH 06/05: unchanged  rCTH 06/07: unchanged    Gabapentin 300mg QD and Ofirmev 1g q 6 PRN for pain  baclofen for spasticity   Plan to transition to stroke unit  Activity: [x] OOB as tolerated [] Bedrest [x] PT [x] OT [] PMNR    PULM:  plan to transition to RA  sat > 92% on 3L NC  Chest PT  IS    CV:  SBP goal: 100-160  TTE - EF 82%  lipitor 10   losartan 100 qd      RENAL:  Fluids: 3 % at 50 ccs/hour  BMP q 8  Na+ 145-155 Na goal     GI:  Diet: Soft and bite sized w/ mildly thick liquids  GI prophylaxis [] not indicated [x] PPI [] other:  Bowel regimen [] colace [x] senna [] other: miralax  Zofran 4q6 PRN for N/V    ENDO:   Goal euglycemia (-180)  ISS    HEME/ONC:  VTE prophylaxis: [x] SCDs [] chemoprophylaxis [] high risk of DVT/PE on admission due to:  LED 6/5 neg, repeat LED 06/12  chemoppc: Heparin 5000 u Q8 if neurosgx agrees  F.U ARU    ID:  afebrile     MISC:    SOCIAL/FAMILY:  [x] updated at bedside [] family meeting    CODE STATUS:  [] Full Code [x] DNR [] DNI [] Palliative/Comfort Care    DISPOSITION:  [x] ICU [] Stroke Unit [] Floor [] EMU [] RCU [] PCU    [x] Patient is at high risk of neurologic deterioration/death due to: ICH    Time seen:  Time spent: 35 critical care minutes

## 2024-06-07 NOTE — PROGRESS NOTE ADULT - SUBJECTIVE AND OBJECTIVE BOX
HOSPITAL COURSE: 83F on ASA81, [place] speaking, h/o DM, HTN, HLD, chronic LBP, p/f inability to move Rt side at 430AM today. 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 CTA negative.    24 hour Events:     06/06: NO overnight events    Allergies    penicillin (Unknown)  quinine (Unknown)    Intolerances        REVIEW OF SYSTEMS: [ ] Unable to Assess due to neurologic exam   [X ] All ROS addressed below are non-contributory, except:  Neuro: [ ] Headache [ ] Back pain [ ] Numbness [ ] Weakness [ ] Ataxia [ ] Dizziness [ ] Aphasia [ ] Dysarthria [ ] Visual disturbance  Resp: [ ] Shortness of breath/dyspnea, [ ] Orthopnea [ ] Cough  CV: [ ] Chest pain [ ] Palpitation [ ] Lightheadedness [ ] Syncope  Renal: [ ] Thirst [ ] Edema  GI: [ ] Nausea [ ] Emesis [ ] Abdominal pain [ ] Constipation [ ] Diarrhea  Hem: [ ] Hematemesis [ ] bright red blood per rectum  ID: [ ] Fever [ ] Chills [ ] Dysuria  ENT: [ ] Rhinorrhea      DEVICES:   [ ] Restraints [ ] ET tube [ ] central line [ ] arterial line [ ] gomez [ ] NGT/OGT [ ] EVD [ ] LD [ ] SANDRO/HMV [ ] Trach [ ] PEG [ ] Chest Tube     VITALS:   Vital Signs Last 24 Hrs  T(C): 37.1 (06 Jun 2024 23:00), Max: 37.1 (06 Jun 2024 23:00)  T(F): 98.7 (06 Jun 2024 23:00), Max: 98.7 (06 Jun 2024 23:00)  HR: 92 (07 Jun 2024 01:30) (61 - 105)  BP: 123/58 (07 Jun 2024 01:30) (107/53 - 171/72)  BP(mean): 84 (07 Jun 2024 01:30) (77 - 111)  RR: 20 (07 Jun 2024 01:30) (15 - 28)  SpO2: 97% (07 Jun 2024 01:30) (85% - 100%)    Parameters below as of 06 Jun 2024 19:00  Patient On (Oxygen Delivery Method): nasal cannula  O2 Flow (L/min): 3      CAPILLARY BLOOD GLUCOSE    POCT Blood Glucose.: 179 mg/dL (06 Jun 2024 23:30)  POCT Blood Glucose.: 152 mg/dL (06 Jun 2024 16:47)  POCT Blood Glucose.: 167 mg/dL (06 Jun 2024 10:58)  POCT Blood Glucose.: 123 mg/dL (06 Jun 2024 05:02)      I&O's Summary    05 Jun 2024 07:01  -  06 Jun 2024 07:00  --------------------------------------------------------  IN: 1475 mL / OUT: 2195 mL / NET: -720 mL    06 Jun 2024 07:01  -  07 Jun 2024 01:52  --------------------------------------------------------  IN: 1512.5 mL / OUT: 1000 mL / NET: 512.5 mL      Respiratory:    LABS:  06-06    144  |  110<H>  |  15  ----------------------------<  182<H>  3.9   |  20<L>  |  0.64    Ca    9.1      06 Jun 2024 21:50  Phos  2.6     06-06  Mg     2.2     06-06    TPro  7.5  /  Alb  4.2  /  TBili  0.8  /  DBili  x   /  AST  19  /  ALT  14  /  AlkPhos  63  06-05                        11.4   11.12 )-----------( 301      ( 06 Jun 2024 01:30 )             36.7      MEDICATIONS  (STANDING):  atorvastatin 10 milliGRAM(s) Oral at bedtime  baclofen 10 milliGRAM(s) Oral daily  chlorhexidine 4% Liquid 1 Application(s) Topical daily  gabapentin 300 milliGRAM(s) Oral daily  insulin lispro (ADMELOG) corrective regimen sliding scale   SubCutaneous every 6 hours  losartan 100 milliGRAM(s) Oral daily  niCARdipine Infusion 5 mG/Hr (25 mL/Hr) IV Continuous <Continuous>  pantoprazole    Tablet 40 milliGRAM(s) Oral before breakfast  polyethylene glycol 3350 17 Gram(s) Oral daily  potassium chloride    Tablet ER 20 milliEquivalent(s) Oral once  potassium phosphate IVPB 15 milliMole(s) IV Intermittent once  senna 2 Tablet(s) Oral at bedtime  sodium chloride 3%. 500 milliLiter(s) (50 mL/Hr) IV Continuous <Continuous>    MEDICATIONS  (PRN):  acetaminophen   IVPB .. 1000 milliGRAM(s) IV Intermittent every 6 hours PRN Mild Pain (1 - 3)  ondansetron Injectable 4 milliGRAM(s) IV Push every 6 hours PRN Nausea and/or Vomiting      IMAGING:  < from: [facility] Duplex Lower Ext Vein Scan, Bilat (06.05.24 @ 16:10) >  IMPRESSION:  No evidence of deep venous thrombosis in either lower extremity.    < end of copied text >  < from: CT Head No Cont (06.05.24 @ 12:04) >    IMPRESSION:    No interval change.    --- End of Report ---    < end of copied text >      EXAMINATION:  PHYSICAL EXAM:    Constitutional: No Acute Distress     Neurological: Ox3, PERRL, right facial, dysarthric. FC, Left side 5/5, RUE 3/5 rue drift, RLE 3/5, R-sided neglect    Pulmonary: Clear to Auscultation, No rales, No rhonchi, No wheezes     Cardiovascular: S1, S2, Regular rate and rhythm     Gastrointestinal: Soft, Non-tender, Non-distended     Extremities: No calf tenderness    HOSPITAL COURSE: 83F on ASA81, [place] speaking, h/o DM, HTN, HLD, chronic LBP, p/f inability to move Rt side at 430AM today. 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 CTA negative.    24 hour Events:     06/06: NO overnight events  06/7: Stroke consult pending if CTH stable plan to transition to stroke     Allergies    penicillin (Unknown)  quinine (Unknown)    Intolerances        REVIEW OF SYSTEMS: [ ] Unable to Assess due to neurologic exam   [X ] All ROS addressed below are non-contributory, except:  Neuro: [ ] Headache [ ] Back pain [ ] Numbness [ ] Weakness [ ] Ataxia [ ] Dizziness [ ] Aphasia [ ] Dysarthria [ ] Visual disturbance  Resp: [ ] Shortness of breath/dyspnea, [ ] Orthopnea [ ] Cough  CV: [ ] Chest pain [ ] Palpitation [ ] Lightheadedness [ ] Syncope  Renal: [ ] Thirst [ ] Edema  GI: [ ] Nausea [ ] Emesis [ ] Abdominal pain [ ] Constipation [ ] Diarrhea  Hem: [ ] Hematemesis [ ] bright red blood per rectum  ID: [ ] Fever [ ] Chills [ ] Dysuria  ENT: [ ] Rhinorrhea      DEVICES:   [ ] Restraints [ ] ET tube [ ] central line [ ] arterial line [ ] gomez [ ] NGT/OGT [ ] EVD [ ] LD [ ] SANDRO/HMV [ ] Trach [ ] PEG [ ] Chest Tube     VITALS:   Vital Signs Last 24 Hrs  T(C): 37.1 (06 Jun 2024 23:00), Max: 37.1 (06 Jun 2024 23:00)  T(F): 98.7 (06 Jun 2024 23:00), Max: 98.7 (06 Jun 2024 23:00)  HR: 92 (07 Jun 2024 01:30) (61 - 105)  BP: 123/58 (07 Jun 2024 01:30) (107/53 - 171/72)  BP(mean): 84 (07 Jun 2024 01:30) (77 - 111)  RR: 20 (07 Jun 2024 01:30) (15 - 28)  SpO2: 97% (07 Jun 2024 01:30) (85% - 100%)    Parameters below as of 06 Jun 2024 19:00  Patient On (Oxygen Delivery Method): nasal cannula  O2 Flow (L/min): 3      CAPILLARY BLOOD GLUCOSE    POCT Blood Glucose.: 179 mg/dL (06 Jun 2024 23:30)  POCT Blood Glucose.: 152 mg/dL (06 Jun 2024 16:47)  POCT Blood Glucose.: 167 mg/dL (06 Jun 2024 10:58)  POCT Blood Glucose.: 123 mg/dL (06 Jun 2024 05:02)      I&O's Summary    05 Jun 2024 07:01  -  06 Jun 2024 07:00  --------------------------------------------------------  IN: 1475 mL / OUT: 2195 mL / NET: -720 mL    06 Jun 2024 07:01  -  07 Jun 2024 01:52  --------------------------------------------------------  IN: 1512.5 mL / OUT: 1000 mL / NET: 512.5 mL                            11.4   11.12 )-----------( 301      ( 06 Jun 2024 01:30 )             36.7   06-07    147<H>  |  114<H>  |  16  ----------------------------<  192<H>  4.2   |  21<L>  |  0.61    Ca    9.6      07 Jun 2024 05:59  Phos  2.6     06-06  Mg     2.2     06-06    TPro  7.5  /  Alb  4.2  /  TBili  0.8  /  DBili  x   /  AST  19  /  ALT  14  /  AlkPhos  63  06-05       MEDICATIONS  (STANDING):  atorvastatin 10 milliGRAM(s) Oral at bedtime  baclofen 10 milliGRAM(s) Oral daily  chlorhexidine 4% Liquid 1 Application(s) Topical daily  gabapentin 300 milliGRAM(s) Oral daily  insulin lispro (ADMELOG) corrective regimen sliding scale   SubCutaneous every 6 hours  losartan 100 milliGRAM(s) Oral daily  niCARdipine Infusion 5 mG/Hr (25 mL/Hr) IV Continuous <Continuous>  pantoprazole    Tablet 40 milliGRAM(s) Oral before breakfast  polyethylene glycol 3350 17 Gram(s) Oral daily  potassium chloride    Tablet ER 20 milliEquivalent(s) Oral once  potassium phosphate IVPB 15 milliMole(s) IV Intermittent once  senna 2 Tablet(s) Oral at bedtime  sodium chloride 3%. 500 milliLiter(s) (50 mL/Hr) IV Continuous <Continuous>    MEDICATIONS  (PRN):  acetaminophen   IVPB .. 1000 milliGRAM(s) IV Intermittent every 6 hours PRN Mild Pain (1 - 3)  ondansetron Injectable 4 milliGRAM(s) IV Push every 6 hours PRN Nausea and/or Vomiting      IMAGING:  < from: [facility] Duplex Lower Ext Vein Scan, Bilat (06.05.24 @ 16:10) >  IMPRESSION:  No evidence of deep venous thrombosis in either lower extremity.    < end of copied text >  < from: CT Head No Cont (06.05.24 @ 12:04) >    IMPRESSION:    No interval change.    --- End of Report ---    < end of copied text >      EXAMINATION:  PHYSICAL EXAM:    Constitutional: No Acute Distress     Neurological: Ox3, PERRL, right facial, dysarthric. FC, Left side 5/5, RUE 3/5 rue drift, RLE 3/5, R-sided neglect    Pulmonary: Clear to Auscultation, No rales, No rhonchi, No wheezes     Cardiovascular: S1, S2, Regular rate and rhythm     Gastrointestinal: Soft, Non-tender, Non-distended     Extremities: No calf tenderness

## 2024-06-07 NOTE — PROGRESS NOTE ADULT - SUBJECTIVE AND OBJECTIVE BOX
Patient seen and examined at bedside.    --Anticoagulation--    T(C): 36.6 (06-05-24 @ 23:00), Max: 36.7 (06-05-24 @ 15:00)  HR: 80 (06-06-24 @ 04:30) (58 - 100)  BP: 158/72 (06-06-24 @ 04:30) (107/70 - 204/82)  RR: 16 (06-06-24 @ 04:30) (12 - 26)  SpO2: 96% (06-06-24 @ 04:30) (91% - 100%)  Wt(kg): --    Exam: AOx3, PERRL, EOMI, R facial, mild dysarthria, L side 5/5, R drift/neglect, R side 4

## 2024-06-07 NOTE — PROGRESS NOTE ADULT - SUBJECTIVE AND OBJECTIVE BOX
*************************************  NEUROLOGY PROGRESS NOTE  **************************************    VIKTORIYA PHILLIPS  Female  MRN-[id number]    Subjective: Patient seen and examined at bedside with Neurology team and attending     Interval History:    patient seen today at bedside     VITAL SIGNS:  Vital Signs Last 24 Hrs  T(C): 36.2 (07 Jun 2024 07:00), Max: 37.1 (06 Jun 2024 23:00)  T(F): 97.2 (07 Jun 2024 07:00), Max: 98.7 (06 Jun 2024 23:00)  HR: 102 (07 Jun 2024 09:00) (66 - 113)  BP: 170/79 (07 Jun 2024 09:00) (107/53 - 171/72)  BP(mean): 113 (07 Jun 2024 09:00) (77 - 113)  RR: 20 (07 Jun 2024 09:00) (15 - 28)  SpO2: 94% (07 Jun 2024 09:00) (91% - 100%)    Parameters below as of 07 Jun 2024 08:08  Patient On (Oxygen Delivery Method): mask, aerosol                PHYSICAL EXAMINATION:  INCOMPLETE  General - NAD  Eyes - Fundoscopy with flat, sharp optic discs and no hemorrhage or exudates; Fundoscopy not well visualized; Fundoscopy not performed due to safety precautions in the setting of the COVID-19 pandemic    Neurologic Exam:  Mental status - Awake, Alert, Oriented to person, place, and time. Speech fluent, repetition and naming intact. Follows simple and complex commands. Attention/concentration, recent and remote memory (including registration and recall), and fund of knowledge intact    Cranial nerves - PERRLA, VFF, EOMI, face sensation (V1-V3) intact b/l, facial strength intact without asymmetry b/l, hearing intact b/l, palate with symmetric elevation, trapezius OR sternocleidomastiod 5/5 strength b/l, tongue midline on protrusion with full lateral movement    Motor - Normal bulk and tone throughout. No pronator drift.  Strength testing            Deltoid      Biceps      Triceps     Wrist Extension    Wrist Flexion     Interossei         R            5                 5               5                     5                              5                        5                 5  L             5                 5               5                     5                              5                        5                 5              Hip Flexion    Hip Extension    Knee Flexion    Knee Extension    Dorsiflexion    Plantar Flexion  R              5                           5                       5                           5                            5                          5  L              5                           5                        5                           5                            5                          5    Sensation - Light touch/temperature OR pain/vibration intact throughout    DTR's -             Biceps      Triceps     Brachioradialis      Patellar    Ankle    Toes/plantar response  R             2+             2+                  2+                       2+            2+                 Down  L              2+             2+                 2+                        2+           2+                 Down    Coordination - Finger to Nose intact b/l. No tremors appreciated    Gait and station - Normal casual gait. Romberg (-)      LABS:    CBC                       11.4   11.12 )-----------( 301      ( 06 Jun 2024 01:30 )             36.7     Chem 06-07    147<H>  |  114<H>  |  16  ----------------------------<  192<H>  4.2   |  21<L>  |  0.61    Ca    9.6      07 Jun 2024 05:59  Phos  2.6     06-06  Mg     2.2     06-06      LFTs   Coagulopathy   Lipid Panel 06-05 Chol 106 LDL -- HDL 50<L> Trig 139  A1c   Cardiac enzymes     U/A Urinalysis Basic - ( 07 Jun 2024 05:59 )    Color: x / Appearance: x / SG: x / pH: x  Gluc: 192 mg/dL / Ketone: x  / Bili: x / Urobili: x   Blood: x / Protein: x / Nitrite: x   Leuk Esterase: x / RBC: x / WBC x   Sq Epi: x / Non Sq Epi: x / Bacteria: x      CSF  Immunological  Other      RADIOLOGY & ADDITIONAL STUDIES:           *************************************  NEUROLOGY PROGRESS NOTE  **************************************    VIKTORIYA PHILLIPS  Female  MRN-[id number]    Subjective: Patient seen and examined at bedside with Neurology team and attending     Interval History:    patient seen today at bedside, no new change in clinical status. Per family she has had an episode of cough and possible aspiration.     VITAL SIGNS:  Vital Signs Last 24 Hrs  T(C): 36.2 (07 Jun 2024 07:00), Max: 37.1 (06 Jun 2024 23:00)  T(F): 97.2 (07 Jun 2024 07:00), Max: 98.7 (06 Jun 2024 23:00)  HR: 102 (07 Jun 2024 09:00) (66 - 113)  BP: 170/79 (07 Jun 2024 09:00) (107/53 - 171/72)  BP(mean): 113 (07 Jun 2024 09:00) (77 - 113)  RR: 20 (07 Jun 2024 09:00) (15 - 28)  SpO2: 94% (07 Jun 2024 09:00) (91% - 100%)    Parameters below as of 07 Jun 2024 08:08  Patient On (Oxygen Delivery Method): mask, aerosol    PHYSICAL EXAMINATION:   General - NAD    Neurologic Exam:  Mental status - Awake, Alert, Oriented to person, place, year, not month. Speech fluent. Follows simple and complex commands.     Cranial nerves - VFF, EOMI, face sensation (V1-V3) intact b/l, facial strength intact without asymmetry b/l    Motor - Normal bulk throughout. Drift (hitting bed) in and RLE. There is drift in RUE but can move well in bed at all joints. No drift in L extremities. Increased tone in RLE. 4-/5 strength in R deltoid, biceps, triceps, hip flexor, ankle dorsi/plantarflexion. 5/5 strength in L deltoid, biceps, triceps, hip flexor, ankle dorsi/plantarflexion. B/l 5/5 strength in hand     Sensation - Light touch intact throughout. Extinction to DSS on R.    DTR's - deferred    Coordination - Finger to Nose intact on L could not assess on right    Gait and station - did not assess due to fall risk and weakness      LABS:    CBC                       11.4   11.12 )-----------( 301      ( 06 Jun 2024 01:30 )             36.7     Chem 06-07    147<H>  |  114<H>  |  16  ----------------------------<  192<H>  4.2   |  21<L>  |  0.61    Ca    9.6      07 Jun 2024 05:59  Phos  2.6     06-06  Mg     2.2     06-06      LFTs   Coagulopathy   Lipid Panel 06-05 Chol 106 LDL -- HDL 50<L> Trig 139  A1c   Cardiac enzymes     U/A Urinalysis Basic - ( 07 Jun 2024 05:59 )    Color: x / Appearance: x / SG: x / pH: x  Gluc: 192 mg/dL / Ketone: x  / Bili: x / Urobili: x   Blood: x / Protein: x / Nitrite: x   Leuk Esterase: x / RBC: x / WBC x   Sq Epi: x / Non Sq Epi: x / Bacteria: x      CSF  Immunological  Other      RADIOLOGY & ADDITIONAL STUDIES:          Radiology:  CT Head No Cont:  (05 Jun 2024 12:04)  FINDINGS:    Stable acute left thalamocapsular/corona radiata hemorrhage with   intraventricular extension within the lateral, third, and fourth   ventricles. Mild prominence of the ventricular system, not significantly   changed. Mild patchy hypodensities within the periventricular and   subcortical white matter, although nonspecific, likely reflect chronic   microvascular disease.      IMPRESSION:    No interval change.

## 2024-06-07 NOTE — PROGRESS NOTE ADULT - ASSESSMENT
ASSESSMENT/PLAN: Pt is a 81 y/o female with a PMH of DM, HTN, HLD, chronic LBP, on ASA81 presented for inability to move R-side, code stroke called, transferred from outside hospital for CTH with L thalamic ICH with IVH on 06/05.     NEURO:  NeuroQ2, Vitals 2  rCTH 06/07: unchanged    baclofen for spasticity   Plan to transition to stroke unit  Activity: [x] OOB as tolerated [] Bedrest [x] PT [x] OT [] PMNR    PULM:  plan to transition to RA  sat > 92% on 3L NC  Chest PT  IS    CV:  SBP goal: 100-160  TTE - EF 82%  lipitor 10   losartan 100 qd      RENAL:  Fluids: NS at 50 ccs/hour  BMP q 8  Na+ 145-155 Na goal     GI:  Diet: Soft and bite sized w/ mildly thick liquids  GI prophylaxis [] not indicated [x] PPI [] other:  Bowel regimen [] colace [x] senna [] other: miralax  Zofran 4q6 PRN for N/V    ENDO:   Goal euglycemia (-180)  Add lantus 5 units QHS  HbA1c pending  ISS    HEME/ONC:  VTE prophylaxis: [x] SCDs [] chemoprophylaxis [] high risk of DVT/PE on admission due to:  LED 6/5 neg, repeat LED 06/12  chemoppc: Heparin 5000 u Q8 if neurosgx agrees  F.U ARU    ID:  afebrile

## 2024-06-07 NOTE — PROGRESS NOTE ADULT - ASSESSMENT
83y F with Hx HTN, HLD, DM, spinal stenosis, who presents w/ CC of abnormal CTH    LKW: 4am 6/5  NIHSS: 7  MRS: 1    Impression: R-sided weakness, drift, and Babinski, c/w L thalamic hemorrhage with intraventricular hemorrhage seen on CTH. Stable interval scan. Possible etiology is hypertensive, but would proceed w/ appropriate stroke workup    Recommendations:  [] MR brain w/ contrast when able  [] hold AC/AP  [] goal BP <140/90  [] frequent neuro checks q 2 hours  [] check A1c and lipid panel  [x] TTE  [] PT evaluation when able    Case discussed w/ stroke fellow Arsh Ardon

## 2024-06-07 NOTE — PROGRESS NOTE ADULT - SUBJECTIVE AND OBJECTIVE BOX
HOSPITAL COURSE: 83F on ASA81, Gu[place] speaking, h/o DM, HTN, HLD, chronic LBP, p/f inability to move Rt side at 430AM today. 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 CTA negative.    24 hour Events:     06/06: NO overnight events  06/7: Plan to transition to stroke     Allergies    penicillin (Unknown)  quinine (Unknown)    Intolerances        REVIEW OF SYSTEMS: [ ] Unable to Assess due to neurologic exam   [X] All ROS addressed below are non-contributory, except:  Neuro: [ ] Headache [ ] Back pain [ ] Numbness [ ] Weakness [ ] Ataxia [ ] Dizziness [ ] Aphasia [ ] Dysarthria [ ] Visual disturbance  Resp: [ ] Shortness of breath/dyspnea, [ ] Orthopnea [ ] Cough  CV: [ ] Chest pain [ ] Palpitation [ ] Lightheadedness [ ] Syncope  Renal: [ ] Thirst [ ] Edema  GI: [ ] Nausea [ ] Emesis [ ] Abdominal pain [ ] Constipation [ ] Diarrhea  Hem: [ ] Hematemesis [ ] bright red blood per rectum  ID: [ ] Fever [ ] Chills [ ] Dysuria  ENT: [ ] Rhinorrhea      DEVICES:   [ ] Restraints [ ] ET tube [ ] central line [ ] arterial line [ ] gomez [ ] NGT/OGT [ ] EVD [ ] LD [ ] SANDRO/HMV [ ] Trach [ ] PEG [ ] Chest Tube     VITALS:   Vital Signs Last 24 Hrs  T(C): 37.1 (06-07-24 @ 19:00), Max: 37.1 (06-06-24 @ 23:00)  HR: 75 (06-07-24 @ 20:00) (59 - 113)  BP: 173/74 (06-07-24 @ 20:00) (117/57 - 185/83)  BP(mean): 107 (06-07-24 @ 20:00) (82 - 119)  RR: 17 (06-07-24 @ 20:00) (14 - 20)  SpO2: 95% (06-07-24 @ 20:00) (92% - 100%)    acetaminophen     Tablet .. 650 milliGRAM(s) Oral every 6 hours PRN  atorvastatin 10 milliGRAM(s) Oral at bedtime  baclofen 5 milliGRAM(s) Oral every 8 hours  chlorhexidine 4% Liquid 1 Application(s) Topical daily  gabapentin 300 milliGRAM(s) Oral daily  insulin lispro (ADMELOG) corrective regimen sliding scale   SubCutaneous Before meals and at bedtime  labetalol 100 milliGRAM(s) Oral every 6 hours  losartan 100 milliGRAM(s) Oral daily  ondansetron Injectable 4 milliGRAM(s) IV Push every 6 hours PRN  pantoprazole    Tablet 40 milliGRAM(s) Oral before breakfast  polyethylene glycol 3350 17 Gram(s) Oral daily  senna 2 Tablet(s) Oral at bedtime  sodium chloride 0.9%. 1000 milliLiter(s) IV Continuous <Continuous>        06-06-24 @ 07:01  -  06-07-24 @ 07:00  --------------------------------------------------------  IN: 1812.5 mL / OUT: 2700 mL / NET: -887.5 mL    06-07-24 @ 07:01  -  06-07-24 @ 22:40  --------------------------------------------------------  IN: 200 mL / OUT: 1350 mL / NET: -1150 mL                          11.4   11.12 )-----------( 301      ( 06 Jun 2024 01:30 )             36.7   06-07    148<H>  |  115<H>  |  16  ----------------------------<  225<H>  5.1   |  17<L>  |  0.59        EXAMINATION:  PHYSICAL EXAM:    Constitutional: No Acute Distress     Neurological: Ox3, PERRL, right facial, dysarthric. FC, Left side 5/5, RUE 3/5 rue drift, RLE 3/5, R-sided neglect    Pulmonary: Clear to Auscultation, No rales, No rhonchi, No wheezes     Cardiovascular: S1, S2, Regular rate and rhythm     Gastrointestinal: Soft, Non-tender, Non-distended     Extremities: No calf tenderness

## 2024-06-08 LAB
A1C WITH ESTIMATED AVERAGE GLUCOSE RESULT: 7.5 % — HIGH (ref 4–5.6)
APPEARANCE UR: ABNORMAL
BACTERIA # UR AUTO: NEGATIVE /HPF — SIGNIFICANT CHANGE UP
BILIRUB UR-MCNC: NEGATIVE — SIGNIFICANT CHANGE UP
CAST: 4 /LPF — SIGNIFICANT CHANGE UP (ref 0–4)
CHOLEST SERPL-MCNC: 111 MG/DL — SIGNIFICANT CHANGE UP
COLOR SPEC: YELLOW — SIGNIFICANT CHANGE UP
DIFF PNL FLD: ABNORMAL
ESTIMATED AVERAGE GLUCOSE: 169 MG/DL — HIGH (ref 68–114)
GLUCOSE BLDC GLUCOMTR-MCNC: 140 MG/DL — HIGH (ref 70–99)
GLUCOSE BLDC GLUCOMTR-MCNC: 161 MG/DL — HIGH (ref 70–99)
GLUCOSE BLDC GLUCOMTR-MCNC: 179 MG/DL — HIGH (ref 70–99)
GLUCOSE BLDC GLUCOMTR-MCNC: 209 MG/DL — HIGH (ref 70–99)
GLUCOSE UR QL: NEGATIVE MG/DL — SIGNIFICANT CHANGE UP
HDLC SERPL-MCNC: 57 MG/DL — SIGNIFICANT CHANGE UP
KETONES UR-MCNC: NEGATIVE MG/DL — SIGNIFICANT CHANGE UP
LEUKOCYTE ESTERASE UR-ACNC: ABNORMAL
LIPID PNL WITH DIRECT LDL SERPL: 33 MG/DL — SIGNIFICANT CHANGE UP
NITRITE UR-MCNC: NEGATIVE — SIGNIFICANT CHANGE UP
NON HDL CHOLESTEROL: 54 MG/DL — SIGNIFICANT CHANGE UP
PH UR: 5.5 — SIGNIFICANT CHANGE UP (ref 5–8)
PROT UR-MCNC: 30 MG/DL
RBC CASTS # UR COMP ASSIST: 139 /HPF — HIGH (ref 0–4)
REVIEW: SIGNIFICANT CHANGE UP
SP GR SPEC: 1.02 — SIGNIFICANT CHANGE UP (ref 1–1.03)
SQUAMOUS # UR AUTO: 0 /HPF — SIGNIFICANT CHANGE UP (ref 0–5)
TRIGL SERPL-MCNC: 112 MG/DL — SIGNIFICANT CHANGE UP
TSH SERPL-MCNC: 0.93 UIU/ML — SIGNIFICANT CHANGE UP (ref 0.27–4.2)
UROBILINOGEN FLD QL: 0.2 MG/DL — SIGNIFICANT CHANGE UP (ref 0.2–1)
WBC UR QL: 263 /HPF — HIGH (ref 0–5)

## 2024-06-08 PROCEDURE — 99233 SBSQ HOSP IP/OBS HIGH 50: CPT

## 2024-06-08 RX ORDER — CEFTRIAXONE 500 MG/1
1000 INJECTION, POWDER, FOR SOLUTION INTRAMUSCULAR; INTRAVENOUS EVERY 24 HOURS
Refills: 0 | Status: DISCONTINUED | OUTPATIENT
Start: 2024-06-08 | End: 2024-06-08

## 2024-06-08 RX ORDER — ENOXAPARIN SODIUM 100 MG/ML
40 INJECTION SUBCUTANEOUS EVERY 24 HOURS
Refills: 0 | Status: DISCONTINUED | OUTPATIENT
Start: 2024-06-08 | End: 2024-06-13

## 2024-06-08 RX ORDER — PHENAZOPYRIDINE HCL 100 MG
100 TABLET ORAL EVERY 8 HOURS
Refills: 0 | Status: DISCONTINUED | OUTPATIENT
Start: 2024-06-08 | End: 2024-06-13

## 2024-06-08 RX ORDER — HYDRALAZINE HCL 50 MG
5 TABLET ORAL ONCE
Refills: 0 | Status: COMPLETED | OUTPATIENT
Start: 2024-06-08 | End: 2024-06-08

## 2024-06-08 RX ORDER — PANTOPRAZOLE SODIUM 20 MG/1
40 TABLET, DELAYED RELEASE ORAL DAILY
Refills: 0 | Status: DISCONTINUED | OUTPATIENT
Start: 2024-06-08 | End: 2024-06-13

## 2024-06-08 RX ORDER — HYDRALAZINE HCL 50 MG
10 TABLET ORAL ONCE
Refills: 0 | Status: COMPLETED | OUTPATIENT
Start: 2024-06-08 | End: 2024-06-08

## 2024-06-08 RX ADMIN — SODIUM CHLORIDE 50 MILLILITER(S): 9 INJECTION INTRAMUSCULAR; INTRAVENOUS; SUBCUTANEOUS at 07:38

## 2024-06-08 RX ADMIN — Medication 100 MILLIGRAM(S): at 11:46

## 2024-06-08 RX ADMIN — Medication 4: at 15:59

## 2024-06-08 RX ADMIN — ATORVASTATIN CALCIUM 10 MILLIGRAM(S): 80 TABLET, FILM COATED ORAL at 22:34

## 2024-06-08 RX ADMIN — Medication 100 MILLIGRAM(S): at 16:19

## 2024-06-08 RX ADMIN — SENNA PLUS 2 TABLET(S): 8.6 TABLET ORAL at 22:34

## 2024-06-08 RX ADMIN — Medication 5 MILLIGRAM(S): at 05:31

## 2024-06-08 RX ADMIN — INSULIN GLARGINE 5 UNIT(S): 100 INJECTION, SOLUTION SUBCUTANEOUS at 22:35

## 2024-06-08 RX ADMIN — CHLORHEXIDINE GLUCONATE 1 APPLICATION(S): 213 SOLUTION TOPICAL at 22:39

## 2024-06-08 RX ADMIN — SODIUM CHLORIDE 50 MILLILITER(S): 9 INJECTION INTRAMUSCULAR; INTRAVENOUS; SUBCUTANEOUS at 15:59

## 2024-06-08 RX ADMIN — Medication 100 MILLIGRAM(S): at 07:42

## 2024-06-08 RX ADMIN — Medication 100 MILLIGRAM(S): at 02:57

## 2024-06-08 RX ADMIN — Medication 650 MILLIGRAM(S): at 17:38

## 2024-06-08 RX ADMIN — Medication 5 MILLIGRAM(S): at 22:34

## 2024-06-08 RX ADMIN — Medication 2: at 11:29

## 2024-06-08 RX ADMIN — PANTOPRAZOLE SODIUM 40 MILLIGRAM(S): 20 TABLET, DELAYED RELEASE ORAL at 11:47

## 2024-06-08 RX ADMIN — Medication 2: at 07:38

## 2024-06-08 RX ADMIN — Medication 5 MILLIGRAM(S): at 22:35

## 2024-06-08 RX ADMIN — Medication 1 TABLET(S): at 06:35

## 2024-06-08 RX ADMIN — Medication 100 MILLIGRAM(S): at 01:41

## 2024-06-08 RX ADMIN — ENOXAPARIN SODIUM 40 MILLIGRAM(S): 100 INJECTION SUBCUTANEOUS at 13:29

## 2024-06-08 RX ADMIN — GABAPENTIN 300 MILLIGRAM(S): 400 CAPSULE ORAL at 11:46

## 2024-06-08 RX ADMIN — Medication 5 MILLIGRAM(S): at 13:28

## 2024-06-08 RX ADMIN — Medication 650 MILLIGRAM(S): at 15:59

## 2024-06-08 RX ADMIN — Medication 1 TABLET(S): at 17:38

## 2024-06-08 RX ADMIN — POLYETHYLENE GLYCOL 3350 17 GRAM(S): 17 POWDER, FOR SOLUTION ORAL at 11:47

## 2024-06-08 RX ADMIN — Medication 10 MILLIGRAM(S): at 09:37

## 2024-06-08 RX ADMIN — LOSARTAN POTASSIUM 100 MILLIGRAM(S): 100 TABLET, FILM COATED ORAL at 05:32

## 2024-06-08 NOTE — PROGRESS NOTE ADULT - ASSESSMENT
ASSESSMENT/PLAN: Pt is a 81 y/o female with a PMH of DM, HTN, HLD, chronic LBP, on ASA81 presented for inability to move R-side, code stroke called, transferred from outside hospital for CTH with L thalamic ICH with IVH on 06/05.     NEURO:  NeuroQ2, Vitals 2  CTH 06/05: unchanged  rCTH 06/07: unchanged    Gabapentin 300mg QD and Ofirmev 1g q 6 PRN for pain  baclofen for spasticity   Plan to transition to stroke unit  Activity: [x] OOB as tolerated [] Bedrest [x] PT [x] OT [] PMNR    PULM:  plan to transition to RA  sat > 92% on 3L NC  Chest PT  IS    CV:  SBP goal: 100-160  TTE - EF 82%  lipitor 10   losartan 100 qd      RENAL:  Fluids: 3 % at 50 ccs/hour  BMP q 8  Na+ 145-155 Na goal     GI:  Diet: Soft and bite sized w/ mildly thick liquids  GI prophylaxis [] not indicated [x] PPI [] other:  Bowel regimen [] colace [x] senna [] other: miralax  Zofran 4q6 PRN for N/V    ENDO:   Goal euglycemia (-180)  ISS    HEME/ONC:  VTE prophylaxis: [x] SCDs [] chemoprophylaxis [] high risk of DVT/PE on admission due to:  LED 6/5 neg, repeat LED 06/12  chemoppc: Heparin 5000 u Q8 if neurosgx agrees  F.U ARU    ID:  afebrile     MISC:    SOCIAL/FAMILY:  [x] updated at bedside [] family meeting    CODE STATUS:  [] Full Code [x] DNR [] DNI [] Palliative/Comfort Care    DISPOSITION:  [x] ICU [] Stroke Unit [] Floor [] EMU [] RCU [] PCU    [x] Patient is at high risk of neurologic deterioration/death due to: ICH    Time seen:  Time spent: 35 critical care minutes ASSESSMENT/PLAN: Pt is a 83 y/o female with a PMH of DM, HTN, HLD, chronic LBP, on ASA81 presented for inability to move R-side, code stroke called, transferred from outside hospital for CTH with L thalamic ICH with IVH on 06/05.     NEURO:  NeuroQ2, Vitals 2  CTH 06/05: unchanged  rCTH 06/07: unchanged    Gabapentin 300mg QD and Ofirmev 1g q 6 PRN for pain  baclofen for spasticity   Plan to transition to stroke unit  Activity: [x] OOB as tolerated [] Bedrest [x] PT [x] OT [] PMNR    PULM:  plan to transition to RA  sat > 92% on 3L NC  Chest PT  IS    CV:  SBP goal: 100-160  TTE - EF 82%  lipitor 10   losartan 100 qd      RENAL:  Fluids: 3 % at 50 ccs/hour  BMP q 8  Na+ 145-155 Na goal     GI:  Diet: Soft and bite sized w/ mildly thick liquids  GI prophylaxis [] not indicated [x] PPI [] other:  Bowel regimen [] colace [x] senna [] other: miralax  Zofran 4q6 PRN for N/V    ENDO:   Goal euglycemia (-180)  ISS    HEME/ONC:  VTE prophylaxis: [x] SCDs [] chemoprophylaxis [] high risk of DVT/PE on admission due to:  LED 6/5 neg, repeat LED 06/12  chemoppc: Heparin 5000 u Q8 if neurosgx agrees  F.U ARU    ID:  Bactrim for UA consistent with UTI along with dysuria   F/u UCx    MISC:    SOCIAL/FAMILY:  [x] updated at bedside [] family meeting    CODE STATUS:  [] Full Code [x] DNR [] DNI [] Palliative/Comfort Care    DISPOSITION:  [x] ICU [] Stroke Unit [] Floor [] EMU [] RCU [] PCU    [x] Patient is at high risk of neurologic deterioration/death due to: ICH    Time seen:  Time spent: 35 critical care minutes

## 2024-06-08 NOTE — PROGRESS NOTE ADULT - ATTENDING COMMENTS
remains on hydro watch.  no EVD as of yet.  NSG following.  will remain in NSCU in the interim.
still without neurosurgical intervention.  stroke neurology following.  OK to transfer to stroke service when bed available.
stroke neurology still following, saw today @ bedside.  likely will get bed in stroke unit this afternoon.

## 2024-06-08 NOTE — PROGRESS NOTE ADULT - SUBJECTIVE AND OBJECTIVE BOX
Progress Note    SUBJECTIVE: No events overnight.     acetaminophen     Tablet .. 650 milliGRAM(s) Oral every 6 hours PRN  atorvastatin 10 milliGRAM(s) Oral at bedtime  baclofen 5 milliGRAM(s) Oral every 8 hours  chlorhexidine 4% Liquid 1 Application(s) Topical daily  enoxaparin Injectable 40 milliGRAM(s) SubCutaneous every 24 hours  gabapentin 300 milliGRAM(s) Oral daily  insulin glargine Injectable (LANTUS) 5 Unit(s) SubCutaneous at bedtime  insulin lispro (ADMELOG) corrective regimen sliding scale   SubCutaneous Before meals and at bedtime  labetalol 100 milliGRAM(s) Oral every 6 hours  losartan 100 milliGRAM(s) Oral daily  ondansetron Injectable 4 milliGRAM(s) IV Push every 6 hours PRN  pantoprazole  Injectable 40 milliGRAM(s) IV Push daily  phenazopyridine 100 milliGRAM(s) Oral every 8 hours PRN  polyethylene glycol 3350 17 Gram(s) Oral daily  senna 2 Tablet(s) Oral at bedtime  sodium chloride 0.9%. 1000 milliLiter(s) IV Continuous <Continuous>  trimethoprim  160 mG/sulfamethoxazole 800 mG 1 Tablet(s) Oral two times a day      PHYSICAL EXAM:   Vital Signs Last 24 Hrs  T(C): 36.9 (08 Jun 2024 11:00), Max: 37.1 (07 Jun 2024 19:00)  T(F): 98.5 (08 Jun 2024 11:00), Max: 98.8 (08 Jun 2024 07:00)  HR: 64 (08 Jun 2024 11:00) (55 - 76)  BP: 160/69 (08 Jun 2024 11:00) (131/60 - 186/81)  BP(mean): 99 (08 Jun 2024 11:00) (86 - 116)  RR: 20 (08 Jun 2024 11:00) (14 - 20)  SpO2: 100% (08 Jun 2024 11:00) (93% - 100%)    Parameters below as of 08 Jun 2024 07:00  Patient On (Oxygen Delivery Method): nasal cannula  O2 Flow (L/min): 2      General: No acute distress  Mental status: Awake, knows that she is in the hospital, thinks that she's 71yo; follows complex but not multistep commands, reduced speech output  Cranial Nerves:  Right facial asymmetry, + dysarthria  Motor exam: RUE - Delt 3/5, rest in the 4/5; RLE - in the range of 4/5 with a drift. Left side without drift.  Sensation: Intact to light touch       LABS:   06-07    148<H>  |  115<H>  |  16  ----------------------------<  225<H>  5.1   |  17<L>  |  0.59    Ca    9.7      07 Jun 2024 14:12  Phos  2.7     06-07  Mg     2.0     06-07          IMAGING: Reviewed by me.

## 2024-06-08 NOTE — PROGRESS NOTE ADULT - ASSESSMENT
83y F with Hx HTN, HLD, DM, spinal stenosis, who presents with left thal IPH with extensive IVH. NIHSS of 7. ICH of 2. Monitored closely in NeuroICU with serial stable CTHs. CTA head is negative for vascular malformations.     NIHSS: 7  pre-MRS: 1    Impression: R-sided hemiparesis, c/w L thalamic IPH with IVH. The most likely etiology is hypertensive, but would proceed w/ appropriate stroke workup    Recommendations:    [] Accepted to Stroke Unit, neurochecks q2 hours  [] MR brain w/ contrast pending   [] BP <140/90  [x] TTE with normal EF, no atrial or valvular pathology    [x] SLP: passed   [x] DVT ppx started (enoxaparin)  [x] PT/OT - Rehab    Robert Proctor MD  Stroke Fellow        83y F with Hx HTN, HLD, DM, spinal stenosis, who presents with left thal IPH with extensive IVH. NIHSS of 7. ICH of 2. Monitored closely in NeuroICU with serial stable CTHs. CTA head is negative for vascular malformations.     NIHSS: 7  pre-MRS: 1    Impression: R-sided hemiparesis, c/w L thalamic IPH with IVH. The most likely etiology is hypertensive, but would proceed with MR brain IPH protocol.    Recommendations:    [] Accepted to Stroke Unit, neurochecks q2 hours  [] MR brain w/ contrast pending   [] BP <140/90  [x] TTE with normal EF, no atrial or valvular pathology    [x] SLP: passed   [x] DVT ppx started (enoxaparin)  [x] PT/OT - Rehab    Robert Proctor MD  Stroke Fellow

## 2024-06-08 NOTE — CHART NOTE - NSCHARTNOTEFT_GEN_A_CORE
83F on ASA81, Gujarati speaking, h/o DM, HTN, HLD, chronic LBP, p/f inability to move Rt side at 430AM today. 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 NIHSS of 7. ICH of 2. pre-MRS 1. CTA head negative for vascular malformations. 06/7: Stroke consult pending if CTH stable plan to transition to stroke. 06/8: Patient was started on Bactrim for a UTI, pending UCx. Per Neuro: Impression: R-sided hemiparesis, c/w L thalamic IPH with IVH. The most likely etiology is hypertensive, but would proceed w/ stroke w/u    Today, Pt seen at bedside with family present for repeat evaluation of swallow function. Pt awake and alert, verbally responsive and following simple directions for the purposes of the exam. Pt [id number] However, Pt is hard of hearing, had difficulty with speaker phone, and stated that she preferred interpretation from family at the bedside. Per chart and discussion with team and family, Pt had an episode of coughing when eating, and has demonstrated overall poor PO intake. Upon further discussion and queries to family, it appears that the episode of difficulty was noted with chewable foods of Soft and Bite-sized texture, but not with mildly thick liquids. They again endorse that Pt was observed to cough when drinking thin liquids prior to reason for this admission. Given current concern re chewable foods, Pt was administered PO trials of only puree, mildly thick, and moderately thick liquids via cup, spoon and straw. Pt continues to present with an oropharyngeal swallow notable for prolonged oral management, suspected delay in pharyngeal swallow trigger, palpable hyolaryngeal elevation/excursion, and no overt s/s laryngeal penetration/aspiration with the currently trialed textures. Pt would benefit from instrumental exam for more comprehensive evaluation to determine most appropriate oral diet. Purposeful proactive rounding reinforced and 5 Ps addressed. Pt left in no distress.     IMPRESSIONS:   Pt presents with evidence of an oropharyngeal dysphagia with reported coughing episode with soft and bite-sized chewable foods reported by staff and family, and prolonged oral management and suspected delay in swallow trigger. Pt would benefit from instrumental exam for more comprehensive evaluation of swallow function/safety. Given Pt bed-boarded for stroke unit, would recommend MBS. Findings and recommendations d/w Linda Mccrary, and Pt's family at bedside.    RECOMMENDATIONS:   Please downgrade to Puree with Mildly thick liquids  Strict aspiration and reflux precautions!   Maintain good oral hygiene.   Monitor for s/s aspiration/laryngeal penetration. If noted:  D/C p.o. intake, provide non-oral nutrition/hydration/meds, and contact this service @ [phone]   MBS scheduled for Monday, as per d/w Linda Mccrary, and Pt's family at bedside.  This service will continue to follow.  Acute rehab on d/c.      Kinga Babin MA, CCC-SLP  Speech-Language Pathologist  office [phone]  TEAMS preferred

## 2024-06-08 NOTE — PROGRESS NOTE ADULT - SUBJECTIVE AND OBJECTIVE BOX
HOSPITAL COURSE: 83F on ASA81, Gujurati speaking, h/o DM, HTN, HLD, chronic LBP, p/f inability to move Rt side at 430AM today. 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 CTA negative.    24 hour Events:     06/06: NO overnight events  06/7: Stroke consult pending if CTH stable plan to transition to stroke     Allergies    penicillin (Unknown)  quinine (Unknown)    Intolerances        REVIEW OF SYSTEMS: [ ] Unable to Assess due to neurologic exam   [X ] All ROS addressed below are non-contributory, except:  Neuro: [ ] Headache [ ] Back pain [ ] Numbness [ ] Weakness [ ] Ataxia [ ] Dizziness [ ] Aphasia [ ] Dysarthria [ ] Visual disturbance  Resp: [ ] Shortness of breath/dyspnea, [ ] Orthopnea [ ] Cough  CV: [ ] Chest pain [ ] Palpitation [ ] Lightheadedness [ ] Syncope  Renal: [ ] Thirst [ ] Edema  GI: [ ] Nausea [ ] Emesis [ ] Abdominal pain [ ] Constipation [ ] Diarrhea  Hem: [ ] Hematemesis [ ] bright red blood per rectum  ID: [ ] Fever [ ] Chills [ ] Dysuria  ENT: [ ] Rhinorrhea      DEVICES:   [ ] Restraints [ ] ET tube [ ] central line [ ] arterial line [ ] gomez [ ] NGT/OGT [ ] EVD [ ] LD [ ] SANDRO/HMV [ ] Trach [ ] PEG [ ] Chest Tube     VITALS:   Vital Signs Last 24 Hrs  T(C): 37.1 (06 Jun 2024 23:00), Max: 37.1 (06 Jun 2024 23:00)  T(F): 98.7 (06 Jun 2024 23:00), Max: 98.7 (06 Jun 2024 23:00)  HR: 92 (07 Jun 2024 01:30) (61 - 105)  BP: 123/58 (07 Jun 2024 01:30) (107/53 - 171/72)  BP(mean): 84 (07 Jun 2024 01:30) (77 - 111)  RR: 20 (07 Jun 2024 01:30) (15 - 28)  SpO2: 97% (07 Jun 2024 01:30) (85% - 100%)    Parameters below as of 06 Jun 2024 19:00  Patient On (Oxygen Delivery Method): nasal cannula  O2 Flow (L/min): 3      CAPILLARY BLOOD GLUCOSE    POCT Blood Glucose.: 179 mg/dL (06 Jun 2024 23:30)  POCT Blood Glucose.: 152 mg/dL (06 Jun 2024 16:47)  POCT Blood Glucose.: 167 mg/dL (06 Jun 2024 10:58)  POCT Blood Glucose.: 123 mg/dL (06 Jun 2024 05:02)      I&O's Summary    05 Jun 2024 07:01  -  06 Jun 2024 07:00  --------------------------------------------------------  IN: 1475 mL / OUT: 2195 mL / NET: -720 mL    06 Jun 2024 07:01  -  07 Jun 2024 01:52  --------------------------------------------------------  IN: 1512.5 mL / OUT: 1000 mL / NET: 512.5 mL                            11.4   11.12 )-----------( 301      ( 06 Jun 2024 01:30 )             36.7   06-07    147<H>  |  114<H>  |  16  ----------------------------<  192<H>  4.2   |  21<L>  |  0.61    Ca    9.6      07 Jun 2024 05:59  Phos  2.6     06-06  Mg     2.2     06-06    TPro  7.5  /  Alb  4.2  /  TBili  0.8  /  DBili  x   /  AST  19  /  ALT  14  /  AlkPhos  63  06-05       MEDICATIONS  (STANDING):  atorvastatin 10 milliGRAM(s) Oral at bedtime  baclofen 10 milliGRAM(s) Oral daily  chlorhexidine 4% Liquid 1 Application(s) Topical daily  gabapentin 300 milliGRAM(s) Oral daily  insulin lispro (ADMELOG) corrective regimen sliding scale   SubCutaneous every 6 hours  losartan 100 milliGRAM(s) Oral daily  niCARdipine Infusion 5 mG/Hr (25 mL/Hr) IV Continuous <Continuous>  pantoprazole    Tablet 40 milliGRAM(s) Oral before breakfast  polyethylene glycol 3350 17 Gram(s) Oral daily  potassium chloride    Tablet ER 20 milliEquivalent(s) Oral once  potassium phosphate IVPB 15 milliMole(s) IV Intermittent once  senna 2 Tablet(s) Oral at bedtime  sodium chloride 3%. 500 milliLiter(s) (50 mL/Hr) IV Continuous <Continuous>    MEDICATIONS  (PRN):  acetaminophen   IVPB .. 1000 milliGRAM(s) IV Intermittent every 6 hours PRN Mild Pain (1 - 3)  ondansetron Injectable 4 milliGRAM(s) IV Push every 6 hours PRN Nausea and/or Vomiting      IMAGING:  < from: [facility] Duplex Lower Ext Vein Scan, Bilat (06.05.24 @ 16:10) >  IMPRESSION:  No evidence of deep venous thrombosis in either lower extremity.    < end of copied text >  < from: CT Head No Cont (06.05.24 @ 12:04) >    IMPRESSION:    No interval change.    --- End of Report ---    < end of copied text >      EXAMINATION:  PHYSICAL EXAM:    Constitutional: No Acute Distress     Neurological: Ox3, PERRL, right facial, dysarthric. FC, Left side 5/5, RUE 3/5 rue drift, RLE 3/5, R-sided neglect    Pulmonary: Clear to Auscultation, No rales, No rhonchi, No wheezes     Cardiovascular: S1, S2, Regular rate and rhythm     Gastrointestinal: Soft, Non-tender, Non-distended     Extremities: No calf tenderness    HOSPITAL COURSE: 83F on ASA81, Gujurati speaking, h/o DM, HTN, HLD, chronic LBP, p/f inability to move Rt side at 430AM today. 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 CTA negative.    24 hour Events:     06/06: NO overnight events  06/7: Stroke consult pending if CTH stable plan to transition to stroke   06/8: Patient was started on Bactrim for a UTI, pending UCx    Allergies    penicillin (Unknown)  quinine (Unknown)    Intolerances        REVIEW OF SYSTEMS: [ ] Unable to Assess due to neurologic exam   [X ] All ROS addressed below are non-contributory, except:  Neuro: [ ] Headache [ ] Back pain [ ] Numbness [ ] Weakness [ ] Ataxia [ ] Dizziness [ ] Aphasia [ ] Dysarthria [ ] Visual disturbance  Resp: [ ] Shortness of breath/dyspnea, [ ] Orthopnea [ ] Cough  CV: [ ] Chest pain [ ] Palpitation [ ] Lightheadedness [ ] Syncope  Renal: [ ] Thirst [ ] Edema  GI: [ ] Nausea [ ] Emesis [ ] Abdominal pain [ ] Constipation [ ] Diarrhea  Hem: [ ] Hematemesis [ ] bright red blood per rectum  ID: [ ] Fever [ ] Chills [ ] Dysuria  ENT: [ ] Rhinorrhea      DEVICES:   [ ] Restraints [ ] ET tube [ ] central line [ ] arterial line [ ] gomez [ ] NGT/OGT [ ] EVD [ ] LD [ ] SANDRO/HMV [ ] Trach [ ] PEG [ ] Chest Tube     VITALS:   Vital Signs Last 24 Hrs  T(C): 37.1 (06 Jun 2024 23:00), Max: 37.1 (06 Jun 2024 23:00)  T(F): 98.7 (06 Jun 2024 23:00), Max: 98.7 (06 Jun 2024 23:00)  HR: 92 (07 Jun 2024 01:30) (61 - 105)  BP: 123/58 (07 Jun 2024 01:30) (107/53 - 171/72)  BP(mean): 84 (07 Jun 2024 01:30) (77 - 111)  RR: 20 (07 Jun 2024 01:30) (15 - 28)  SpO2: 97% (07 Jun 2024 01:30) (85% - 100%)    Parameters below as of 06 Jun 2024 19:00  Patient On (Oxygen Delivery Method): nasal cannula  O2 Flow (L/min): 3      CAPILLARY BLOOD GLUCOSE    POCT Blood Glucose.: 179 mg/dL (06 Jun 2024 23:30)  POCT Blood Glucose.: 152 mg/dL (06 Jun 2024 16:47)  POCT Blood Glucose.: 167 mg/dL (06 Jun 2024 10:58)  POCT Blood Glucose.: 123 mg/dL (06 Jun 2024 05:02)      I&O's Summary    05 Jun 2024 07:01  -  06 Jun 2024 07:00  --------------------------------------------------------  IN: 1475 mL / OUT: 2195 mL / NET: -720 mL    06 Jun 2024 07:01  -  07 Jun 2024 01:52  --------------------------------------------------------  IN: 1512.5 mL / OUT: 1000 mL / NET: 512.5 mL                            11.4   11.12 )-----------( 301      ( 06 Jun 2024 01:30 )             36.7   06-07    147<H>  |  114<H>  |  16  ----------------------------<  192<H>  4.2   |  21<L>  |  0.61    Ca    9.6      07 Jun 2024 05:59  Phos  2.6     06-06  Mg     2.2     06-06    TPro  7.5  /  Alb  4.2  /  TBili  0.8  /  DBili  x   /  AST  19  /  ALT  14  /  AlkPhos  63  06-05       MEDICATIONS  (STANDING):  atorvastatin 10 milliGRAM(s) Oral at bedtime  baclofen 10 milliGRAM(s) Oral daily  chlorhexidine 4% Liquid 1 Application(s) Topical daily  gabapentin 300 milliGRAM(s) Oral daily  insulin lispro (ADMELOG) corrective regimen sliding scale   SubCutaneous every 6 hours  losartan 100 milliGRAM(s) Oral daily  niCARdipine Infusion 5 mG/Hr (25 mL/Hr) IV Continuous <Continuous>  pantoprazole    Tablet 40 milliGRAM(s) Oral before breakfast  polyethylene glycol 3350 17 Gram(s) Oral daily  potassium chloride    Tablet ER 20 milliEquivalent(s) Oral once  potassium phosphate IVPB 15 milliMole(s) IV Intermittent once  senna 2 Tablet(s) Oral at bedtime  sodium chloride 3%. 500 milliLiter(s) (50 mL/Hr) IV Continuous <Continuous>    MEDICATIONS  (PRN):  acetaminophen   IVPB .. 1000 milliGRAM(s) IV Intermittent every 6 hours PRN Mild Pain (1 - 3)  ondansetron Injectable 4 milliGRAM(s) IV Push every 6 hours PRN Nausea and/or Vomiting      IMAGING:  < from: [facility] Duplex Lower Ext Vein Scan, Bilat (06.05.24 @ 16:10) >  IMPRESSION:  No evidence of deep venous thrombosis in either lower extremity.    < end of copied text >  < from: CT Head No Cont (06.05.24 @ 12:04) >    IMPRESSION:    No interval change.    --- End of Report ---    < end of copied text >      EXAMINATION:  PHYSICAL EXAM:    Constitutional: No Acute Distress     Neurological: Ox3, PERRL, right facial, dysarthric. FC, Left side 5/5, RUE 3/5 rue drift, RLE 3/5, R-sided neglect    Pulmonary: Clear to Auscultation, No rales, No rhonchi, No wheezes     Cardiovascular: S1, S2, Regular rate and rhythm     Gastrointestinal: Soft, Non-tender, Non-distended     Extremities: No calf tenderness

## 2024-06-09 LAB
ANION GAP SERPL CALC-SCNC: 13 MMOL/L — SIGNIFICANT CHANGE UP (ref 5–17)
BASOPHILS # BLD AUTO: 0.06 K/UL — SIGNIFICANT CHANGE UP (ref 0–0.2)
BASOPHILS NFR BLD AUTO: 0.6 % — SIGNIFICANT CHANGE UP (ref 0–2)
BUN SERPL-MCNC: 44 MG/DL — HIGH (ref 7–23)
CALCIUM SERPL-MCNC: 9.9 MG/DL — SIGNIFICANT CHANGE UP (ref 8.4–10.5)
CHLORIDE SERPL-SCNC: 116 MMOL/L — HIGH (ref 96–108)
CO2 SERPL-SCNC: 23 MMOL/L — SIGNIFICANT CHANGE UP (ref 22–31)
CREAT SERPL-MCNC: 0.89 MG/DL — SIGNIFICANT CHANGE UP (ref 0.5–1.3)
EGFR: 65 ML/MIN/1.73M2 — SIGNIFICANT CHANGE UP
EOSINOPHIL # BLD AUTO: 0.07 K/UL — SIGNIFICANT CHANGE UP (ref 0–0.5)
EOSINOPHIL NFR BLD AUTO: 0.7 % — SIGNIFICANT CHANGE UP (ref 0–6)
GLUCOSE BLDC GLUCOMTR-MCNC: 125 MG/DL — HIGH (ref 70–99)
GLUCOSE BLDC GLUCOMTR-MCNC: 158 MG/DL — HIGH (ref 70–99)
GLUCOSE BLDC GLUCOMTR-MCNC: 169 MG/DL — HIGH (ref 70–99)
GLUCOSE BLDC GLUCOMTR-MCNC: 190 MG/DL — HIGH (ref 70–99)
GLUCOSE SERPL-MCNC: 149 MG/DL — HIGH (ref 70–99)
HCT VFR BLD CALC: 36 % — SIGNIFICANT CHANGE UP (ref 34.5–45)
HGB BLD-MCNC: 11.2 G/DL — LOW (ref 11.5–15.5)
IMM GRANULOCYTES NFR BLD AUTO: 0.5 % — SIGNIFICANT CHANGE UP (ref 0–0.9)
LYMPHOCYTES # BLD AUTO: 2.13 K/UL — SIGNIFICANT CHANGE UP (ref 1–3.3)
LYMPHOCYTES # BLD AUTO: 22.7 % — SIGNIFICANT CHANGE UP (ref 13–44)
MAGNESIUM SERPL-MCNC: 2.1 MG/DL — SIGNIFICANT CHANGE UP (ref 1.6–2.6)
MCHC RBC-ENTMCNC: 26.2 PG — LOW (ref 27–34)
MCHC RBC-ENTMCNC: 31.1 GM/DL — LOW (ref 32–36)
MCV RBC AUTO: 84.3 FL — SIGNIFICANT CHANGE UP (ref 80–100)
MONOCYTES # BLD AUTO: 0.75 K/UL — SIGNIFICANT CHANGE UP (ref 0–0.9)
MONOCYTES NFR BLD AUTO: 8 % — SIGNIFICANT CHANGE UP (ref 2–14)
NEUTROPHILS # BLD AUTO: 6.32 K/UL — SIGNIFICANT CHANGE UP (ref 1.8–7.4)
NEUTROPHILS NFR BLD AUTO: 67.5 % — SIGNIFICANT CHANGE UP (ref 43–77)
NRBC # BLD: 0 /100 WBCS — SIGNIFICANT CHANGE UP (ref 0–0)
PHOSPHATE SERPL-MCNC: 3.6 MG/DL — SIGNIFICANT CHANGE UP (ref 2.5–4.5)
PLATELET # BLD AUTO: 267 K/UL — SIGNIFICANT CHANGE UP (ref 150–400)
POTASSIUM SERPL-MCNC: 4 MMOL/L — SIGNIFICANT CHANGE UP (ref 3.5–5.3)
POTASSIUM SERPL-SCNC: 4 MMOL/L — SIGNIFICANT CHANGE UP (ref 3.5–5.3)
RBC # BLD: 4.27 M/UL — SIGNIFICANT CHANGE UP (ref 3.8–5.2)
RBC # FLD: 16.3 % — HIGH (ref 10.3–14.5)
SODIUM SERPL-SCNC: 152 MMOL/L — HIGH (ref 135–145)
WBC # BLD: 9.38 K/UL — SIGNIFICANT CHANGE UP (ref 3.8–10.5)
WBC # FLD AUTO: 9.38 K/UL — SIGNIFICANT CHANGE UP (ref 3.8–10.5)

## 2024-06-09 RX ADMIN — Medication 5 MILLIGRAM(S): at 21:19

## 2024-06-09 RX ADMIN — Medication 100 MILLIGRAM(S): at 00:04

## 2024-06-09 RX ADMIN — Medication 650 MILLIGRAM(S): at 23:45

## 2024-06-09 RX ADMIN — PANTOPRAZOLE SODIUM 40 MILLIGRAM(S): 20 TABLET, DELAYED RELEASE ORAL at 12:09

## 2024-06-09 RX ADMIN — LOSARTAN POTASSIUM 100 MILLIGRAM(S): 100 TABLET, FILM COATED ORAL at 05:48

## 2024-06-09 RX ADMIN — Medication 650 MILLIGRAM(S): at 00:09

## 2024-06-09 RX ADMIN — POLYETHYLENE GLYCOL 3350 17 GRAM(S): 17 POWDER, FOR SOLUTION ORAL at 12:10

## 2024-06-09 RX ADMIN — Medication 2: at 12:10

## 2024-06-09 RX ADMIN — Medication 2: at 21:17

## 2024-06-09 RX ADMIN — Medication 650 MILLIGRAM(S): at 12:08

## 2024-06-09 RX ADMIN — Medication 5 MILLIGRAM(S): at 05:48

## 2024-06-09 RX ADMIN — Medication 5 MILLIGRAM(S): at 12:09

## 2024-06-09 RX ADMIN — ATORVASTATIN CALCIUM 10 MILLIGRAM(S): 80 TABLET, FILM COATED ORAL at 21:18

## 2024-06-09 RX ADMIN — Medication 1 TABLET(S): at 05:48

## 2024-06-09 RX ADMIN — Medication 100 MILLIGRAM(S): at 05:48

## 2024-06-09 RX ADMIN — Medication 650 MILLIGRAM(S): at 23:21

## 2024-06-09 RX ADMIN — ENOXAPARIN SODIUM 40 MILLIGRAM(S): 100 INJECTION SUBCUTANEOUS at 12:10

## 2024-06-09 RX ADMIN — Medication 650 MILLIGRAM(S): at 12:45

## 2024-06-09 RX ADMIN — Medication 2: at 07:44

## 2024-06-09 RX ADMIN — SENNA PLUS 2 TABLET(S): 8.6 TABLET ORAL at 21:19

## 2024-06-09 RX ADMIN — Medication 100 MILLIGRAM(S): at 16:47

## 2024-06-09 RX ADMIN — INSULIN GLARGINE 5 UNIT(S): 100 INJECTION, SOLUTION SUBCUTANEOUS at 21:17

## 2024-06-09 RX ADMIN — GABAPENTIN 300 MILLIGRAM(S): 400 CAPSULE ORAL at 12:09

## 2024-06-09 RX ADMIN — Medication 1 TABLET(S): at 16:48

## 2024-06-09 RX ADMIN — Medication 100 MILLIGRAM(S): at 12:09

## 2024-06-09 NOTE — PROGRESS NOTE ADULT - SUBJECTIVE AND OBJECTIVE BOX
THE PATIENT WAS SEEN AND EXAMINED BY ME WITH THE HOUSESTAFF AND STROKE TEAM DURING MORNING ROUNDS.   HPI:  83F on ASA81, Gujarrati speaking, h/o DM, HTN, HLD, chronic LBP, p/f inability to move Rt side at 430AM today. 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 CTA negative. SBP in the 180s. Plts/coags wnl. Exam: EOV, awake, Ox3, brisk FC, Lt side 5/5, RUE 4-/5, RLE 3/5  (05 Jun 2024 07:26)      SUBJECTIVE: No events overnight.  No new neurologic complaints.      acetaminophen     Tablet .. 650 milliGRAM(s) Oral every 6 hours PRN  atorvastatin 10 milliGRAM(s) Oral at bedtime  baclofen 5 milliGRAM(s) Oral every 8 hours  chlorhexidine 4% Liquid 1 Application(s) Topical daily  enoxaparin Injectable 40 milliGRAM(s) SubCutaneous every 24 hours  gabapentin 300 milliGRAM(s) Oral daily  insulin glargine Injectable (LANTUS) 5 Unit(s) SubCutaneous at bedtime  insulin lispro (ADMELOG) corrective regimen sliding scale   SubCutaneous Before meals and at bedtime  labetalol 100 milliGRAM(s) Oral every 6 hours  losartan 100 milliGRAM(s) Oral daily  ondansetron Injectable 4 milliGRAM(s) IV Push every 6 hours PRN  pantoprazole  Injectable 40 milliGRAM(s) IV Push daily  phenazopyridine 100 milliGRAM(s) Oral every 8 hours PRN  polyethylene glycol 3350 17 Gram(s) Oral daily  senna 2 Tablet(s) Oral at bedtime  trimethoprim  160 mG/sulfamethoxazole 800 mG 1 Tablet(s) Oral two times a day      PHYSICAL EXAM:   Vital Signs Last 24 Hrs  T(C): 37.2 (09 Jun 2024 04:07), Max: 37.2 (09 Jun 2024 04:07)  T(F): 98.9 (09 Jun 2024 04:07), Max: 98.9 (09 Jun 2024 04:07)  HR: 65 (09 Jun 2024 06:38) (55 - 75)  BP: 154/67 (09 Jun 2024 06:38) (131/62 - 177/76)  BP(mean): 96 (09 Jun 2024 06:38) (89 - 109)  RR: 25 (09 Jun 2024 06:38) (16 - 45)  SpO2: 93% (09 Jun 2024 06:38) (92% - 100%)    Parameters below as of 09 Jun 2024 00:00  Patient On (Oxygen Delivery Method): nasal cannula  O2 Flow (L/min): 2      General: No acute distress  HEENT: EOM intact, visual fields full  Abdomen: Soft, nontender, nondistended   Extremities: No edema    NEUROLOGICAL EXAM:  Mental status: Awake, alert, oriented x3, no aphasia, no neglect, normal memory   Cranial Nerves: No facial asymmetry, no nystagmus, no dysarthria,  tongue midline  Motor exam: Normal tone, no drift, 5/5 RUE, 5/5 RLE, 5/5 LUE, 5/5 LLE, normal fine finger movements.  Sensation: Intact to light touch   Coordination/ Gait: No dysmetria, JANAK intact and symmetric bilaterally    LABS:                        11.2   9.38  )-----------( 267      ( 09 Jun 2024 06:22 )             36.0    06-09    152<H>  |  116<H>  |  44<H>  ----------------------------<  149<H>  4.0   |  23  |  0.89    Ca    9.9      09 Jun 2024 06:18  Phos  3.6     06-09  Mg     2.1     06-09          IMAGING: Reviewed by me.      HPI:  83F on ASA81, Gujarrati speaking, h/o DM, HTN, HLD, chronic LBP, p/f inability to move Rt side at 430AM today. 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 CTA negative. SBP in the 180s. Plts/coags wnl. Exam: EOV, awake, Ox3, brisk FC, Lt side 5/5, RUE 4-/5, RLE 3/5  (05 Jun 2024 07:26)      SUBJECTIVE: No events overnight.  No new neurologic complaints.      acetaminophen     Tablet .. 650 milliGRAM(s) Oral every 6 hours PRN  atorvastatin 10 milliGRAM(s) Oral at bedtime  baclofen 5 milliGRAM(s) Oral every 8 hours  chlorhexidine 4% Liquid 1 Application(s) Topical daily  enoxaparin Injectable 40 milliGRAM(s) SubCutaneous every 24 hours  gabapentin 300 milliGRAM(s) Oral daily  insulin glargine Injectable (LANTUS) 5 Unit(s) SubCutaneous at bedtime  insulin lispro (ADMELOG) corrective regimen sliding scale   SubCutaneous Before meals and at bedtime  labetalol 100 milliGRAM(s) Oral every 6 hours  losartan 100 milliGRAM(s) Oral daily  ondansetron Injectable 4 milliGRAM(s) IV Push every 6 hours PRN  pantoprazole  Injectable 40 milliGRAM(s) IV Push daily  phenazopyridine 100 milliGRAM(s) Oral every 8 hours PRN  polyethylene glycol 3350 17 Gram(s) Oral daily  senna 2 Tablet(s) Oral at bedtime  trimethoprim  160 mG/sulfamethoxazole 800 mG 1 Tablet(s) Oral two times a day      PHYSICAL EXAM:   Vital Signs Last 24 Hrs  T(C): 37.2 (09 Jun 2024 04:07), Max: 37.2 (09 Jun 2024 04:07)  T(F): 98.9 (09 Jun 2024 04:07), Max: 98.9 (09 Jun 2024 04:07)  HR: 65 (09 Jun 2024 06:38) (55 - 75)  BP: 154/67 (09 Jun 2024 06:38) (131/62 - 177/76)  BP(mean): 96 (09 Jun 2024 06:38) (89 - 109)  RR: 25 (09 Jun 2024 06:38) (16 - 45)  SpO2: 93% (09 Jun 2024 06:38) (92% - 100%)    Parameters below as of 09 Jun 2024 00:00  Patient On (Oxygen Delivery Method): nasal cannula  O2 Flow (L/min): 2      General: No acute distress  HEENT: EOM intact, visual fields full  Abdomen: Soft, nontender, nondistended   Extremities: No edema    NEUROLOGICAL EXAM: INCOMPLETE  Mental status: Awake, alert, oriented x3, no aphasia, no neglect, normal memory   Cranial Nerves: No facial asymmetry, no nystagmus, no dysarthria,  tongue midline  Motor exam: Normal tone, no drift, 5/5 RUE, 5/5 RLE, 5/5 LUE, 5/5 LLE, normal fine finger movements.  Sensation: Intact to light touch   Coordination/ Gait: No dysmetria, JANAK intact and symmetric bilaterally    LABS:                        11.2   9.38  )-----------( 267      ( 09 Jun 2024 06:22 )             36.0    06-09    152<H>  |  116<H>  |  44<H>  ----------------------------<  149<H>  4.0   |  23  |  0.89    Ca    9.9      09 Jun 2024 06:18  Phos  3.6     06-09  Mg     2.1     06-09          IMAGING: Reviewed by me.       CT Brain Stroke Protocol (06.05.24 @ 05:29)  COMPARISON: None.    PROCEDURE:  Noncontrast CT of the Head was performed from the skull base to the   vertex. Coronal and Sagittal reformats were obtained.    FINDINGS:    Acute 2.6 x 2.0 x 2.6 cm left thalamocapsular/corona radiata hemorrhage   with intraventricular extension within the lateral, third, and fourth   ventricles. Mild prominence of the ventricular system, which may be on   the basis of cerebral volume loss. Mild patchy hypodensities within the   periventricular and subcortical white matter, although nonspecific,   likely reflect chronic microvascular disease.    Paranasal sinuses are clear. Under pneumatized age and of the bilateral   mastoid air cells. Status post bilateral ocular lens replacement.   Calvarium is intact.    IMPRESSION:    Acute left thalamocapsular/corona radiata parenchymal hemorrhage with  associated intraventricular extension. Mild ventriculomegaly which may be   in the basis of cerebral volume loss.    Dr. Frazier discussed the above findings with Dr. Rg at 5:34 AM on 6/5/2024   with read back.        CT Angio Brain Stroke Protocol  w/ IV Cont (06.05.24 @ 05:39)  IMPRESSION:    CTA Neck: No significant flow-limiting stenosis or evidence of acute   dissection within the cervical carotid or vertebral arteries.    CTA Head: No proximal large vessel occlusion, significant stenosis, or   evidence of intracranial aneurysm.    CT Perfusion: Nondiagnostic.        CT Head No Cont (06.07.24 @ 09:36)  IMPRESSION:  No significant interval change.        CT Head No Cont (06.05.24 @ 12:04)  IMPRESSION:  No interval change.      -----    Xray Chest 1 View- PORTABLE-Routine (Xray Chest 1 View- PORTABLE-Routine .) (06.07.24 @ 10:51)  IMPRESSION:    Negative for acute pulmonary disease.      VA Duplex Lower Ext Vein Scan, Bilat (06.05.24 @ 16:10)  IMPRESSION:  No evidence of deep venous thrombosis in either lower extremity.      TTE W or WO Ultrasound Enhancing Agent (06.05.24 @ 15:38)  CONCLUSIONS:      1.Left ventricular cavity is normal in size. Left ventricular systolic function is hyperdynamic with an ejection fraction of 82 % by Chauhan's method of disks. There are no regional wall motion abnormalities seen.   2. Normal right ventricular cavity size and normal systolic function. Tricuspid annular plane systolic excursion (TAPSE) is 1.9 cm (normal >=1.7 cm). Tricuspid annular tissue Doppler S' is 14.0 cm/s (normal >10 cm/s).   3. No significant valvular disease.   4. The inferior vena cava is normal in size (normal <2.1cm) with normal inspiratory collapse (normal >50%) consistent with normal right atrial pressure (~3, range 0-5mmHg).   5. No prior echocardiogram is available for comparison.    HPI:  83F on ASA81, Gujarrati speaking, h/o DM, HTN, HLD, chronic LBP, p/f inability to move Rt side at 430AM today. 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 CTA negative. SBP in the 180s. Plts/coags wnl. Exam: EOV, awake, Ox3, brisk FC, Lt side 5/5, RUE 4-/5, RLE 3/5  (05 Jun 2024 07:26)    INTERVAL EVENTS: Yesterday, patient seen by [facility] - "evidence of oropharyngeal dysphagia," recommending MBS. Repeat bilateral LE venous dopplers pending. On Bactrim for UTI; and straight cath protocol.    SUBJECTIVE: No new neurologic complaints.       acetaminophen     Tablet .. 650 milliGRAM(s) Oral every 6 hours PRN  atorvastatin 10 milliGRAM(s) Oral at bedtime  baclofen 5 milliGRAM(s) Oral every 8 hours  chlorhexidine 4% Liquid 1 Application(s) Topical daily  enoxaparin Injectable 40 milliGRAM(s) SubCutaneous every 24 hours  gabapentin 300 milliGRAM(s) Oral daily  insulin glargine Injectable (LANTUS) 5 Unit(s) SubCutaneous at bedtime  insulin lispro (ADMELOG) corrective regimen sliding scale   SubCutaneous Before meals and at bedtime  labetalol 100 milliGRAM(s) Oral every 6 hours  losartan 100 milliGRAM(s) Oral daily  ondansetron Injectable 4 milliGRAM(s) IV Push every 6 hours PRN  pantoprazole  Injectable 40 milliGRAM(s) IV Push daily  phenazopyridine 100 milliGRAM(s) Oral every 8 hours PRN  polyethylene glycol 3350 17 Gram(s) Oral daily  senna 2 Tablet(s) Oral at bedtime  trimethoprim  160 mG/sulfamethoxazole 800 mG 1 Tablet(s) Oral two times a day      PHYSICAL EXAM:   Vital Signs Last 24 Hrs  T(C): 37.2 (09 Jun 2024 04:07), Max: 37.2 (09 Jun 2024 04:07)  T(F): 98.9 (09 Jun 2024 04:07), Max: 98.9 (09 Jun 2024 04:07)  HR: 65 (09 Jun 2024 06:38) (55 - 75)  BP: 154/67 (09 Jun 2024 06:38) (131/62 - 177/76)  BP(mean): 96 (09 Jun 2024 06:38) (89 - 109)  RR: 25 (09 Jun 2024 06:38) (16 - 45)  SpO2: 93% (09 Jun 2024 06:38) (92% - 100%)    Parameters below as of 09 Jun 2024 00:00  Patient On (Oxygen Delivery Method): nasal cannula  O2 Flow (L/min): 2      General: No acute distress  HEENT: EOM intact, visual fields full  Abdomen: Soft, nontender, nondistended   Extremities: No edema    NEUROLOGICAL EXAM: INCOMPLETE  Mental status: Awake, alert, oriented x3, no aphasia, no neglect, normal memory   Cranial Nerves: No facial asymmetry, no nystagmus, no dysarthria,  tongue midline  Motor exam: Normal tone, no drift, 5/5 RUE, 5/5 RLE, 5/5 LUE, 5/5 LLE, normal fine finger movements.  Sensation: Intact to light touch   Coordination/ Gait: No dysmetria, JANAK intact and symmetric bilaterally    LABS:                        11.2   9.38  )-----------( 267      ( 09 Jun 2024 06:22 )             36.0    06-09    152<H>  |  116<H>  |  44<H>  ----------------------------<  149<H>  4.0   |  23  |  0.89    Ca    9.9      09 Jun 2024 06:18  Phos  3.6     06-09  Mg     2.1     06-09          IMAGING: Reviewed by me.       CT Brain Stroke Protocol (06.05.24 @ 05:29)  COMPARISON: None.    PROCEDURE:  Noncontrast CT of the Head was performed from the skull base to the   vertex. Coronal and Sagittal reformats were obtained.    FINDINGS:    Acute 2.6 x 2.0 x 2.6 cm left thalamocapsular/corona radiata hemorrhage   with intraventricular extension within the lateral, third, and fourth   ventricles. Mild prominence of the ventricular system, which may be on   the basis of cerebral volume loss. Mild patchy hypodensities within the   periventricular and subcortical white matter, although nonspecific,   likely reflect chronic microvascular disease.    Paranasal sinuses are clear. Under pneumatized age and of the bilateral   mastoid air cells. Status post bilateral ocular lens replacement.   Calvarium is intact.    IMPRESSION:    Acute left thalamocapsular/corona radiata parenchymal hemorrhage with  associated intraventricular extension. Mild ventriculomegaly which may be   in the basis of cerebral volume loss.    Dr. Frazier discussed the above findings with Dr. Rg at 5:34 AM on 6/5/2024   with read back.        CT Angio Brain Stroke Protocol  w/ IV Cont (06.05.24 @ 05:39)  IMPRESSION:    CTA Neck: No significant flow-limiting stenosis or evidence of acute   dissection within the cervical carotid or vertebral arteries.    CTA Head: No proximal large vessel occlusion, significant stenosis, or   evidence of intracranial aneurysm.    CT Perfusion: Nondiagnostic.        CT Head No Cont (06.07.24 @ 09:36)  IMPRESSION:  No significant interval change.        CT Head No Cont (06.05.24 @ 12:04)  IMPRESSION:  No interval change.      -----    Xray Chest 1 View- PORTABLE-Routine (Xray Chest 1 View- PORTABLE-Routine .) (06.07.24 @ 10:51)  IMPRESSION:    Negative for acute pulmonary disease.      VA Duplex Lower Ext Vein Scan, Bilat (06.05.24 @ 16:10)  IMPRESSION:  No evidence of deep venous thrombosis in either lower extremity.      TTE W or WO Ultrasound Enhancing Agent (06.05.24 @ 15:38)  CONCLUSIONS:      1.Left ventricular cavity is normal in size. Left ventricular systolic function is hyperdynamic with an ejection fraction of 82 % by Chauhan's method of disks. There are no regional wall motion abnormalities seen.   2. Normal right ventricular cavity size and normal systolic function. Tricuspid annular plane systolic excursion (TAPSE) is 1.9 cm (normal >=1.7 cm). Tricuspid annular tissue Doppler S' is 14.0 cm/s (normal >10 cm/s).   3. No significant valvular disease.   4. The inferior vena cava is normal in size (normal <2.1cm) with normal inspiratory collapse (normal >50%) consistent with normal right atrial pressure (~3, range 0-5mmHg).   5. No prior echocardiogram is available for comparison.    THE PATIENT WAS SEEN AND EXAMINED BY ME WITH THE HOUSESTAFF AND STROKE TEAM DURING MORNING ROUNDS.     HPI:   82 YO F on ASA81, Gujarrati speaking, h/o DM, HTN, HLD, chronic LBP, p/f inability to move Rt side at 430AM today. 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 CTA negative. SBP in the 180s. Plts/coags wnl. Exam: EOV, awake, Ox3, brisk FC, Lt side 5/5, RUE 4-/5, RLE 3/5. Yesterday, patient seen by [facility] - "evidence of oropharyngeal dysphagia," recommending MBS. Repeat bilateral LE venous dopplers pending. On Bactrim for UTI; and straight cath protocol.    SUBJECTIVE: No new neurologic complaints.     acetaminophen     Tablet .. 650 milliGRAM(s) Oral every 6 hours PRN  atorvastatin 10 milliGRAM(s) Oral at bedtime  baclofen 5 milliGRAM(s) Oral every 8 hours  chlorhexidine 4% Liquid 1 Application(s) Topical daily  enoxaparin Injectable 40 milliGRAM(s) SubCutaneous every 24 hours  gabapentin 300 milliGRAM(s) Oral daily  insulin glargine Injectable (LANTUS) 5 Unit(s) SubCutaneous at bedtime  insulin lispro (ADMELOG) corrective regimen sliding scale   SubCutaneous Before meals and at bedtime  labetalol 100 milliGRAM(s) Oral every 6 hours  losartan 100 milliGRAM(s) Oral daily  ondansetron Injectable 4 milliGRAM(s) IV Push every 6 hours PRN  pantoprazole  Injectable 40 milliGRAM(s) IV Push daily  phenazopyridine 100 milliGRAM(s) Oral every 8 hours PRN  polyethylene glycol 3350 17 Gram(s) Oral daily  senna 2 Tablet(s) Oral at bedtime  trimethoprim  160 mG/sulfamethoxazole 800 mG 1 Tablet(s) Oral two times a day      PHYSICAL EXAM:   Vital Signs Last 24 Hrs  T(C): 37.2 (09 Jun 2024 04:07), Max: 37.2 (09 Jun 2024 04:07)  T(F): 98.9 (09 Jun 2024 04:07), Max: 98.9 (09 Jun 2024 04:07)  HR: 65 (09 Jun 2024 06:38) (55 - 75)  BP: 154/67 (09 Jun 2024 06:38) (131/62 - 177/76)  BP(mean): 96 (09 Jun 2024 06:38) (89 - 109)  RR: 25 (09 Jun 2024 06:38) (16 - 45)  SpO2: 93% (09 Jun 2024 06:38) (92% - 100%)    Parameters below as of 09 Jun 2024 00:00  Patient On (Oxygen Delivery Method): nasal cannula  O2 Flow (L/min): 2      General: No acute distress  HEENT: EOM intact, visual fields full  Abdomen: Soft, nontender, nondistended   Extremities: No edema    NEUROLOGICAL EXAM:   Mental status: Awake and alert. Attentive to examiner. AOx2. No neglect. Follows simple commands.   Cranial Nerves: Mild right facial droop. Mild dysarthria. No aphasia.   Motor exam: Normal tone. RUE some effort 3/5, RLE some effort 4/5, with drift. 5/5 LUE, 5/5 LLE.   Sensation: Intact to light touch   Coordination/ Gait: Unable to asses at this time       LABS:                        11.2   9.38  )-----------( 267      ( 09 Jun 2024 06:22 )             36.0    06-09    152<H>  |  116<H>  |  44<H>  ----------------------------<  149<H>  4.0   |  23  |  0.89    Ca    9.9      09 Jun 2024 06:18  Phos  3.6     06-09  Mg     2.1     06-09          IMAGING: Reviewed by me.       CT Brain Stroke Protocol (06.05.24 @ 05:29)  COMPARISON: None.    PROCEDURE:  Noncontrast CT of the Head was performed from the skull base to the   vertex. Coronal and Sagittal reformats were obtained.    FINDINGS:    Acute 2.6 x 2.0 x 2.6 cm left thalamocapsular/corona radiata hemorrhage   with intraventricular extension within the lateral, third, and fourth   ventricles. Mild prominence of the ventricular system, which may be on   the basis of cerebral volume loss. Mild patchy hypodensities within the   periventricular and subcortical white matter, although nonspecific,   likely reflect chronic microvascular disease.    Paranasal sinuses are clear. Under pneumatized age and of the bilateral   mastoid air cells. Status post bilateral ocular lens replacement.   Calvarium is intact.    IMPRESSION:    Acute left thalamocapsular/corona radiata parenchymal hemorrhage with  associated intraventricular extension. Mild ventriculomegaly which may be   in the basis of cerebral volume loss.    Dr. Frazier discussed the above findings with Dr. Rg at 5:34 AM on 6/5/2024   with read back.        CT Angio Brain Stroke Protocol  w/ IV Cont (06.05.24 @ 05:39)  IMPRESSION:    CTA Neck: No significant flow-limiting stenosis or evidence of acute   dissection within the cervical carotid or vertebral arteries.    CTA Head: No proximal large vessel occlusion, significant stenosis, or   evidence of intracranial aneurysm.    CT Perfusion: Nondiagnostic.        CT Head No Cont (06.07.24 @ 09:36)  IMPRESSION:  No significant interval change.        CT Head No Cont (06.05.24 @ 12:04)  IMPRESSION:  No interval change.      -----    Xray Chest 1 View- PORTABLE-Routine (Xray Chest 1 View- PORTABLE-Routine .) (06.07.24 @ 10:51)  IMPRESSION:    Negative for acute pulmonary disease.      VA Duplex Lower Ext Vein Scan, Bilat (06.05.24 @ 16:10)  IMPRESSION:  No evidence of deep venous thrombosis in either lower extremity.      TTE W or WO Ultrasound Enhancing Agent (06.05.24 @ 15:38)  CONCLUSIONS:      1.Left ventricular cavity is normal in size. Left ventricular systolic function is hyperdynamic with an ejection fraction of 82 % by Chauhan's method of disks. There are no regional wall motion abnormalities seen.   2. Normal right ventricular cavity size and normal systolic function. Tricuspid annular plane systolic excursion (TAPSE) is 1.9 cm (normal >=1.7 cm). Tricuspid annular tissue Doppler S' is 14.0 cm/s (normal >10 cm/s).   3. No significant valvular disease.   4. The inferior vena cava is normal in size (normal <2.1cm) with normal inspiratory collapse (normal >50%) consistent with normal right atrial pressure (~3, range 0-5mmHg).   5. No prior echocardiogram is available for comparison.

## 2024-06-09 NOTE — PROGRESS NOTE ADULT - ASSESSMENT
ASSESSMENT:       Impression: R-sided hemiparesis, c/w L thalamic IPH with IVH. The most likely etiology is hypertensive, but would proceed with MR brain IPH protocol.    NEURO: Continue close monitoring for neurologic deterioration, permissive HTN, titrate statin to LDL goal less than 70, MRI Brain w/o, MRA Head w/o and Neck w/contrast. Physical therapy/OT/Speech eval/treatment.     ANTITHROMBOTIC THERAPY:     PULMONARY: CXR clear, protecting airway, saturating well     CARDIOVASCULAR: check TTE, cardiac monitoring                              SBP goal:     GASTROINTESTINAL:  dysphagia screen       Diet:     RENAL: BUN/Cr stable, good urine output      Na Goal: Greater than 135     Harden:    HEMATOLOGY: H/H stable, Platelets normal      DVT ppx: Heparin s.c [] LMWH []     ID: afebrile, no leukocytosis     OTHER:     DISPOSITION: Rehab or home depending on PT eval once stable and workup is complete      CORE MEASURES:        Admission NIHSS:      TPA: [] YES [] NO      LDL/HDL:     Depression Screen:      Statin Therapy:     Dysphagia Screen: [] PASS [] FAIL     Smoking [] YES [] NO      Afib [] YES [] NO     Stroke Education [] YES [] NO    Obtain screening lower extremity venous ultrasound in patients who meet 1 or more of the following criteria as patient is high risk for DVT/PE on admission:   [] History of DVT/PE  []Hypercoagulable states (Factor V Leiden, Cancer, OCP, etc. )  []Prolonged immobility (hemiplegia/hemiparesis/post operative or any other extended immobilization)  [] Transferred from outside facility (Rehab or Long term care)  [] Age </= to 50 ASSESSMENT: 83-year-old female with PMHx HTN, HLD, DM, spinal stenosis, who presents w/ CC of abnormal CTH at outside hospital, showing "acute left thalamocapsular / corona radiata parenchymal hemorrhage with associated intraventricular extension," associated with right hemiparesis.    LKW: 4am 6/5  NIHSS: 7  mRS: 1    Impression: Right hemiparesis, c/w L thalamic IPH with IVH. The most likely etiology is hypertensive, but would proceed with MR brain IPH protocol.    NEURO: Continue close monitoring for neurologic deterioration, BP goal < 140/90, Pending MRI brain w/ contrast IPH protocol.    ANTITHROMBOTIC THERAPY: None    PULMONARY: CXR clear, protecting airway, saturating well     CARDIOVASCULAR: TTE 6/5: EF 82%, no significant valvular disease, normal LA; c/w cardiac monitoring                              SBP goal: < 140/90    GASTROINTESTINAL: oropharyngeal dysphagia on S/S evaluation; pending MBS     Diet: Pureed    RENAL: BUN & Cr elevated (44/0.89)     Na Goal: Greater than 135     Harden: straight cath for residual urine    HEMATOLOGY: H/H relatively stable (11.2/36), platelets within normal limits     DVT ppx: Heparin s.c [] LMWH [x]     ID: afebrile, no leukocytosis     OTHER:     DISPOSITION: Acute inpatient rehab     CORE MEASURES:        Admission NIHSS: 7     TPA: [] YES [x] NO      LDL/HDL: 33/57     Depression Screen:      Statin Therapy: atorvastatin 10 mg qHS     Dysphagia Screen: oropharyngeal weakness 6/5/24     Smoking [] YES [] NO      Afib [] YES [] NO     Stroke Education [] YES [] NO    Obtain screening lower extremity venous ultrasound in patients who meet 1 or more of the following criteria as patient is high risk for DVT/PE on admission:   [] History of DVT/PE  [] Hypercoagulable states (Factor V Leiden, Cancer, OCP, etc. )  [] Prolonged immobility (hemiplegia/hemiparesis/post operative or any other extended immobilization)  [] Transferred from outside facility (Rehab or Long term care)  [] Age </= to 50 83-year-old female with PMHx HTN, HLD, DM, spinal stenosis, who presents w/ CC of abnormal CTH at outside hospital, showing "acute left thalamocapsular / corona radiata parenchymal hemorrhage with associated intraventricular extension," associated with right hemiparesis.    IMPRESSION: Right hemiparesis, c/w L thalamic IPH with IVH. The most likely etiology is hypertensive.     NEURO: Neurologically improved since admission. Continue close monitoring for neurologic deterioration. Continue to monitor blood pressure. BP goal < 140/90. MRI Brain w/o, MRA Head w/o and Neck w/contrast as above. Physical therapy/OT: AR     ANTITHROMBOTIC THERAPY: IPH    PULMONARY: CXR clear, protecting airway, saturating well     CARDIOVASCULAR: TTE 6/5: EF 82%, no significant valvular disease, normal LA; c/w cardiac monitoring                              SBP goal: < 140/90    GASTROINTESTINAL: Oropharyngeal dysphagia on S/S evaluation; pending MBS     Diet: Pureed    RENAL: BUN & Cr elevated (44/0.89)     Na Goal: Greater than 135     Harden: NO, straight cath for residual urine    HEMATOLOGY: H/H relatively stable (11.2/36), platelets within normal limits     DVT ppx: Lovenox     ID: afebrile, no leukocytosis     OTHER: Discussed medication condition with family at bedside. Pending Jesu US r/o DVT    DISPOSITION: Acute inpatient rehab once stable for discharge      CORE MEASURES:        Admission NIHSS: 7     TPA: NO     LDL/HDL: 33/57     Depression Screen: N/A     Statin Therapy: YES      Dysphagia Screen: PASS     Smoking: NO      Afib: NO      Stroke Education: YES    Obtain screening lower extremity venous ultrasound in patients who meet 1 or more of the following criteria as patient is high risk for DVT/PE on admission:   [] History of DVT/PE  [] Hypercoagulable states (Factor V Leiden, Cancer, OCP, etc. )  [] Prolonged immobility (hemiplegia/hemiparesis/post operative or any other extended immobilization)  [] Transferred from outside facility (Rehab or Long term care)  [] Age </= to 50

## 2024-06-10 LAB
ANION GAP SERPL CALC-SCNC: 14 MMOL/L — SIGNIFICANT CHANGE UP (ref 5–17)
BASOPHILS # BLD AUTO: 0.05 K/UL — SIGNIFICANT CHANGE UP (ref 0–0.2)
BASOPHILS NFR BLD AUTO: 0.6 % — SIGNIFICANT CHANGE UP (ref 0–2)
BUN SERPL-MCNC: 53 MG/DL — HIGH (ref 7–23)
CALCIUM SERPL-MCNC: 9.7 MG/DL — SIGNIFICANT CHANGE UP (ref 8.4–10.5)
CHLORIDE SERPL-SCNC: 113 MMOL/L — HIGH (ref 96–108)
CO2 SERPL-SCNC: 23 MMOL/L — SIGNIFICANT CHANGE UP (ref 22–31)
CREAT SERPL-MCNC: 0.9 MG/DL — SIGNIFICANT CHANGE UP (ref 0.5–1.3)
EGFR: 64 ML/MIN/1.73M2 — SIGNIFICANT CHANGE UP
EOSINOPHIL # BLD AUTO: 0.11 K/UL — SIGNIFICANT CHANGE UP (ref 0–0.5)
EOSINOPHIL NFR BLD AUTO: 1.3 % — SIGNIFICANT CHANGE UP (ref 0–6)
GLUCOSE BLDC GLUCOMTR-MCNC: 133 MG/DL — HIGH (ref 70–99)
GLUCOSE BLDC GLUCOMTR-MCNC: 133 MG/DL — HIGH (ref 70–99)
GLUCOSE BLDC GLUCOMTR-MCNC: 147 MG/DL — HIGH (ref 70–99)
GLUCOSE BLDC GLUCOMTR-MCNC: 171 MG/DL — HIGH (ref 70–99)
GLUCOSE SERPL-MCNC: 133 MG/DL — HIGH (ref 70–99)
HCT VFR BLD CALC: 36.5 % — SIGNIFICANT CHANGE UP (ref 34.5–45)
HGB BLD-MCNC: 11.2 G/DL — LOW (ref 11.5–15.5)
IMM GRANULOCYTES NFR BLD AUTO: 0.3 % — SIGNIFICANT CHANGE UP (ref 0–0.9)
LYMPHOCYTES # BLD AUTO: 2.33 K/UL — SIGNIFICANT CHANGE UP (ref 1–3.3)
LYMPHOCYTES # BLD AUTO: 27.1 % — SIGNIFICANT CHANGE UP (ref 13–44)
MCHC RBC-ENTMCNC: 26.8 PG — LOW (ref 27–34)
MCHC RBC-ENTMCNC: 30.7 GM/DL — LOW (ref 32–36)
MCV RBC AUTO: 87.3 FL — SIGNIFICANT CHANGE UP (ref 80–100)
MONOCYTES # BLD AUTO: 0.7 K/UL — SIGNIFICANT CHANGE UP (ref 0–0.9)
MONOCYTES NFR BLD AUTO: 8.1 % — SIGNIFICANT CHANGE UP (ref 2–14)
NEUTROPHILS # BLD AUTO: 5.38 K/UL — SIGNIFICANT CHANGE UP (ref 1.8–7.4)
NEUTROPHILS NFR BLD AUTO: 62.6 % — SIGNIFICANT CHANGE UP (ref 43–77)
NRBC # BLD: 0 /100 WBCS — SIGNIFICANT CHANGE UP (ref 0–0)
PLATELET # BLD AUTO: 272 K/UL — SIGNIFICANT CHANGE UP (ref 150–400)
POTASSIUM SERPL-MCNC: 4.1 MMOL/L — SIGNIFICANT CHANGE UP (ref 3.5–5.3)
POTASSIUM SERPL-SCNC: 4.1 MMOL/L — SIGNIFICANT CHANGE UP (ref 3.5–5.3)
RBC # BLD: 4.18 M/UL — SIGNIFICANT CHANGE UP (ref 3.8–5.2)
RBC # FLD: 16.6 % — HIGH (ref 10.3–14.5)
SODIUM SERPL-SCNC: 150 MMOL/L — HIGH (ref 135–145)
WBC # BLD: 8.6 K/UL — SIGNIFICANT CHANGE UP (ref 3.8–10.5)
WBC # FLD AUTO: 8.6 K/UL — SIGNIFICANT CHANGE UP (ref 3.8–10.5)

## 2024-06-10 PROCEDURE — 99222 1ST HOSP IP/OBS MODERATE 55: CPT

## 2024-06-10 PROCEDURE — 74230 X-RAY XM SWLNG FUNCJ C+: CPT | Mod: 26

## 2024-06-10 PROCEDURE — 99233 SBSQ HOSP IP/OBS HIGH 50: CPT | Mod: FS

## 2024-06-10 RX ORDER — LABETALOL HCL 100 MG
200 TABLET ORAL EVERY 6 HOURS
Refills: 0 | Status: DISCONTINUED | OUTPATIENT
Start: 2024-06-10 | End: 2024-06-13

## 2024-06-10 RX ORDER — HYDRALAZINE HCL 50 MG
10 TABLET ORAL ONCE
Refills: 0 | Status: COMPLETED | OUTPATIENT
Start: 2024-06-10 | End: 2024-06-10

## 2024-06-10 RX ORDER — HYDRALAZINE HCL 50 MG
5 TABLET ORAL ONCE
Refills: 0 | Status: COMPLETED | OUTPATIENT
Start: 2024-06-10 | End: 2024-06-10

## 2024-06-10 RX ORDER — LABETALOL HCL 100 MG
200 TABLET ORAL EVERY 6 HOURS
Refills: 0 | Status: DISCONTINUED | OUTPATIENT
Start: 2024-06-10 | End: 2024-06-10

## 2024-06-10 RX ADMIN — PANTOPRAZOLE SODIUM 40 MILLIGRAM(S): 20 TABLET, DELAYED RELEASE ORAL at 12:52

## 2024-06-10 RX ADMIN — Medication 100 MILLIGRAM(S): at 12:51

## 2024-06-10 RX ADMIN — Medication 1 TABLET(S): at 05:25

## 2024-06-10 RX ADMIN — Medication 5 MILLIGRAM(S): at 05:25

## 2024-06-10 RX ADMIN — Medication 5 MILLIGRAM(S): at 13:04

## 2024-06-10 RX ADMIN — POLYETHYLENE GLYCOL 3350 17 GRAM(S): 17 POWDER, FOR SOLUTION ORAL at 12:51

## 2024-06-10 RX ADMIN — Medication 2: at 12:49

## 2024-06-10 RX ADMIN — Medication 200 MILLIGRAM(S): at 18:12

## 2024-06-10 RX ADMIN — ENOXAPARIN SODIUM 40 MILLIGRAM(S): 100 INJECTION SUBCUTANEOUS at 12:52

## 2024-06-10 RX ADMIN — Medication 5 MILLIGRAM(S): at 21:28

## 2024-06-10 RX ADMIN — Medication 10 MILLIGRAM(S): at 14:19

## 2024-06-10 RX ADMIN — INSULIN GLARGINE 5 UNIT(S): 100 INJECTION, SOLUTION SUBCUTANEOUS at 21:29

## 2024-06-10 RX ADMIN — Medication 650 MILLIGRAM(S): at 18:46

## 2024-06-10 RX ADMIN — GABAPENTIN 300 MILLIGRAM(S): 400 CAPSULE ORAL at 12:50

## 2024-06-10 RX ADMIN — ONDANSETRON 4 MILLIGRAM(S): 8 TABLET, FILM COATED ORAL at 11:42

## 2024-06-10 RX ADMIN — Medication 650 MILLIGRAM(S): at 18:11

## 2024-06-10 RX ADMIN — SENNA PLUS 2 TABLET(S): 8.6 TABLET ORAL at 21:29

## 2024-06-10 RX ADMIN — LOSARTAN POTASSIUM 100 MILLIGRAM(S): 100 TABLET, FILM COATED ORAL at 04:18

## 2024-06-10 RX ADMIN — Medication 1 TABLET(S): at 18:12

## 2024-06-10 RX ADMIN — Medication 5 MILLIGRAM(S): at 06:24

## 2024-06-10 RX ADMIN — ATORVASTATIN CALCIUM 10 MILLIGRAM(S): 80 TABLET, FILM COATED ORAL at 21:30

## 2024-06-10 NOTE — SWALLOW VFSS/MBS ASSESSMENT ADULT - ADDITIONAL INFORMATION
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 Pt required verbal cues intermittently to swallow oral residue and clear oral cavity prior to next intake. Pt preferred daughter to translate for Gujarati. Pt denied pain but c/o intermittent nausea this am.

## 2024-06-10 NOTE — SWALLOW VFSS/MBS ASSESSMENT ADULT - RECOMMENDED FEEDING/EATING TECHNIQUES
provide verbal cues intermittently to swallow oral residue and clear oral cavity prior to next intake./allow for swallow between intakes/check mouth frequently for oral residue/pocketing/maintain upright posture during/after eating for 30 mins/position upright (90 degrees)/provide rest periods between swallows

## 2024-06-10 NOTE — SWALLOW VFSS/MBS ASSESSMENT ADULT - ADDITIONAL RECOMMENDATIONS
Speech language and swallowing therapy post d/c; SLP to f/u on inpt basis 1-2 times per week; Pt will tolerate diet with no s/s of aspiration.

## 2024-06-10 NOTE — SWALLOW VFSS/MBS ASSESSMENT ADULT - LARYNGEAL PENETRATION AFTER THE SWALLOW - SILENT
with retrieval upon subsequent swallows Spillover of oral residue post swallow, with retrieval upon subsequent swallows/Trace

## 2024-06-10 NOTE — SWALLOW VFSS/MBS ASSESSMENT ADULT - ORAL PHASE COMMENTS
passive overflow of residue to level of valleculae and pyriform sinuses with delayed repeat swallow noted. Pt requesting sip of thin liquid to assist with oral clearing of hard, dry solid. Thin liquid cup sip wash provided.

## 2024-06-10 NOTE — SWALLOW VFSS/MBS ASSESSMENT ADULT - COMMENTS
83F on ASA81, Gujarati speaking, h/o DM, HTN, HLD, chronic LBP, p/f inability to move Rt side at 430AM today. 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 NIHSS of 7. ICH of 2. pre-MRS 1. CTA head negative for vascular malformations. 06/7: Stroke consult pending if CTH stable plan to transition to stroke. 06/8: Patient was started on Bactrim for a UTI, pending UCx. Per Neuro: Impression: R-sided hemiparesis, c/w L thalamic IPH with IVH. The most likely etiology is hypertensive.

## 2024-06-10 NOTE — PROGRESS NOTE ADULT - ASSESSMENT
83-year-old female with PMHx HTN, HLD, DM, spinal stenosis, who presents w/ CC of abnormal CTH at outside hospital, showing "acute left thalamocapsular / corona radiata parenchymal hemorrhage with associated intraventricular extension," associated with right hemiparesis.    IMPRESSION: Right hemiparesis, c/w L thalamic IPH with IVH. The most likely etiology is hypertensive.     NEURO: Neurologically improved since admission. Continue close monitoring for neurologic deterioration. Continue to monitor blood pressure. BP goal < 140/90. Physical therapy/OT: AR     ANTITHROMBOTIC THERAPY: IPH    PULMONARY: CXR clear, protecting airway, saturating well     CARDIOVASCULAR: TTE 6/5: EF 82%, no significant valvular disease, normal LA; c/w cardiac monitoring                              SBP goal: < 140/90    GASTROINTESTINAL: Oropharyngeal dysphagia on S/S evaluation; pending MBS     Diet: Pureed    RENAL: BUN & Cr elevated (44/0.89)     Na Goal: Greater than 135     Harden: NO, straight cath for residual urine    HEMATOLOGY: H/H relatively stable (11.2/36), platelets within normal limits     DVT ppx: Lovenox     ID: afebrile, no leukocytosis     OTHER: Discussed medication condition with family at bedside. Pending Jesu US r/o DVT    DISPOSITION: Acute inpatient rehab once stable for discharge      CORE MEASURES:        Admission NIHSS: 7     TPA: NO     LDL/HDL: 33/57     Depression Screen: N/A     Statin Therapy: YES      Dysphagia Screen: PASS     Smoking: NO      Afib: NO      Stroke Education: YES    Obtain screening lower extremity venous ultrasound in patients who meet 1 or more of the following criteria as patient is high risk for DVT/PE on admission:   [] History of DVT/PE  [] Hypercoagulable states (Factor V Leiden, Cancer, OCP, etc. )  [] Prolonged immobility (hemiplegia/hemiparesis/post operative or any other extended immobilization)  [] Transferred from outside facility (Rehab or Long term care)  [] Age </= to 50     83-year-old female with PMHx HTN, HLD, DM, spinal stenosis, who presents w/ CC of abnormal CTH at outside hospital, showing "acute left thalamocapsular / corona radiata parenchymal hemorrhage with associated intraventricular extension," associated with right hemiparesis.    IMPRESSION: Right hemiparesis, c/w L thalamic IPH with IVH. The most likely etiology is hypertensive.     NEURO: Neurologically improved since admission. Continue close monitoring for neurologic deterioration. Continue to monitor blood pressure. BP goal < 140/90. Physical therapy/OT: AR     ANTITHROMBOTIC THERAPY: IPH    PULMONARY: CXR clear 06/07, protecting airway, saturating well     CARDIOVASCULAR: TTE 6/5: EF 82%, no significant valvular disease, normal LA; c/w cardiac monitoring                              SBP goal: < 140/90    GASTROINTESTINAL: Oropharyngeal dysphagia on S/S evaluation; s/p MBS with upgrade in diet     Diet: soft and bite sized    RENAL: BUN & Cr elevated will continue to monitor     Na Goal: Greater than 135     Harden: NO, straight cath for residual urine    HEMATOLOGY: H/H stable, platelets within normal limits,  Jesu US: negative for DVT     DVT ppx: Lovenox     ID: afebrile, no leukocytosis     OTHER: Discussed plan with family at bedside.     DISPOSITION: Acute inpatient rehab, stable for discharge      CORE MEASURES:        Admission NIHSS: 7     TPA: NO     LDL/HDL: 33/57     Depression Screen: N/A     Statin Therapy: YES      Dysphagia Screen: PASS     Smoking: NO      Afib: NO      Stroke Education: YES    Obtain screening lower extremity venous ultrasound in patients who meet 1 or more of the following criteria as patient is high risk for DVT/PE on admission:   [] History of DVT/PE  [] Hypercoagulable states (Factor V Leiden, Cancer, OCP, etc. )  [] Prolonged immobility (hemiplegia/hemiparesis/post operative or any other extended immobilization)  [] Transferred from outside facility (Rehab or Long term care)  [] Age </= to 50

## 2024-06-10 NOTE — ADVANCED PRACTICE NURSE CONSULT - RECOMMEDATIONS
Impression:     right elbow hypopigmentation, cannot rule out a deep tissue injury present on admission     Recommendations:     1) turn and position q2 and PRN utilizing offloading assistive devices  2) routine pericare daily and PRN soiling  3) encourage optimal nutrition  4) waffle cushion when oob to chair  5) B/L LE complete cair air fluidized boots or emilia-lock pillow to offload heels/feet  6) triad protective barrier cream to B/L buttocks/sacrum daily and PRN soiling  7) incontinence management - consider external urinary catheter to divert urine from skin if incontinent  8) podiatry for treatment recommendations regarding feet- podiatry last saw on 6/6/24, read notes    Plan discussed with HENOK Jeff on unit    For questions/comments regarding the recommendations in this consult, please contact Zonia at [phone]. Thank you!

## 2024-06-10 NOTE — SWALLOW VFSS/MBS ASSESSMENT ADULT - NS SWALLOW VFSS REC ASPIR MON
*If evident, report to MD immediately/change of breathing pattern/cough/gurgly voice/fever/pneumonia/throat clearing/upper respiratory infection

## 2024-06-10 NOTE — SWALLOW VFSS/MBS ASSESSMENT ADULT - ORAL PHASE
mild/Delayed oral transit time/Reduced anterior - posterior transport/Residue in oral cavity Reduced anterior - posterior transport/Incomplete tongue to palate contact/Uncontrolled bolus / spillover in she-pharynx/Uncontrolled bolus / spillover in hypopharynx mild to moderate with residue vs piecemeal swallows/Delayed oral transit time/Reduced anterior - posterior transport/Residue in oral cavity mild/Delayed oral transit time/Reduced anterior - posterior transport/Residue in oral cavity/Incomplete tongue to palate contact

## 2024-06-10 NOTE — SWALLOW VFSS/MBS ASSESSMENT ADULT - ORAL PREPARATORY PHASE
mild reduced formation of bolus reduced bolus formation impaired mastication mildly prolonged mastication

## 2024-06-10 NOTE — ADVANCED PRACTICE NURSE CONSULT - REASON FOR CONSULT
Wound care consult initiated by RN to assess patient's skin for a possible elbow, heel, and left 3rd toe injury     History of Present Illness:   83F on ASA81, Gujarrati speaking, h/o DM, HTN, HLD, chronic LBP, p/f inability to move Rt side at 430AM today. 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 CTA negative. SBP in the 180s. Plts/coags wnl. Exam: EOV, awake, Ox3, brisk FC, Lt side 5/5, RUE 4-/5, RLE 3/5

## 2024-06-10 NOTE — ADVANCED PRACTICE NURSE CONSULT - ASSESSMENT
Patient encountered on 4 Cohen. When wound care RN arrived on unit, patient was found sitting on the edge of a low air loss pressure redistribution support surface style bed with PT at bedside attempting to get patient out of bed using the tiffany steady. Daughter at bedside, patient was alert and oriented and gave consent to skin consult. The wound care RN was able to visualize an area of hypopigmentation over right elbow, measuring approximately 3cm x 0.5cm x 0cm -  hypopigmentation is indicative of a prior skin injury that has since healed. A skin injury is more likely to occur in the same area as a prior skin injury due to the reduced tensile strength of the replaced tissue.

## 2024-06-10 NOTE — SWALLOW VFSS/MBS ASSESSMENT ADULT - DIAGNOSTIC IMPRESSIONS
Pt is an 82 y/o female admitted with Right hemiparesis, c/w L thalamic IPH with IVH who presents with dysphagia characterized by mild to moderate oral preparatory/oral stage deficits and mild pharyngeal stage deficits. Aspiration was not evident with any consistency presented although trace laryngeal penetration with retrieval was inconsistently evident with thin liquids. Passive overflow of oral residue to the pharynx was evident as spontaneous repeat swallow was judged to be delayed. Formation, control and transfer of the bolus were impaired. Disorders include: reduced lingual strength/ROM/Rate of motion, reduced BOT to posterior pharyngeal wall contact, delay in trigger of the swallow reflex, and reduced laryngeal closure.

## 2024-06-10 NOTE — PROGRESS NOTE ADULT - SUBJECTIVE AND OBJECTIVE BOX
THE PATIENT WAS SEEN AND EXAMINED BY ME WITH THE HOUSESTAFF AND STROKE TEAM DURING MORNING ROUNDS.     HPI:   84 YO F on ASA81, Gujarrati speaking, h/o DM, HTN, HLD, chronic LBP, p/f inability to move Rt side at 430AM today. 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 CTA negative. SBP in the 180s. Plts/coags wnl. Exam: EOV, awake, Ox3, brisk FC, Lt side 5/5, RUE 4-/5, RLE 3/5. Yesterday, patient seen by Speech & Swallow - "evidence of oropharyngeal dysphagia," recommending MBS. Repeat bilateral LE venous dopplers pending. On Bactrim for UTI; and straight cath protocol.      SUBJECTIVE: No events overnight.  No new neurologic complaints.      acetaminophen     Tablet .. 650 milliGRAM(s) Oral every 6 hours PRN  atorvastatin 10 milliGRAM(s) Oral at bedtime  baclofen 5 milliGRAM(s) Oral every 8 hours  chlorhexidine 4% Liquid 1 Application(s) Topical daily  enoxaparin Injectable 40 milliGRAM(s) SubCutaneous every 24 hours  gabapentin 300 milliGRAM(s) Oral daily  insulin glargine Injectable (LANTUS) 5 Unit(s) SubCutaneous at bedtime  insulin lispro (ADMELOG) corrective regimen sliding scale   SubCutaneous Before meals and at bedtime  labetalol 100 milliGRAM(s) Oral every 6 hours  losartan 100 milliGRAM(s) Oral daily  ondansetron Injectable 4 milliGRAM(s) IV Push every 6 hours PRN  pantoprazole  Injectable 40 milliGRAM(s) IV Push daily  phenazopyridine 100 milliGRAM(s) Oral every 8 hours PRN  polyethylene glycol 3350 17 Gram(s) Oral daily  senna 2 Tablet(s) Oral at bedtime  trimethoprim  160 mG/sulfamethoxazole 800 mG 1 Tablet(s) Oral two times a day      PHYSICAL EXAM:   Vital Signs Last 24 Hrs  T(C): 37 (09 Jun 2024 08:00), Max: 37 (09 Jun 2024 08:00)  T(F): 98.6 (09 Jun 2024 08:00), Max: 98.6 (09 Jun 2024 08:00)  HR: 57 (10 James 2024 06:32) (52 - 64)  BP: 150/67 (10 James 2024 06:32) (134/64 - 170/74)  BP(mean): 104 (10 James 2024 06:00) (96 - 122)  RR: 23 (10 James 2024 06:32) (20 - 28)  SpO2: 96% (10 James 2024 06:32) (94% - 97%)    Parameters below as of 10 James 2024 06:32  Patient On (Oxygen Delivery Method): nasal cannula  O2 Flow (L/min): 2      General: No acute distress  HEENT: EOM intact, visual fields full  Abdomen: Soft, nontender, nondistended   Extremities: No edema    NEUROLOGICAL EXAM:   Mental status: Awake and alert. Attentive to examiner. AOx2. No neglect. Follows simple commands.   Cranial Nerves: Mild right facial droop. Mild dysarthria. No aphasia.   Motor exam: Normal tone. RUE some effort 3/5, RLE some effort 4/5, with drift. 5/5 LUE, 5/5 LLE.   Sensation: Intact to light touch   Coordination/ Gait: Unable to asses at this time     LABS:                        11.2   8.60  )-----------( 272      ( 10 James 2024 05:29 )             36.5    06-10    150<H>  |  113<H>  |  53<H>  ----------------------------<  133<H>  4.1   |  23  |  0.90    Ca    9.7      10 James 2024 05:29  Phos  3.6     06-09  Mg     2.1     06-09          IMAGING: Reviewed by me.     CT Brain Stroke Protocol (06.05.24 @ 05:29)  COMPARISON: None.    PROCEDURE:  Noncontrast CT of the Head was performed from the skull base to the   vertex. Coronal and Sagittal reformats were obtained.    FINDINGS:    Acute 2.6 x 2.0 x 2.6 cm left thalamocapsular/corona radiata hemorrhage   with intraventricular extension within the lateral, third, and fourth   ventricles. Mild prominence of the ventricular system, which may be on   the basis of cerebral volume loss. Mild patchy hypodensities within the   periventricular and subcortical white matter, although nonspecific,   likely reflect chronic microvascular disease.    Paranasal sinuses are clear. Under pneumatized age and of the bilateral   mastoid air cells. Status post bilateral ocular lens replacement.   Calvarium is intact.    IMPRESSION:    Acute left thalamocapsular/corona radiata parenchymal hemorrhage with  associated intraventricular extension. Mild ventriculomegaly which may be   in the basis of cerebral volume loss.    CT Angio Brain Stroke Protocol  w/ IV Cont (06.05.24 @ 05:39)  IMPRESSION:    CTA Neck: No significant flow-limiting stenosis or evidence of acute   dissection within the cervical carotid or vertebral arteries.    CTA Head: No proximal large vessel occlusion, significant stenosis, or   evidence of intracranial aneurysm.    CT Perfusion: Nondiagnostic.      CT Head No Cont (06.07.24 @ 09:36)  IMPRESSION:  No significant interval change.    CT Head No Cont (06.05.24 @ 12:04)  IMPRESSION:  No interval change. THE PATIENT WAS SEEN AND EXAMINED BY ME WITH THE HOUSESTAFF AND STROKE TEAM DURING MORNING ROUNDS.   HPI:   84 YO F on ASA81, Gujarrati speaking, h/o DM, HTN, HLD, chronic LBP, p/f inability to move Rt side at 430AM today. 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 CTA negative. SBP in the 180s. Plts/coags wnl. Exam: EOV, awake, Ox3, brisk FC, Lt side 5/5, RUE 4-/5, RLE 3/5. Yesterday, patient seen by Speech & Swallow - "evidence of oropharyngeal dysphagia," recommending MBS. Repeat bilateral LE venous dopplers pending. On Bactrim for UTI; and straight cath protocol.      SUBJECTIVE: No events overnight.  No new neurologic complaints.      acetaminophen     Tablet .. 650 milliGRAM(s) Oral every 6 hours PRN  atorvastatin 10 milliGRAM(s) Oral at bedtime  baclofen 5 milliGRAM(s) Oral every 8 hours  chlorhexidine 4% Liquid 1 Application(s) Topical daily  enoxaparin Injectable 40 milliGRAM(s) SubCutaneous every 24 hours  gabapentin 300 milliGRAM(s) Oral daily  insulin glargine Injectable (LANTUS) 5 Unit(s) SubCutaneous at bedtime  insulin lispro (ADMELOG) corrective regimen sliding scale   SubCutaneous Before meals and at bedtime  labetalol 100 milliGRAM(s) Oral every 6 hours  losartan 100 milliGRAM(s) Oral daily  ondansetron Injectable 4 milliGRAM(s) IV Push every 6 hours PRN  pantoprazole  Injectable 40 milliGRAM(s) IV Push daily  phenazopyridine 100 milliGRAM(s) Oral every 8 hours PRN  polyethylene glycol 3350 17 Gram(s) Oral daily  senna 2 Tablet(s) Oral at bedtime  trimethoprim  160 mG/sulfamethoxazole 800 mG 1 Tablet(s) Oral two times a day      PHYSICAL EXAM:   Vital Signs Last 24 Hrs  T(C): 37 (09 Jun 2024 08:00), Max: 37 (09 Jun 2024 08:00)  T(F): 98.6 (09 Jun 2024 08:00), Max: 98.6 (09 Jun 2024 08:00)  HR: 57 (10 James 2024 06:32) (52 - 64)  BP: 150/67 (10 James 2024 06:32) (134/64 - 170/74)  BP(mean): 104 (10 James 2024 06:00) (96 - 122)  RR: 23 (10 James 2024 06:32) (20 - 28)  SpO2: 96% (10 James 2024 06:32) (94% - 97%)    Parameters below as of 10 James 2024 06:32  Patient On (Oxygen Delivery Method): nasal cannula  O2 Flow (L/min): 2      General: No acute distress  HEENT: EOM intact, visual fields full  Abdomen: Soft, nontender, nondistended   Extremities: No edema    NEUROLOGICAL EXAM:   Mental status: Awake and alert. Attentive to examiner. AOx2. No neglect. Follows simple commands.   Cranial Nerves: Mild right facial droop. Mild dysarthria. No aphasia.   Motor exam: Normal tone. RUE some effort 3/5, RLE some effort 4/5, with drift. 5/5 LUE, 5/5 LLE.   Sensation: Intact to light touch   Coordination/ Gait: Unable to asses at this time     LABS:                        11.2   8.60  )-----------( 272      ( 10 James 2024 05:29 )             36.5    06-10    150<H>  |  113<H>  |  53<H>  ----------------------------<  133<H>  4.1   |  23  |  0.90    Ca    9.7      10 James 2024 05:29  Phos  3.6     06-09  Mg     2.1     06-09          IMAGING: Reviewed by me.     CT Brain Stroke Protocol (06.05.24 @ 05:29)  COMPARISON: None.    PROCEDURE:  Noncontrast CT of the Head was performed from the skull base to the   vertex. Coronal and Sagittal reformats were obtained.    FINDINGS:    Acute 2.6 x 2.0 x 2.6 cm left thalamocapsular/corona radiata hemorrhage   with intraventricular extension within the lateral, third, and fourth   ventricles. Mild prominence of the ventricular system, which may be on   the basis of cerebral volume loss. Mild patchy hypodensities within the   periventricular and subcortical white matter, although nonspecific,   likely reflect chronic microvascular disease.    Paranasal sinuses are clear. Under pneumatized age and of the bilateral   mastoid air cells. Status post bilateral ocular lens replacement.   Calvarium is intact.    IMPRESSION:    Acute left thalamocapsular/corona radiata parenchymal hemorrhage with  associated intraventricular extension. Mild ventriculomegaly which may be   in the basis of cerebral volume loss.    CT Angio Brain Stroke Protocol  w/ IV Cont (06.05.24 @ 05:39)  IMPRESSION:    CTA Neck: No significant flow-limiting stenosis or evidence of acute   dissection within the cervical carotid or vertebral arteries.    CTA Head: No proximal large vessel occlusion, significant stenosis, or   evidence of intracranial aneurysm.    CT Perfusion: Nondiagnostic.      CT Head No Cont (06.07.24 @ 09:36)  IMPRESSION:  No significant interval change.    CT Head No Cont (06.05.24 @ 12:04)  IMPRESSION:  No interval change.

## 2024-06-10 NOTE — SWALLOW VFSS/MBS ASSESSMENT ADULT - PHARYNGEAL PHASE COMMENTS
delayed spontaneous repeat swallow Trace laryngeal penetration noted on thin liquid cup sip following solid trial, upon Pt request; Penetrated material appeared to be retrieved.

## 2024-06-10 NOTE — CHART NOTE - NSCHARTNOTEFT_GEN_A_CORE
Wound Care Team Note:    Request for wound care consult for foot/toe wound received and referred to podiatry. Will defer to podiatry for management. Please contact Podiatry for questions/concerns. Will not follow.    Lillian Rodriguez NP-C, CWOCN via TEAMS

## 2024-06-10 NOTE — SWALLOW VFSS/MBS ASSESSMENT ADULT - MODE OF PRESENTATION
spoon/self fed/fed by clinician cup/spoon/fed by clinician fed by clinician self fed/fed by clinician

## 2024-06-11 ENCOUNTER — TRANSCRIPTION ENCOUNTER (OUTPATIENT)
Age: 84
End: 2024-06-11

## 2024-06-11 LAB
ANION GAP SERPL CALC-SCNC: 13 MMOL/L — SIGNIFICANT CHANGE UP (ref 5–17)
BASOPHILS # BLD AUTO: 0.05 K/UL — SIGNIFICANT CHANGE UP (ref 0–0.2)
BASOPHILS NFR BLD AUTO: 0.6 % — SIGNIFICANT CHANGE UP (ref 0–2)
BUN SERPL-MCNC: 54 MG/DL — HIGH (ref 7–23)
CALCIUM SERPL-MCNC: 9.6 MG/DL — SIGNIFICANT CHANGE UP (ref 8.4–10.5)
CHLORIDE SERPL-SCNC: 112 MMOL/L — HIGH (ref 96–108)
CO2 SERPL-SCNC: 24 MMOL/L — SIGNIFICANT CHANGE UP (ref 22–31)
CREAT SERPL-MCNC: 1 MG/DL — SIGNIFICANT CHANGE UP (ref 0.5–1.3)
EGFR: 57 ML/MIN/1.73M2 — LOW
EOSINOPHIL # BLD AUTO: 0.07 K/UL — SIGNIFICANT CHANGE UP (ref 0–0.5)
EOSINOPHIL NFR BLD AUTO: 0.9 % — SIGNIFICANT CHANGE UP (ref 0–6)
GLUCOSE BLDC GLUCOMTR-MCNC: 118 MG/DL — HIGH (ref 70–99)
GLUCOSE BLDC GLUCOMTR-MCNC: 139 MG/DL — HIGH (ref 70–99)
GLUCOSE BLDC GLUCOMTR-MCNC: 144 MG/DL — HIGH (ref 70–99)
GLUCOSE BLDC GLUCOMTR-MCNC: 146 MG/DL — HIGH (ref 70–99)
GLUCOSE SERPL-MCNC: 130 MG/DL — HIGH (ref 70–99)
HCT VFR BLD CALC: 36.1 % — SIGNIFICANT CHANGE UP (ref 34.5–45)
HGB BLD-MCNC: 11.3 G/DL — LOW (ref 11.5–15.5)
IMM GRANULOCYTES NFR BLD AUTO: 0.4 % — SIGNIFICANT CHANGE UP (ref 0–0.9)
LYMPHOCYTES # BLD AUTO: 2.19 K/UL — SIGNIFICANT CHANGE UP (ref 1–3.3)
LYMPHOCYTES # BLD AUTO: 26.9 % — SIGNIFICANT CHANGE UP (ref 13–44)
MCHC RBC-ENTMCNC: 26.5 PG — LOW (ref 27–34)
MCHC RBC-ENTMCNC: 31.3 GM/DL — LOW (ref 32–36)
MCV RBC AUTO: 84.5 FL — SIGNIFICANT CHANGE UP (ref 80–100)
MONOCYTES # BLD AUTO: 0.65 K/UL — SIGNIFICANT CHANGE UP (ref 0–0.9)
MONOCYTES NFR BLD AUTO: 8 % — SIGNIFICANT CHANGE UP (ref 2–14)
NEUTROPHILS # BLD AUTO: 5.16 K/UL — SIGNIFICANT CHANGE UP (ref 1.8–7.4)
NEUTROPHILS NFR BLD AUTO: 63.2 % — SIGNIFICANT CHANGE UP (ref 43–77)
NRBC # BLD: 0 /100 WBCS — SIGNIFICANT CHANGE UP (ref 0–0)
PLATELET # BLD AUTO: 264 K/UL — SIGNIFICANT CHANGE UP (ref 150–400)
POTASSIUM SERPL-MCNC: 4.1 MMOL/L — SIGNIFICANT CHANGE UP (ref 3.5–5.3)
POTASSIUM SERPL-SCNC: 4.1 MMOL/L — SIGNIFICANT CHANGE UP (ref 3.5–5.3)
RBC # BLD: 4.27 M/UL — SIGNIFICANT CHANGE UP (ref 3.8–5.2)
RBC # FLD: 16.4 % — HIGH (ref 10.3–14.5)
SODIUM SERPL-SCNC: 149 MMOL/L — HIGH (ref 135–145)
WBC # BLD: 8.15 K/UL — SIGNIFICANT CHANGE UP (ref 3.8–10.5)
WBC # FLD AUTO: 8.15 K/UL — SIGNIFICANT CHANGE UP (ref 3.8–10.5)

## 2024-06-11 PROCEDURE — 99233 SBSQ HOSP IP/OBS HIGH 50: CPT | Mod: FS

## 2024-06-11 PROCEDURE — 99232 SBSQ HOSP IP/OBS MODERATE 35: CPT

## 2024-06-11 PROCEDURE — 70450 CT HEAD/BRAIN W/O DYE: CPT | Mod: 26

## 2024-06-11 RX ORDER — BACLOFEN 100 %
1 POWDER (GRAM) MISCELLANEOUS
Qty: 0 | Refills: 0 | DISCHARGE
Start: 2024-06-11

## 2024-06-11 RX ORDER — ACETAMINOPHEN 500 MG
2 TABLET ORAL
Qty: 0 | Refills: 0 | DISCHARGE
Start: 2024-06-11

## 2024-06-11 RX ORDER — IPRATROPIUM/ALBUTEROL SULFATE 18-103MCG
3 AEROSOL WITH ADAPTER (GRAM) INHALATION EVERY 6 HOURS
Refills: 0 | Status: DISCONTINUED | OUTPATIENT
Start: 2024-06-11 | End: 2024-06-13

## 2024-06-11 RX ORDER — HYDRALAZINE HCL 50 MG
10 TABLET ORAL ONCE
Refills: 0 | Status: COMPLETED | OUTPATIENT
Start: 2024-06-11 | End: 2024-06-11

## 2024-06-11 RX ORDER — PHENAZOPYRIDINE HCL 100 MG
1 TABLET ORAL
Qty: 0 | Refills: 0 | DISCHARGE
Start: 2024-06-11

## 2024-06-11 RX ORDER — SODIUM CHLORIDE 9 MG/ML
4 INJECTION INTRAMUSCULAR; INTRAVENOUS; SUBCUTANEOUS EVERY 12 HOURS
Refills: 0 | Status: DISCONTINUED | OUTPATIENT
Start: 2024-06-11 | End: 2024-06-13

## 2024-06-11 RX ORDER — INSULIN LISPRO 100/ML
0 VIAL (ML) SUBCUTANEOUS
Qty: 0 | Refills: 0 | DISCHARGE
Start: 2024-06-11

## 2024-06-11 RX ORDER — ENOXAPARIN SODIUM 100 MG/ML
0 INJECTION SUBCUTANEOUS
Qty: 0 | Refills: 0 | DISCHARGE
Start: 2024-06-11

## 2024-06-11 RX ORDER — SENNA PLUS 8.6 MG/1
2 TABLET ORAL
Qty: 0 | Refills: 0 | DISCHARGE
Start: 2024-06-11

## 2024-06-11 RX ORDER — BACLOFEN 100 %
1 POWDER (GRAM) MISCELLANEOUS
Refills: 0 | DISCHARGE

## 2024-06-11 RX ORDER — LOSARTAN POTASSIUM 100 MG/1
1 TABLET, FILM COATED ORAL
Refills: 0 | DISCHARGE

## 2024-06-11 RX ORDER — POLYETHYLENE GLYCOL 3350 17 G/17G
17 POWDER, FOR SOLUTION ORAL
Qty: 0 | Refills: 0 | DISCHARGE
Start: 2024-06-11

## 2024-06-11 RX ORDER — METFORMIN HYDROCHLORIDE 850 MG/1
1 TABLET ORAL
Refills: 0 | DISCHARGE

## 2024-06-11 RX ORDER — LABETALOL HCL 100 MG
1 TABLET ORAL
Qty: 0 | Refills: 0 | DISCHARGE
Start: 2024-06-11

## 2024-06-11 RX ORDER — LOSARTAN POTASSIUM 100 MG/1
1 TABLET, FILM COATED ORAL
Qty: 0 | Refills: 0 | DISCHARGE
Start: 2024-06-11

## 2024-06-11 RX ORDER — ASPIRIN/CALCIUM CARB/MAGNESIUM 324 MG
1 TABLET ORAL
Refills: 0 | DISCHARGE

## 2024-06-11 RX ADMIN — Medication 1 TABLET(S): at 17:28

## 2024-06-11 RX ADMIN — Medication 200 MILLIGRAM(S): at 12:18

## 2024-06-11 RX ADMIN — SODIUM CHLORIDE 4 MILLILITER(S): 9 INJECTION INTRAMUSCULAR; INTRAVENOUS; SUBCUTANEOUS at 17:12

## 2024-06-11 RX ADMIN — Medication 5 MILLIGRAM(S): at 05:39

## 2024-06-11 RX ADMIN — ATORVASTATIN CALCIUM 10 MILLIGRAM(S): 80 TABLET, FILM COATED ORAL at 21:24

## 2024-06-11 RX ADMIN — Medication 200 MILLIGRAM(S): at 06:04

## 2024-06-11 RX ADMIN — Medication 5 MILLIGRAM(S): at 21:24

## 2024-06-11 RX ADMIN — PANTOPRAZOLE SODIUM 40 MILLIGRAM(S): 20 TABLET, DELAYED RELEASE ORAL at 12:18

## 2024-06-11 RX ADMIN — SENNA PLUS 2 TABLET(S): 8.6 TABLET ORAL at 21:24

## 2024-06-11 RX ADMIN — POLYETHYLENE GLYCOL 3350 17 GRAM(S): 17 POWDER, FOR SOLUTION ORAL at 12:18

## 2024-06-11 RX ADMIN — Medication 10 MILLIGRAM(S): at 12:18

## 2024-06-11 RX ADMIN — Medication 1 TABLET(S): at 05:39

## 2024-06-11 RX ADMIN — Medication 5 MILLIGRAM(S): at 14:47

## 2024-06-11 RX ADMIN — LOSARTAN POTASSIUM 100 MILLIGRAM(S): 100 TABLET, FILM COATED ORAL at 05:39

## 2024-06-11 RX ADMIN — ONDANSETRON 4 MILLIGRAM(S): 8 TABLET, FILM COATED ORAL at 12:17

## 2024-06-11 RX ADMIN — INSULIN GLARGINE 5 UNIT(S): 100 INJECTION, SOLUTION SUBCUTANEOUS at 21:25

## 2024-06-11 RX ADMIN — Medication 200 MILLIGRAM(S): at 17:28

## 2024-06-11 RX ADMIN — ENOXAPARIN SODIUM 40 MILLIGRAM(S): 100 INJECTION SUBCUTANEOUS at 14:48

## 2024-06-11 RX ADMIN — GABAPENTIN 300 MILLIGRAM(S): 400 CAPSULE ORAL at 12:18

## 2024-06-11 NOTE — PROGRESS NOTE ADULT - SUBJECTIVE AND OBJECTIVE BOX
family/visitor at bedside, able to interpret  patient complains of stomach upset, no BM x days   decreased appetite, limited oral intake     REVIEW OF SYSTEMS  Constitutional - No fever,  No fatigue  HEENT - No vertigo, No neck pain  Neurological - + headache    FUNCTIONAL PROGRESS  SLP  MBS 6/10  dysphagia  soft and bite sized, thin liquids    PT 6/11  bed mobility mod assist   sat EOB with min A/CGA  refusing OOB to chair     OT 6/10  bed mobility mod assist   follows simple commands     VITALS  T(C): 36.5 (06-10-24 @ 20:00), Max: 36.5 (06-10-24 @ 20:00)  HR: 54 (06-11-24 @ 08:00) (50 - 62)  BP: 151/70 (06-11-24 @ 08:00) (104/53 - 182/81)  RR: 27 (06-11-24 @ 08:00) (20 - 30)  SpO2: 99% (06-11-24 @ 08:00) (91% - 100%)  Wt(kg): --    MEDICATIONS   acetaminophen     Tablet .. 650 milliGRAM(s) every 6 hours PRN  albuterol/ipratropium for Nebulization 3 milliLiter(s) every 6 hours PRN  atorvastatin 10 milliGRAM(s) at bedtime  baclofen 5 milliGRAM(s) every 8 hours  enoxaparin Injectable 40 milliGRAM(s) every 24 hours  gabapentin 300 milliGRAM(s) daily  guaiFENesin Oral Liquid (Sugar-Free) 100 milliGRAM(s) every 6 hours PRN  insulin glargine Injectable (LANTUS) 5 Unit(s) at bedtime  insulin lispro (ADMELOG) corrective regimen sliding scale   Before meals and at bedtime  labetalol 200 milliGRAM(s) every 6 hours  losartan 100 milliGRAM(s) daily  ondansetron Injectable 4 milliGRAM(s) every 6 hours PRN  pantoprazole  Injectable 40 milliGRAM(s) daily  phenazopyridine 100 milliGRAM(s) every 8 hours PRN  polyethylene glycol 3350 17 Gram(s) daily  senna 2 Tablet(s) at bedtime  sodium chloride 3%  Inhalation 4 milliLiter(s) every 12 hours  trimethoprim  160 mG/sulfamethoxazole 800 mG 1 Tablet(s) two times a day      RECENT LABS - Reviewed                        11.3   8.15  )-----------( 264      ( 11 Jun 2024 05:06 )             36.1     06-11    149<H>  |  112<H>  |  54<H>  ----------------------------<  130<H>  4.1   |  24  |  1.00    Ca    9.6      11 Jun 2024 05:05        Urinalysis Basic - ( 11 Jun 2024 05:05 )    Color: x / Appearance: x / SG: x / pH: x  Gluc: 130 mg/dL / Ketone: x  / Bili: x / Urobili: x   Blood: x / Protein: x / Nitrite: x   Leuk Esterase: x / RBC: x / WBC x   Sq Epi: x / Non Sq Epi: x / Bacteria: x    < from: CT Head No Cont (06.07.24 @ 09:36) >    INTERPRETATION:  CLINICAL INFORMATION: Intracranial hemorrhage. Follow-up.    COMPARISON: CTs of the head 6/5/2024.    CONTRAST:  IV Contrast: None  Complications: None    TECHNIQUE:  Serial axial images were obtained from the skull base to the   vertex with portable Cere[employer] scanner.    FINDINGS:    VENTRICLES AND SULCI: Evolving intraventricular hemorrhage within the   bilateral lateral ventricles, third ventricle, and fourth ventricle which   appears similar in size as compared with 6/5/2024. Mildly dilated   ventricular system, similar as compared with 6/5/2024.  INTRA-AXIAL: Stable acute left thalamic capsular/corona radiata   hemorrhage with adjacent edema. No midline shift. There are   periventricular and subcortical white matter hypodensities, consistent   with microvascular type changes.  EXTRA-AXIAL: No mass or fluid collection. Basal cisterns are normal in   appearance.    VISUALIZED SINUSES:  Clear.  TYMPANOMASTOID CAVITIES:  Clear.  VISUALIZED ORBITS: Bilateral lens replacement.  CALVARIUM: Intact.    MISCELLANEOUS: None.      IMPRESSION:  No significant interval change.      < end of copied text >        ----------------------------------------------------------------------------------------  PHYSICAL EXAM  Constitutional - NAD, Comfortable, in bed   Chest - Breathing comfortably +O2 by NC at 2 L  Cardiovascular - S1S2   Extremities - No C/C/E, No calf tenderness   Neurologic Exam -    follows commands                 Cognitive - awake, alert     Communication -responds to questions        Motor - moves all ext right sided weakness      Sensory - Intact to LT     Psychiatric - Mood stable, Affect WNL  ----------------------------------------------------------------------------------------  ASSESSMENT/PLAN  83yFemale h/o HTN, DM spinal stenosis, with functional deficits after IPH  CT with stable hemorrhage   dysphagia, s/p MBS, soft and bite sized with thin liquids   decreased oral intake  urinary retention, continue to monitor, st cath as needed   bowel regimen  Pain - Tylenol baclofen, gabapentin   DVT PPX - SCDs lovenox   Rehab - Will continue to follow for ongoing rehab needs and recommendations.    continue bedside therapy, PT/OT/SLP   Recommend ACUTE inpatient rehabilitation for the functional deficits consisting of 3 hours of therapy/day x 2-4 weeks depending on progress at rehabilitation facility, 24 hour RN/daily PMR physician for comorbid medical management. Patient will be able to tolerate 3 hours a day.                35 minutes spent on total encounter  with chart review of PT/OT/SLP notes, exam, imaging, counseling and education on inpatient rehabilitation, coordination of care with rehab team and

## 2024-06-11 NOTE — PROGRESS NOTE ADULT - ASSESSMENT
83-year-old female with PMHx HTN, HLD, DM, spinal stenosis, who presents w/ CC of abnormal CTH at outside hospital, showing "acute left thalamocapsular / corona radiata parenchymal hemorrhage with associated intraventricular extension," associated with right hemiparesis.    IMPRESSION: Right hemiparesis, c/w L thalamic IPH with IVH. The most likely etiology is hypertensive.     NEURO: Neurologically improved since admission. Continue close monitoring for neurologic deterioration. Continue to monitor blood pressure. BP goal < 140/90. Physical therapy/OT: AR     ANTITHROMBOTIC THERAPY: none in the setting of IPH    PULMONARY: CXR clear 06/07, protecting airway, saturating well     CARDIOVASCULAR: TTE 6/5: EF 82%, no significant valvular disease, normal LA; c/w cardiac monitoring                              SBP goal: < 140/90    GASTROINTESTINAL: Oropharyngeal dysphagia on S/S evaluation; s/p MBS with upgrade in diet     Diet: soft and bite sized    RENAL: BUN & Cr elevated will continue to monitor     Na Goal: Greater than 135     Harden: NO, straight cath for residual urine    HEMATOLOGY: H/H stable, platelets 264,  Jesu US: negative for DVT     DVT ppx: Lovenox     ID: afebrile, no leukocytosis     OTHER: Discussed plan with family at bedside.     DISPOSITION: Acute inpatient rehab, stable for discharge      CORE MEASURES:        Admission NIHSS: 7     TPA: NO     LDL/HDL: 33/57     Depression Screen: N/A     Statin Therapy: YES      Dysphagia Screen: PASS     Smoking: NO      Afib: NO      Stroke Education: YES    Obtain screening lower extremity venous ultrasound in patients who meet 1 or more of the following criteria as patient is high risk for DVT/PE on admission:   [] History of DVT/PE  [] Hypercoagulable states (Factor V Leiden, Cancer, OCP, etc. )  [] Prolonged immobility (hemiplegia/hemiparesis/post operative or any other extended immobilization)  [] Transferred from outside facility (Rehab or Long term care)  [] Age </= to 50       83-year-old female with PMHx HTN, HLD, DM, spinal stenosis, who presents w/ CC of abnormal CTH at outside hospital, showing "acute left thalamocapsular / corona radiata parenchymal hemorrhage with associated intraventricular extension," associated with right hemiparesis.    IMPRESSION: Right hemiparesis, c/w L thalamic IPH with IVH. The most likely etiology is hypertensive.     NEURO: Neurologically improved since admission. Continue close monitoring for neurologic deterioration. Continue to monitor blood pressure. BP goal < 140/90. Physical therapy/OT: AR     ANTITHROMBOTIC THERAPY: none in the setting of IPH    PULMONARY: CXR clear 06/07, protecting airway, saturating well     CARDIOVASCULAR: TTE 6/5: EF 82%, no significant valvular disease, normal LA; c/w cardiac monitoring                              SBP goal: < 140/90    GASTROINTESTINAL: Oropharyngeal dysphagia on S/S evaluation; s/p MBS with upgrade in diet     Diet: soft and bite sized    RENAL: BUN & Cr elevated will continue to monitor     Na Goal: Greater than 135     Harden: NO, straight cath for residual urine    HEMATOLOGY: H/H stable, platelets 264,  Jesu US: negative for DVT     DVT ppx: Lovenox     ID: afebrile, no leukocytosis     OTHER: Discussed plan with family at bedside.  Na slowly downtrending.      DISPOSITION: Acute inpatient rehab, stable for discharge      CORE MEASURES:        Admission NIHSS: 7     TPA: NO     LDL/HDL: 33/57     Depression Screen: N/A     Statin Therapy: YES      Dysphagia Screen: PASS     Smoking: NO      Afib: NO      Stroke Education: YES    Obtain screening lower extremity venous ultrasound in patients who meet 1 or more of the following criteria as patient is high risk for DVT/PE on admission:   [] History of DVT/PE  [] Hypercoagulable states (Factor V Leiden, Cancer, OCP, etc. )  [] Prolonged immobility (hemiplegia/hemiparesis/post operative or any other extended immobilization)  [] Transferred from outside facility (Rehab or Long term care)  [] Age </= to 50

## 2024-06-11 NOTE — DISCHARGE NOTE PROVIDER - CARE PROVIDERS DIRECT ADDRESSES
,[email or web address]

## 2024-06-11 NOTE — DISCHARGE NOTE PROVIDER - EXTENDED VTE YES NO FOR MLM ENOXAPARIN
Subjective   Patient ID [id number]   Alexandra Parada is a 33 y.o.  at 39w4d with a working estimated date of delivery of 2024, by Last Menstrual Period who presents for a routine prenatal visit. She denies vaginal bleeding, leakage of fluid, decreased fetal movements, or contractions.    Her pregnancy is complicated by:  Anemia, on iron  Infleunza, last week, feeling  better    Objective   Physical Exam:   Weight: 78.9 kg (174 lb)    Pregravid BMI: 25.84      BP: 122/78  Fetal Heart Rate: 140 Fundal Height (cm): 39 cm Presentation: Vertex  Dilation: 1 Effacement (%): 70 Fetal Station: -1    Prenatal Labs  Urine Dip  Results for orders placed or performed in visit on 24   POCT UA Automated manually resulted   Result Value Ref Range    POC Color, Urine Yellow Straw, Yellow, Light-Yellow    POC Appearance, Urine Clear Clear    POC Glucose, Urine NEGATIVE NEGATIVE mg/dl    POC Bilirubin, Urine NEGATIVE NEGATIVE    POC Ketones, Urine NEGATIVE NEGATIVE mg/dl    POC Specific Gravity, Urine 1.010 1.005 - 1.035    POC Blood, Urine NEGATIVE NEGATIVE    POC PH, Urine 6.5 No Reference Range Established PH    POC Protein, Urine NEGATIVE NEGATIVE, 30 (1+) mg/dl    POC Urobilinogen, Urine 0.2 0.2, 1.0 EU/DL    Poc Nitrite, Urine NEGATIVE NEGATIVE    POC Leukocytes, Urine NEGATIVE NEGATIVE         Imaging  Rev;d    Assessment/Plan   Problem List Items Addressed This Visit             ICD-10-CM    Supervision of other normal pregnancy, antepartum - Primary Z34.80    Relevant Orders    POCT UA Automated manually resulted (Completed)    Labor Induction     Other Visit Diagnoses         Codes    39 weeks gestation of pregnancy     Z3A.39            Continue prenatal vitamin.  Labs reviewed.  GBS neg  Expected mode of delivery ; IOL 24 at 630 am, likely need cytotec x3 then pitocin  Follow up in 1 week for a routine prenatal visit.  
,

## 2024-06-11 NOTE — PROGRESS NOTE ADULT - SUBJECTIVE AND OBJECTIVE BOX
Patient seen and examined at bedside.    --Anticoagulation--  enoxaparin Injectable 40 milliGRAM(s) SubCutaneous every 24 hours    T(C): 36.5 (06-10-24 @ 20:00), Max: 36.5 (06-10-24 @ 08:00)  HR: 55 (06-11-24 @ 04:00) (50 - 62)  BP: 155/68 (06-11-24 @ 04:00) (104/53 - 182/81)  RR: 20 (06-11-24 @ 04:00) (20 - 30)  SpO2: 96% (06-11-24 @ 04:00) (91% - 97%)  Wt(kg): --    Exam: Ox3, PERRL, R facial, dysarthric. FC, Left side 5/5, R side 4/5 RUE drift , R neglect

## 2024-06-11 NOTE — DISCHARGE NOTE PROVIDER - NSDCHC_MEDRECSTATUS_GEN_ALL_CORE
Facial  (Pediatric) Admission Reconciliation is Completed  Discharge Reconciliation is Not Complete Admission Reconciliation is Completed  Discharge Reconciliation is Completed

## 2024-06-11 NOTE — DISCHARGE NOTE PROVIDER - CARE PROVIDER_API CALL
Alicia Black  NP in Family Health  [address], [address]  [place] [address]  Phone: [phone]  Fax: [phone]  Follow Up Time: 2 weeks   Alicia Black  NP in Family Health  [address], [address]  [place] [address]  Phone: [phone]  Fax: [phone]  Follow Up Time: 2 weeks    Ruben Talamantes  [address], [address]  [place] [address]  Phone: [phone]  Fax: [phone]  Follow Up Time:

## 2024-06-11 NOTE — PROGRESS NOTE ADULT - SUBJECTIVE AND OBJECTIVE BOX
THE PATIENT WAS SEEN AND EXAMINED BY ME WITH THE HOUSESTAFF AND STROKE TEAM DURING MORNING ROUNDS.   HPI: 83F on ASA81, Gujarrati speaking, h/o DM, HTN, HLD, chronic LBP, p/f inability to move Rt side at 430AM today. 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 CTA negative. SBP in the 180s. Plts/coags wnl. Exam: EOV, awake, Ox3, brisk FC, Lt side 5/5, RUE 4-/5, RLE 3/5  (05 Jun 2024 07:26)    SUBJECTIVE: No events overnight.  No new neurologic complaints.      acetaminophen     Tablet .. 650 milliGRAM(s) Oral every 6 hours PRN  atorvastatin 10 milliGRAM(s) Oral at bedtime  baclofen 5 milliGRAM(s) Oral every 8 hours  enoxaparin Injectable 40 milliGRAM(s) SubCutaneous every 24 hours  gabapentin 300 milliGRAM(s) Oral daily  insulin glargine Injectable (LANTUS) 5 Unit(s) SubCutaneous at bedtime  insulin lispro (ADMELOG) corrective regimen sliding scale   SubCutaneous Before meals and at bedtime  labetalol 200 milliGRAM(s) Oral every 6 hours  losartan 100 milliGRAM(s) Oral daily  ondansetron Injectable 4 milliGRAM(s) IV Push every 6 hours PRN  pantoprazole  Injectable 40 milliGRAM(s) IV Push daily  phenazopyridine 100 milliGRAM(s) Oral every 8 hours PRN  polyethylene glycol 3350 17 Gram(s) Oral daily  senna 2 Tablet(s) Oral at bedtime  trimethoprim  160 mG/sulfamethoxazole 800 mG 1 Tablet(s) Oral two times a day    PHYSICAL EXAM:   Vital Signs Last 24 Hrs  T(C): 36.5 (10 James 2024 20:00), Max: 36.5 (10 James 2024 08:00)  T(F): 97.7 (10 Jmaes 2024 20:00), Max: 97.7 (10 James 2024 08:00)  HR: 58 (11 Jun 2024 06:00) (50 - 62)  BP: 146/64 (11 Jun 2024 06:00) (104/53 - 182/81)  BP(mean): 98 (11 Jun 2024 04:00) (77 - 117)  RR: 22 (11 Jun 2024 06:00) (20 - 30)  SpO2: 100% (11 Jun 2024 06:00) (91% - 100%)    Parameters below as of 11 Jun 2024 06:00  Patient On (Oxygen Delivery Method): nasal cannula  O2 Flow (L/min): 2    General: No acute distress  HEENT: EOM intact, visual fields full  Abdomen: Soft, nontender, nondistended   Extremities: No edema    NEUROLOGICAL EXAM:   Mental status: Awake and alert. Attentive to examiner. AOx2. No neglect. Follows simple commands.   Cranial Nerves: Mild right facial droop. Mild dysarthria. No aphasia.   Motor exam: Normal tone. RUE some effort 3/5, RLE some effort 4/5, with drift. 5/5 LUE, 5/5 LLE.   Sensation: Intact to light touch   Coordination/ Gait: Unable to asses at this time       LABS:                        11.3   8.15  )-----------( 264      ( 11 Jun 2024 05:06 )             36.1    06-11    149<H>  |  112<H>  |  54<H>  ----------------------------<  130<H>  4.1   |  24  |  1.00    Ca    9.6      11 Jun 2024 05:05          IMAGING: Reviewed by me.     CT Brain Stroke Protocol (06.05.24 @ 05:29)  COMPARISON: None.    PROCEDURE:  Noncontrast CT of the Head was performed from the skull base to the   vertex. Coronal and Sagittal reformats were obtained.    FINDINGS:    Acute 2.6 x 2.0 x 2.6 cm left thalamocapsular/corona radiata hemorrhage   with intraventricular extension within the lateral, third, and fourth   ventricles. Mild prominence of the ventricular system, which may be on   the basis of cerebral volume loss. Mild patchy hypodensities within the   periventricular and subcortical white matter, although nonspecific,   likely reflect chronic microvascular disease.    Paranasal sinuses are clear. Under pneumatized age and of the bilateral   mastoid air cells. Status post bilateral ocular lens replacement.   Calvarium is intact.    IMPRESSION:    Acute left thalamocapsular/corona radiata parenchymal hemorrhage with  associated intraventricular extension. Mild ventriculomegaly which may be   in the basis of cerebral volume loss.    CT Angio Brain Stroke Protocol  w/ IV Cont (06.05.24 @ 05:39)  IMPRESSION:    CTA Neck: No significant flow-limiting stenosis or evidence of acute   dissection within the cervical carotid or vertebral arteries.    CTA Head: No proximal large vessel occlusion, significant stenosis, or   evidence of intracranial aneurysm.    CT Perfusion: Nondiagnostic.      CT Head No Cont (06.07.24 @ 09:36)  IMPRESSION:  No significant interval change.    CT Head No Cont (06.05.24 @ 12:04)  IMPRESSION:  No interval change.       THE PATIENT WAS SEEN AND EXAMINED BY ME WITH THE HOUSESTAFF AND STROKE TEAM DURING MORNING ROUNDS.   HPI: 83F on ASA81, Gujarrati speaking, h/o DM, HTN, HLD, chronic LBP, p/f inability to move Rt side at 430AM today. 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 CTA negative. SBP in the 180s. Plts/coags wnl. Exam: EOV, awake, Ox3, brisk FC, Lt side 5/5, RUE 4-/5, RLE 3/5  (05 Jun 2024 07:26)    SUBJECTIVE: No events overnight.  No new neurologic complaints.      acetaminophen     Tablet .. 650 milliGRAM(s) Oral every 6 hours PRN  atorvastatin 10 milliGRAM(s) Oral at bedtime  baclofen 5 milliGRAM(s) Oral every 8 hours  enoxaparin Injectable 40 milliGRAM(s) SubCutaneous every 24 hours  gabapentin 300 milliGRAM(s) Oral daily  insulin glargine Injectable (LANTUS) 5 Unit(s) SubCutaneous at bedtime  insulin lispro (ADMELOG) corrective regimen sliding scale   SubCutaneous Before meals and at bedtime  labetalol 200 milliGRAM(s) Oral every 6 hours  losartan 100 milliGRAM(s) Oral daily  ondansetron Injectable 4 milliGRAM(s) IV Push every 6 hours PRN  pantoprazole  Injectable 40 milliGRAM(s) IV Push daily  phenazopyridine 100 milliGRAM(s) Oral every 8 hours PRN  polyethylene glycol 3350 17 Gram(s) Oral daily  senna 2 Tablet(s) Oral at bedtime  trimethoprim  160 mG/sulfamethoxazole 800 mG 1 Tablet(s) Oral two times a day    PHYSICAL EXAM:   Vital Signs Last 24 Hrs  T(C): 36.5 (10 James 2024 20:00), Max: 36.5 (10 James 2024 08:00)  T(F): 97.7 (10 James 2024 20:00), Max: 97.7 (10 James 2024 08:00)  HR: 58 (11 Jun 2024 06:00) (50 - 62)  BP: 146/64 (11 Jun 2024 06:00) (104/53 - 182/81)  BP(mean): 98 (11 Jun 2024 04:00) (77 - 117)  RR: 22 (11 Jun 2024 06:00) (20 - 30)  SpO2: 100% (11 Jun 2024 06:00) (91% - 100%)    Parameters below as of 11 Jun 2024 06:00  Patient On (Oxygen Delivery Method): nasal cannula  O2 Flow (L/min): 2    Translation provided by staff at bedside   General: No acute distress  HEENT: EOM intact, visual fields full  Abdomen: Soft, nontender, nondistended   Extremities: No edema    NEUROLOGICAL EXAM:   Mental status: Eyes open, awake and alert. Attentive to examiner. AOx2. No neglect. Follows simple commands.   Cranial Nerves: Mild right facial droop. Mild dysarthria. No aphasia.   Motor exam: Normal tone. RUE some effort 3/5, RLE some effort 4/5, with drift. 5/5 LUE, 5/5 LLE.   Sensation: Intact to light touch   Coordination/ Gait: Unable to asses at this time       LABS:                        11.3   8.15  )-----------( 264      ( 11 Jun 2024 05:06 )             36.1    06-11    149<H>  |  112<H>  |  54<H>  ----------------------------<  130<H>  4.1   |  24  |  1.00    Ca    9.6      11 Jun 2024 05:05          IMAGING: Reviewed by me.     CT Brain Stroke Protocol (06.05.24 @ 05:29)  COMPARISON: None.    PROCEDURE:  Noncontrast CT of the Head was performed from the skull base to the   vertex. Coronal and Sagittal reformats were obtained.    FINDINGS:    Acute 2.6 x 2.0 x 2.6 cm left thalamocapsular/corona radiata hemorrhage   with intraventricular extension within the lateral, third, and fourth   ventricles. Mild prominence of the ventricular system, which may be on   the basis of cerebral volume loss. Mild patchy hypodensities within the   periventricular and subcortical white matter, although nonspecific,   likely reflect chronic microvascular disease.    Paranasal sinuses are clear. Under pneumatized age and of the bilateral   mastoid air cells. Status post bilateral ocular lens replacement.   Calvarium is intact.    IMPRESSION:    Acute left thalamocapsular/corona radiata parenchymal hemorrhage with  associated intraventricular extension. Mild ventriculomegaly which may be   in the basis of cerebral volume loss.    CT Angio Brain Stroke Protocol  w/ IV Cont (06.05.24 @ 05:39)  IMPRESSION:    CTA Neck: No significant flow-limiting stenosis or evidence of acute   dissection within the cervical carotid or vertebral arteries.    CTA Head: No proximal large vessel occlusion, significant stenosis, or   evidence of intracranial aneurysm.    CT Perfusion: Nondiagnostic.      CT Head No Cont (06.07.24 @ 09:36)  IMPRESSION:  No significant interval change.    CT Head No Cont (06.05.24 @ 12:04)  IMPRESSION:  No interval change.

## 2024-06-11 NOTE — DISCHARGE NOTE PROVIDER - NSDCCPCAREPLAN_GEN_ALL_CORE_FT
PRINCIPAL DISCHARGE DIAGNOSIS  Diagnosis: Thalamic hemorrhage  Assessment and Plan of Treatment: Please follow up with neurologist in 1-2 week. Continue taking medications as prescribed. Monitor your blood pressure. Reduce fat, cholesterol and salt in your diet. Increase intake of fruits and vegetables. Limit alcohol to minimum and do not smoke. You may be at risk for falling, make changes to your home to help you walk easier. Keep up to date on vaccinations.  If you experience any symptoms of facial drooping, slurred speech, arm or leg weakness, severe headache, vision changes or any worsening symptoms, notify provider immediatley and return to ER.

## 2024-06-11 NOTE — DISCHARGE NOTE PROVIDER - PROVIDER TOKENS
PROVIDER:[TOKEN:[[id number]],FOLLOWUP:[2 weeks]] PROVIDER:[TOKEN:[[id number]],FOLLOWUP:[2 weeks]],PROVIDER:[TOKEN:[[id number]]]

## 2024-06-11 NOTE — DISCHARGE NOTE PROVIDER - HOSPITAL COURSE
83F on ASA81, Gujarrati speaking, h/o DM, HTN, HLD, chronic LBP, p/f inability to move Rt side at 430AM today. 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 CTA negative. SBP in the 180s. Plts/coags wnl. Exam: EOV, awake, Ox3, brisk FC, Lt side 5/5, RUE 4-/5, RLE 3/5.    IMPRESSION: Right hemiparesis, c/w L thalamic IPH with IVH. The most likely etiology is hypertensive.     Imaging:     CT Brain Stroke Protocol (06.05.24 @ 05:29):   Acute left thalamocapsular/corona radiata parenchymal hemorrhage with  associated intraventricular extension. Mild ventriculomegaly which may be   in the basis of cerebral volume loss.    CT Angio Brain Stroke Protocol  w/ IV Cont (06.05.24 @ 05:39)  CTA Neck: No significant flow-limiting stenosis or evidence of acute   dissection within the cervical carotid or vertebral arteries.    CTA Head: No proximal large vessel occlusion, significant stenosis, or   evidence of intracranial aneurysm.    CT Perfusion: Nondiagnostic.    CT Head No Cont (06.07.24 @ 09:36)  IMPRESSION:  No significant interval change.    CT Head No Cont (06.05.24 @ 12:04)  IMPRESSION:  No interval change    LE dopplers (6/5/24): No evidence of deep venous thrombosis in either lower extremity.    TTE:    1. Left ventricular cavity is normal in size. Left ventricular systolic function is hyperdynamic with an ejection fraction of 82 % by Chauhan's method of disks. There are no regional wall motion abnormalities seen.   2. Normal right ventricular cavity size and normal systolic function. Tricuspid annular plane systolic excursion (TAPSE) is 1.9 cm (normal >=1.7 cm). Tricuspid annular tissue Doppler S' is 14.0 cm/s (normal >10 cm/s).   3. No significant valvular disease.   4. The inferior vena cava is normal in size (normal <2.1cm) with normal inspiratory collapse (normal >50%) consistent with normal right atrial pressure (~3, range 0-5mmHg).   5. No prior echocardiogram is available for comparison.    Labs show an HbA1c of 7.5 and an LDL of 33.      PT/OT evaluated the pt and decided their disposition to be AR.  Tolerating a soft and bite sized diet.      Blood pressure has been controlled during admission on losartan 100mg daily and labatelol 200mg q6 hours.  Will need to follow up with stroke neurology in 1-2 weeks after discharge from rehab, will plan for repeat MRI as outpatient at that time.  Will no longer continue home aspirin, will consider starting in future as outpatient if medically necessary.      Was started on bactrim for UTI will continue 7 day course, started on 6/8/24.      Will need to follow up with PCP after discharge from rehab for DM and HTN.     Patient is neurologically stable to AR today. 83F on ASA81, Gujarrati speaking, h/o DM, HTN, HLD, chronic LBP, p/f inability to move Rt side at 430AM today. 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 CTA negative. SBP in the 180s. Plts/coags wnl. Exam: EOV, awake, Ox3, brisk FC, Lt side 5/5, RUE 4-/5, RLE 3/5.    IMPRESSION: Right hemiparesis, c/w L thalamic IPH with IVH. The most likely etiology is hypertensive.     Imaging:     CT Brain Stroke Protocol (06.05.24 @ 05:29):   Acute left thalamocapsular/corona radiata parenchymal hemorrhage with  associated intraventricular extension. Mild ventriculomegaly which may be   in the basis of cerebral volume loss.    CT Angio Brain Stroke Protocol  w/ IV Cont (06.05.24 @ 05:39)  CTA Neck: No significant flow-limiting stenosis or evidence of acute   dissection within the cervical carotid or vertebral arteries.    CTA Head: No proximal large vessel occlusion, significant stenosis, or   evidence of intracranial aneurysm.    CT Perfusion: Nondiagnostic.    CT Head No Cont (06.07.24 @ 09:36)  IMPRESSION:  No significant interval change.    CT Head No Cont (06.05.24 @ 12:04)  IMPRESSION:  No interval change    LE dopplers (6/5/24): No evidence of deep venous thrombosis in either lower extremity.    TTE:    1. Left ventricular cavity is normal in size. Left ventricular systolic function is hyperdynamic with an ejection fraction of 82 % by Chauhan's method of disks. There are no regional wall motion abnormalities seen.   2. Normal right ventricular cavity size and normal systolic function. Tricuspid annular plane systolic excursion (TAPSE) is 1.9 cm (normal >=1.7 cm). Tricuspid annular tissue Doppler S' is 14.0 cm/s (normal >10 cm/s).   3. No significant valvular disease.   4. The inferior vena cava is normal in size (normal <2.1cm) with normal inspiratory collapse (normal >50%) consistent with normal right atrial pressure (~3, range 0-5mmHg).   5. No prior echocardiogram is available for comparison.    Labs show an HbA1c of 7.5 and an LDL of 33.      PT/OT evaluated the pt and decided their disposition to be AR.  Tolerating a soft and bite sized diet.      Blood pressure has been controlled during admission on losartan 100mg daily and labatelol 200mg q6 hours.  Will need to follow up with stroke neurology in 1-2 weeks after discharge from rehab, will plan for repeat MRI as outpatient at that time.  Will no longer continue home aspirin, will consider starting in future as outpatient if medically necessary.      Was started on bactrim for UTI will continue 7 day course, started on 6/8/24.      Will need to follow up with PCP after discharge from rehab for DM and HTN.     Podiatry saw patient   Right heel DTI: Stable, noninfected, present on admission  Left third toe early DTI: Stable, noninfected, present on admission    Recommend Betadine paint to left third toe every other day.  Recommend Betadine paint to right heel DTI site every other day with application of Allevyn foam dressing.  Recommend continue decubitus precautions and use of Xeroflo boots while in house.  Reconsult podiatry as needed      Patient is neurologically stable to AR today. 84 YO F on ASA81, Gujarrati speaking, h/o DM, HTN, HLD, chronic LBP, p/f inability to move Rt side at 430AM today. 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 CTA negative. SBP in the 180s. Plts/coags wnl. Exam: EOV, awake, Ox3, brisk FC, Lt side 5/5, RUE 4-/5, RLE 3/5.    IMPRESSION: Right hemiparesis, c/w L thalamic IPH with IVH. The most likely etiology is hypertensive.     Imaging:     CT Brain Stroke Protocol (06.05.24 @ 05:29):   Acute left thalamocapsular/corona radiata parenchymal hemorrhage with  associated intraventricular extension. Mild ventriculomegaly which may be   in the basis of cerebral volume loss.    CT Angio Brain Stroke Protocol  w/ IV Cont (06.05.24 @ 05:39)  CTA Neck: No significant flow-limiting stenosis or evidence of acute   dissection within the cervical carotid or vertebral arteries.    CTA Head: No proximal large vessel occlusion, significant stenosis, or   evidence of intracranial aneurysm.    CT Perfusion: Nondiagnostic.    CT Head No Cont (06.07.24 @ 09:36)  IMPRESSION:  No significant interval change.    CT Head No Cont (06.05.24 @ 12:04)  IMPRESSION:  No interval change    LE dopplers (6/5/24): No evidence of deep venous thrombosis in either lower extremity.    TTE:    1. Left ventricular cavity is normal in size. Left ventricular systolic function is hyperdynamic with an ejection fraction of 82 % by Chauhan's method of disks. There are no regional wall motion abnormalities seen.   2. Normal right ventricular cavity size and normal systolic function. Tricuspid annular plane systolic excursion (TAPSE) is 1.9 cm (normal >=1.7 cm). Tricuspid annular tissue Doppler S' is 14.0 cm/s (normal >10 cm/s).   3. No significant valvular disease.   4. The inferior vena cava is normal in size (normal <2.1cm) with normal inspiratory collapse (normal >50%) consistent with normal right atrial pressure (~3, range 0-5mmHg).   5. No prior echocardiogram is available for comparison.    Labs show an HbA1c of 7.5 and an LDL of 33.      PT/OT evaluated the pt and decided their disposition to be AR.  Tolerating a soft and bite sized diet.      Blood pressure has been controlled during admission on losartan 100mg daily and labatelol 200mg q6 hours.  Will need to follow up with stroke neurology in 1-2 weeks after discharge from rehab, will plan for repeat MRI as outpatient at that time.  Will no longer continue home aspirin, will consider starting in future as outpatient if medically necessary.      Was started on bactrim for UTI will continue 7 day course, started on 6/8/24.      Will need to follow up with PCP after discharge from rehab for DM and HTN.     Podiatry saw patient   Right heel DTI: Stable, noninfected, present on admission  Left third toe early DTI: Stable, noninfected, present on admission    Recommend Betadine paint to left third toe every other day.  Recommend Betadine paint to right heel DTI site every other day with application of Allevyn foam dressing.  Recommend continue decubitus precautions and use of Xeroflo boots while in house.  Reconsult podiatry as needed      Patient is neurologically stable to AR today.

## 2024-06-12 LAB
-  AMOXICILLIN/CLAVULANIC ACID: SIGNIFICANT CHANGE UP
-  AMPICILLIN/SULBACTAM: SIGNIFICANT CHANGE UP
-  AMPICILLIN: SIGNIFICANT CHANGE UP
-  AZTREONAM: SIGNIFICANT CHANGE UP
-  CEFAZOLIN: SIGNIFICANT CHANGE UP
-  CEFEPIME: SIGNIFICANT CHANGE UP
-  CEFOXITIN: SIGNIFICANT CHANGE UP
-  CEFTRIAXONE: SIGNIFICANT CHANGE UP
-  CEFUROXIME: SIGNIFICANT CHANGE UP
-  CIPROFLOXACIN: SIGNIFICANT CHANGE UP
-  ERTAPENEM: SIGNIFICANT CHANGE UP
-  GENTAMICIN: SIGNIFICANT CHANGE UP
-  IMIPENEM: SIGNIFICANT CHANGE UP
-  LEVOFLOXACIN: SIGNIFICANT CHANGE UP
-  MEROPENEM: SIGNIFICANT CHANGE UP
-  NITROFURANTOIN: SIGNIFICANT CHANGE UP
-  PIPERACILLIN/TAZOBACTAM: SIGNIFICANT CHANGE UP
-  TOBRAMYCIN: SIGNIFICANT CHANGE UP
-  TRIMETHOPRIM/SULFAMETHOXAZOLE: SIGNIFICANT CHANGE UP
ANION GAP SERPL CALC-SCNC: 13 MMOL/L — SIGNIFICANT CHANGE UP (ref 5–17)
BASOPHILS # BLD AUTO: 0.04 K/UL — SIGNIFICANT CHANGE UP (ref 0–0.2)
BASOPHILS NFR BLD AUTO: 0.5 % — SIGNIFICANT CHANGE UP (ref 0–2)
BUN SERPL-MCNC: 56 MG/DL — HIGH (ref 7–23)
CALCIUM SERPL-MCNC: 9.5 MG/DL — SIGNIFICANT CHANGE UP (ref 8.4–10.5)
CHLORIDE SERPL-SCNC: 111 MMOL/L — HIGH (ref 96–108)
CO2 SERPL-SCNC: 23 MMOL/L — SIGNIFICANT CHANGE UP (ref 22–31)
CREAT SERPL-MCNC: 1.06 MG/DL — SIGNIFICANT CHANGE UP (ref 0.5–1.3)
CULTURE RESULTS: ABNORMAL
EGFR: 53 ML/MIN/1.73M2 — LOW
EOSINOPHIL # BLD AUTO: 0.08 K/UL — SIGNIFICANT CHANGE UP (ref 0–0.5)
EOSINOPHIL NFR BLD AUTO: 1.1 % — SIGNIFICANT CHANGE UP (ref 0–6)
GLUCOSE BLDC GLUCOMTR-MCNC: 137 MG/DL — HIGH (ref 70–99)
GLUCOSE BLDC GLUCOMTR-MCNC: 137 MG/DL — HIGH (ref 70–99)
GLUCOSE BLDC GLUCOMTR-MCNC: 176 MG/DL — HIGH (ref 70–99)
GLUCOSE SERPL-MCNC: 118 MG/DL — HIGH (ref 70–99)
HCT VFR BLD CALC: 35.8 % — SIGNIFICANT CHANGE UP (ref 34.5–45)
HGB BLD-MCNC: 11.2 G/DL — LOW (ref 11.5–15.5)
IMM GRANULOCYTES NFR BLD AUTO: 0.4 % — SIGNIFICANT CHANGE UP (ref 0–0.9)
LYMPHOCYTES # BLD AUTO: 2.05 K/UL — SIGNIFICANT CHANGE UP (ref 1–3.3)
LYMPHOCYTES # BLD AUTO: 27.6 % — SIGNIFICANT CHANGE UP (ref 13–44)
MCHC RBC-ENTMCNC: 26.6 PG — LOW (ref 27–34)
MCHC RBC-ENTMCNC: 31.3 GM/DL — LOW (ref 32–36)
MCV RBC AUTO: 85 FL — SIGNIFICANT CHANGE UP (ref 80–100)
METHOD TYPE: SIGNIFICANT CHANGE UP
MONOCYTES # BLD AUTO: 0.58 K/UL — SIGNIFICANT CHANGE UP (ref 0–0.9)
MONOCYTES NFR BLD AUTO: 7.8 % — SIGNIFICANT CHANGE UP (ref 2–14)
NEUTROPHILS # BLD AUTO: 4.64 K/UL — SIGNIFICANT CHANGE UP (ref 1.8–7.4)
NEUTROPHILS NFR BLD AUTO: 62.6 % — SIGNIFICANT CHANGE UP (ref 43–77)
NRBC # BLD: 0 /100 WBCS — SIGNIFICANT CHANGE UP (ref 0–0)
ORGANISM # SPEC MICROSCOPIC CNT: ABNORMAL
ORGANISM # SPEC MICROSCOPIC CNT: ABNORMAL
PLATELET # BLD AUTO: 252 K/UL — SIGNIFICANT CHANGE UP (ref 150–400)
POTASSIUM SERPL-MCNC: 4.3 MMOL/L — SIGNIFICANT CHANGE UP (ref 3.5–5.3)
POTASSIUM SERPL-SCNC: 4.3 MMOL/L — SIGNIFICANT CHANGE UP (ref 3.5–5.3)
RBC # BLD: 4.21 M/UL — SIGNIFICANT CHANGE UP (ref 3.8–5.2)
RBC # FLD: 16.3 % — HIGH (ref 10.3–14.5)
SODIUM SERPL-SCNC: 147 MMOL/L — HIGH (ref 135–145)
SPECIMEN SOURCE: SIGNIFICANT CHANGE UP
WBC # BLD: 7.42 K/UL — SIGNIFICANT CHANGE UP (ref 3.8–10.5)
WBC # FLD AUTO: 7.42 K/UL — SIGNIFICANT CHANGE UP (ref 3.8–10.5)

## 2024-06-12 PROCEDURE — 99233 SBSQ HOSP IP/OBS HIGH 50: CPT | Mod: FS

## 2024-06-12 PROCEDURE — 99232 SBSQ HOSP IP/OBS MODERATE 35: CPT

## 2024-06-12 RX ADMIN — Medication 1 TABLET(S): at 16:04

## 2024-06-12 RX ADMIN — Medication 1 ENEMA: at 11:35

## 2024-06-12 RX ADMIN — POLYETHYLENE GLYCOL 3350 17 GRAM(S): 17 POWDER, FOR SOLUTION ORAL at 11:34

## 2024-06-12 RX ADMIN — SODIUM CHLORIDE 4 MILLILITER(S): 9 INJECTION INTRAMUSCULAR; INTRAVENOUS; SUBCUTANEOUS at 16:05

## 2024-06-12 RX ADMIN — LOSARTAN POTASSIUM 100 MILLIGRAM(S): 100 TABLET, FILM COATED ORAL at 06:07

## 2024-06-12 RX ADMIN — Medication 200 MILLIGRAM(S): at 16:11

## 2024-06-12 RX ADMIN — SENNA PLUS 2 TABLET(S): 8.6 TABLET ORAL at 22:07

## 2024-06-12 RX ADMIN — SODIUM CHLORIDE 4 MILLILITER(S): 9 INJECTION INTRAMUSCULAR; INTRAVENOUS; SUBCUTANEOUS at 05:37

## 2024-06-12 RX ADMIN — Medication 5 MILLIGRAM(S): at 22:07

## 2024-06-12 RX ADMIN — PANTOPRAZOLE SODIUM 40 MILLIGRAM(S): 20 TABLET, DELAYED RELEASE ORAL at 11:34

## 2024-06-12 RX ADMIN — ATORVASTATIN CALCIUM 10 MILLIGRAM(S): 80 TABLET, FILM COATED ORAL at 22:07

## 2024-06-12 RX ADMIN — Medication 2: at 16:04

## 2024-06-12 RX ADMIN — Medication 5 MILLIGRAM(S): at 13:09

## 2024-06-12 RX ADMIN — Medication 5 MILLIGRAM(S): at 05:38

## 2024-06-12 RX ADMIN — Medication 650 MILLIGRAM(S): at 02:21

## 2024-06-12 RX ADMIN — Medication 1 TABLET(S): at 05:38

## 2024-06-12 RX ADMIN — ENOXAPARIN SODIUM 40 MILLIGRAM(S): 100 INJECTION SUBCUTANEOUS at 11:36

## 2024-06-12 RX ADMIN — INSULIN GLARGINE 5 UNIT(S): 100 INJECTION, SOLUTION SUBCUTANEOUS at 22:07

## 2024-06-12 RX ADMIN — Medication 650 MILLIGRAM(S): at 02:45

## 2024-06-12 RX ADMIN — GABAPENTIN 300 MILLIGRAM(S): 400 CAPSULE ORAL at 11:34

## 2024-06-12 RX ADMIN — Medication 650 MILLIGRAM(S): at 23:07

## 2024-06-12 RX ADMIN — Medication 200 MILLIGRAM(S): at 00:09

## 2024-06-12 NOTE — PROGRESS NOTE ADULT - SUBJECTIVE AND OBJECTIVE BOX
THE PATIENT WAS SEEN AND EXAMINED BY ME WITH THE HOUSESTAFF AND STROKE TEAM DURING MORNING ROUNDS.   HPI:  83F on ASA81, Gujarrati speaking, h/o DM, HTN, HLD, chronic LBP, p/f inability to move Rt side at 430AM today. 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 CTA negative. SBP in the 180s. Plts/coags wnl. Exam: EOV, awake, Ox3, brisk FC, Lt side 5/5, RUE 4-/5, RLE 3/5  (05 Jun 2024 07:26)      ROS: Otherwise negative.    SUBJECTIVE: No events overnight.  No new neurologic complaints.  Patient family noting absent bowel movement for the last few days and decreased appetite. Patient was given enema and had liquid stools.     acetaminophen     Tablet .. 650 milliGRAM(s) Oral every 6 hours PRN  albuterol/ipratropium for Nebulization 3 milliLiter(s) Nebulizer every 6 hours PRN  atorvastatin 10 milliGRAM(s) Oral at bedtime  baclofen 5 milliGRAM(s) Oral every 8 hours  enoxaparin Injectable 40 milliGRAM(s) SubCutaneous every 24 hours  gabapentin 300 milliGRAM(s) Oral daily  guaiFENesin Oral Liquid (Sugar-Free) 100 milliGRAM(s) Oral every 6 hours PRN  insulin glargine Injectable (LANTUS) 5 Unit(s) SubCutaneous at bedtime  insulin lispro (ADMELOG) corrective regimen sliding scale   SubCutaneous Before meals and at bedtime  labetalol 200 milliGRAM(s) Oral every 6 hours  losartan 100 milliGRAM(s) Oral daily  ondansetron Injectable 4 milliGRAM(s) IV Push every 6 hours PRN  pantoprazole  Injectable 40 milliGRAM(s) IV Push daily  phenazopyridine 100 milliGRAM(s) Oral every 8 hours PRN  polyethylene glycol 3350 17 Gram(s) Oral daily  senna 2 Tablet(s) Oral at bedtime  sodium chloride 3%  Inhalation 4 milliLiter(s) Inhalation every 12 hours  trimethoprim  160 mG/sulfamethoxazole 800 mG 1 Tablet(s) Oral two times a day      PHYSICAL EXAM:   Vital Signs Last 24 Hrs  T(C): 36.7 (12 Jun 2024 08:00), Max: 36.7 (12 Jun 2024 08:00)  T(F): 98 (12 Jun 2024 08:00), Max: 98 (12 Jun 2024 08:00)  HR: 60 (12 Jun 2024 16:00) (50 - 60)  BP: 152/67 (12 Jun 2024 16:00) (104/51 - 156/67)  BP(mean): 97 (12 Jun 2024 16:00) (72 - 100)  RR: 34 (12 Jun 2024 16:00) (20 - 34)  SpO2: 94% (12 Jun 2024 16:00) (93% - 100%)    Parameters below as of 12 Jun 2024 16:00  Patient On (Oxygen Delivery Method): nasal cannula  O2 Flow (L/min): 2      General: No acute distress    NEUROLOGICAL EXAM:   Mental status: Eyes open, awake and alert. Attentive to examiner. AOx2. No neglect. Follows simple commands.   Cranial Nerves: Mild right facial droop. Mild dysarthria. No aphasia.   Motor exam: Normal tone. RUE some effort 3/5, RLE some effort 4/5, with drift. 5/5 LUE, 5/5 LLE.   Sensation: Intact to light touch   Coordination/ Gait: Unable to asses at this time       LABS:                        11.2   7.42  )-----------( 252      ( 12 Jun 2024 05:58 )             35.8    06-12    147<H>  |  111<H>  |  56<H>  ----------------------------<  118<H>  4.3   |  23  |  1.06    Ca    9.5      12 Jun 2024 05:58          IMAGING:    < from: CT Head No Cont (06.11.24 @ 14:31) >  IMPRESSION: No change in left thalamic capsular hemorrhage with   intraventricular extension compared with 6/7/2024.    < end of copied text >      CT Brain Stroke Protocol (06.05.24 @ 05:29)  COMPARISON: None.    PROCEDURE:  Noncontrast CT of the Head was performed from the skull base to the   vertex. Coronal and Sagittal reformats were obtained.    FINDINGS:    Acute 2.6 x 2.0 x 2.6 cm left thalamocapsular/corona radiata hemorrhage   with intraventricular extension within the lateral, third, and fourth   ventricles. Mild prominence of the ventricular system, which may be on   the basis of cerebral volume loss. Mild patchy hypodensities within the   periventricular and subcortical white matter, although nonspecific,   likely reflect chronic microvascular disease.    Paranasal sinuses are clear. Under pneumatized age and of the bilateral   mastoid air cells. Status post bilateral ocular lens replacement.   Calvarium is intact.    IMPRESSION:    Acute left thalamocapsular/corona radiata parenchymal hemorrhage with  associated intraventricular extension. Mild ventriculomegaly which may be   in the basis of cerebral volume loss.    CT Angio Brain Stroke Protocol  w/ IV Cont (06.05.24 @ 05:39)  IMPRESSION:    CTA Neck: No significant flow-limiting stenosis or evidence of acute   dissection within the cervical carotid or vertebral arteries.    CTA Head: No proximal large vessel occlusion, significant stenosis, or   evidence of intracranial aneurysm.    CT Perfusion: Nondiagnostic.      CT Head No Cont (06.07.24 @ 09:36)  IMPRESSION:  No significant interval change.    CT Head No Cont (06.05.24 @ 12:04)  IMPRESSION:  No interval change.

## 2024-06-12 NOTE — PROGRESS NOTE ADULT - ASSESSMENT
83-year-old female with PMHx HTN, HLD, DM, spinal stenosis, who presents w/ CC of abnormal CTH at outside hospital, showing "acute left thalamocapsular / corona radiata parenchymal hemorrhage with associated intraventricular extension," associated with right hemiparesis.    IMPRESSION: Right hemiparesis, c/w L thalamic IPH with IVH. The most likely etiology is hypertensive.     NEURO: Neurologically improved since admission. Continue close monitoring for neurologic deterioration. Continue to monitor blood pressure. BP goal < 140/90. Physical therapy/OT: AR. follow up stability CT brain obtained before discharge and was stable.     ANTITHROMBOTIC THERAPY: none in the setting of IPH    PULMONARY: CXR clear 06/07, protecting airway, saturating well     CARDIOVASCULAR: TTE 6/5: EF 82%, no significant valvular disease, normal LA; c/w cardiac monitoring                              SBP goal: < 140/90 continue on labetalol 200 mg q 6 hours (hold for P<55), continue with losartan 100 daily    GASTROINTESTINAL: Oropharyngeal dysphagia on S/S evaluation; s/p MBS with upgrade in diet     Diet: soft and bite sized, having decreased appetite and decreased bowel movement but improved with enema     RENAL: BUN & Cr elevated will continue to monitor     Na Goal: Greater than 135     Harden: NO, straight cath for residual urine     continue Bactrim for total of 7 days started June 6 for UTI - should stop June 12    ENDOCRINOLOGY: currently on sliding scale insuling     HEMATOLOGY: H/H stable, platelets 264,  Jesu US: negative for DVT     DVT ppx: Lovenox     ID: afebrile, no leukocytosis     OTHER: Discussed plan with family at bedside.  Na slowly downtrending.      DISPOSITION: Acute inpatient rehab, stable for discharge      CORE MEASURES:        Admission NIHSS: 7     TPA: NO     LDL/HDL: 33/57     Depression Screen: N/A     Statin Therapy: YES      Dysphagia Screen: PASS     Smoking: NO      Afib: NO      Stroke Education: YES    Obtain screening lower extremity venous ultrasound in patients who meet 1 or more of the following criteria as patient is high risk for DVT/PE on admission:   [] History of DVT/PE  [] Hypercoagulable states (Factor V Leiden, Cancer, OCP, etc. )  [] Prolonged immobility (hemiplegia/hemiparesis/post operative or any other extended immobilization)  [] Transferred from outside facility (Rehab or Long term care)  [] Age </= to 50   83-year-old female with PMHx HTN, HLD, DM, spinal stenosis, who presents w/ CC of abnormal CTH at outside hospital, showing "acute left thalamocapsular / corona radiata parenchymal hemorrhage with associated intraventricular extension," associated with right hemiparesis.    IMPRESSION: Right hemiparesis, c/w L thalamic IPH with IVH. The most likely etiology is hypertensive.     NEURO: Neurologically improved since admission. Continue close monitoring for neurologic deterioration. Continue to monitor blood pressure. BP goal < 140/90. Physical therapy/OT: AR. follow up stability CT brain obtained before discharge and was stable.     ANTITHROMBOTIC THERAPY: none in the setting of IPH    PULMONARY: CXR clear 06/07, protecting airway, saturating well     CARDIOVASCULAR: TTE 6/5: EF 82%, no significant valvular disease, normal LA; c/w cardiac monitoring                              SBP goal: < 140/90 continue on labetalol 200 mg q 6 hours (hold for P<55), continue with losartan 100 daily    GASTROINTESTINAL: Oropharyngeal dysphagia on S/S evaluation; s/p MBS with upgrade in diet     Diet: soft and bite sized, having decreased appetite and decreased bowel movement but improved with enema     RENAL: BUN & Cr elevated will continue to monitor     Na Goal: Greater than 135     Harden: NO, straight cath for residual urine     continue Bactrim for total of 7 days started June 8 for UTI - should stop June 14    ENDOCRINOLOGY: currently on sliding scale insuling     HEMATOLOGY: H/H stable, platelets 264,  Jesu US: negative for DVT     DVT ppx: Lovenox     ID: afebrile, no leukocytosis     OTHER: Discussed plan with family at bedside.  Na slowly downtrending.      DISPOSITION: Acute inpatient rehab, stable for discharge      CORE MEASURES:        Admission NIHSS: 7     TPA: NO     LDL/HDL: 33/57     Depression Screen: N/A     Statin Therapy: YES      Dysphagia Screen: PASS     Smoking: NO      Afib: NO      Stroke Education: YES    Obtain screening lower extremity venous ultrasound in patients who meet 1 or more of the following criteria as patient is high risk for DVT/PE on admission:   [] History of DVT/PE  [] Hypercoagulable states (Factor V Leiden, Cancer, OCP, etc. )  [] Prolonged immobility (hemiplegia/hemiparesis/post operative or any other extended immobilization)  [] Transferred from outside facility (Rehab or Long term care)  [] Age </= to 50

## 2024-06-12 NOTE — CONSULT NOTE ADULT - ASSESSMENT
A/p  83F on ASA81, Gujarrati speaking, h/o DM, HTN, HLD, chronic LBP, p/f inability to move Rt side at 430AM today. 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 Cardio consulted for PAT on tele.    #PAT  -Brief episode of PAT on tele lasting 4 seconds  -No further episodes noted  -Otherwise remains sinus bradycardia on tele 50s  -Continue labetalol as ordered   -Echo nml lv fxn, no wma, no sig valvular dz  -Replete electrolytes prn  -No further cardiac w/u indicated    #HTN  -Cont labetalol, losartan    #L thalamic IPH with IVH  -sp nsicu course  -Mgmt per neuro/neurosx

## 2024-06-12 NOTE — PROGRESS NOTE ADULT - ASSESSMENT
-Downgraded from NSCU   -Dispo to rehab  -CTH prior to d/c  -Downgraded from NSCU   -Dispo to rehab  -CTH done yesterday shows resolving IVH, 3rd and 4th ventricles almost clear of IVH. Exam has remained stable since admission on 6/5/24. There is no neurosurgical contraindication to discharge.   -Patient can follow up in the office with neurology and Dr. Echeverria

## 2024-06-12 NOTE — CONSULT NOTE ADULT - SUBJECTIVE AND OBJECTIVE BOX
CARDIOLOGY CONSULT - Dr. Foster         HPI:  83F on ASA81, Gujarrati speaking, h/o DM, HTN, HLD, chronic LBP, p/f inability to move Rt side at 430AM today. 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 CTA negative. SBP in the 180s. Plts/coags wnl. Exam: EOV, awake, Ox3, brisk FC, Lt side 5/5, RUE 4-/5, RLE 3/5  (05 Jun 2024 07:26)      PAST MEDICAL & SURGICAL HISTORY:  HTN (hypertension)      DM (diabetes mellitus)      PREVIOUS DIAGNOSTIC TESTING:    [x] Echocardiogram:  < from: TTE W or WO Ultrasound Enhancing Agent (06.05.24 @ 15:38) >   1.Left ventricular cavity is normal in size. Left ventricular systolic function is hyperdynamic with an ejection fraction of 82 % by Chauhan's method of disks. There are no regional wall motion abnormalities seen.   2. Normal right ventricular cavity size and normal systolic function. Tricuspid annular plane systolic excursion (TAPSE) is 1.9 cm (normal >=1.7 cm). Tricuspid annular tissue Doppler S' is 14.0 cm/s (normal >10 cm/s).   3. No significant valvular disease.   4. The inferior vena cava is normal in size (normal <2.1cm) with normal inspiratory collapse (normal >50%) consistent with normal right atrial pressure (~3, range 0-5mmHg).   5. No prior echocardiogram is available for comparison.    < end of copied text >    [ ]  Catheterization:  [ ] Stress Test:  	    MEDICATIONS:  Home Medications:  acetaminophen 325 mg oral tablet: 2 tab(s) orally every 6 hours As needed Mild Pain (1 - 3) (11 Jun 2024 10:30)  baclofen 5 mg oral tablet: 1 tab(s) orally every 8 hours (11 Jun 2024 10:30)  enoxaparin: 40mg sq once a day for dvt ppx (11 Jun 2024 10:30)  gabapentin 300 mg oral tablet: 1 tab(s) orally once a day (05 Jun 2024 09:06)  insulin lispro 100 units/mL injectable solution: injectable 4 times a day (before meals and at bedtime) 2 Unit(s) if Glucose 151 - 200  4 Unit(s) if Glucose 201 - 250  6 Unit(s) if Glucose 251 - 300  8 Unit(s) if Glucose 301 - 350  10 Unit(s) if Glucose 351 - 400  12 Unit(s) if Glucose Greater Than 400 (11 Jun 2024 10:30)  labetalol 200 mg oral tablet: 1 tab(s) orally every 6 hours (11 Jun 2024 10:30)  Lipitor 10 mg oral tablet: 1 tab(s) orally once a day (at bedtime) (05 Jun 2024 09:06)  losartan 100 mg oral tablet: 1 tab(s) orally once a day (11 Jun 2024 10:30)  omeprazole 20 mg oral delayed release capsule: 1 cap(s) orally once a day (05 Jun 2024 09:06)  phenazopyridine 100 mg oral tablet: 1 tab(s) orally every 8 hours As needed pain (11 Jun 2024 10:30)  polyethylene glycol 3350 oral powder for reconstitution: 17 gram(s) orally once a day (11 Jun 2024 10:30)  senna leaf extract oral tablet: 2 tab(s) orally once a day (at bedtime) (11 Jun 2024 10:30)  sulfamethoxazole-trimethoprim 800 mg-160 mg oral tablet: 1 tab(s) orally 2 times a day (11 Jun 2024 10:30)      MEDICATIONS  (STANDING):  atorvastatin 10 milliGRAM(s) Oral at bedtime  baclofen 5 milliGRAM(s) Oral every 8 hours  enoxaparin Injectable 40 milliGRAM(s) SubCutaneous every 24 hours  gabapentin 300 milliGRAM(s) Oral daily  insulin glargine Injectable (LANTUS) 5 Unit(s) SubCutaneous at bedtime  insulin lispro (ADMELOG) corrective regimen sliding scale   SubCutaneous Before meals and at bedtime  labetalol 200 milliGRAM(s) Oral every 6 hours  losartan 100 milliGRAM(s) Oral daily  pantoprazole  Injectable 40 milliGRAM(s) IV Push daily  polyethylene glycol 3350 17 Gram(s) Oral daily  senna 2 Tablet(s) Oral at bedtime  sodium chloride 3%  Inhalation 4 milliLiter(s) Inhalation every 12 hours  trimethoprim  160 mG/sulfamethoxazole 800 mG 1 Tablet(s) Oral two times a day      FAMILY HISTORY:      SOCIAL HISTORY:    [x] Non-smoker  [ ] Smoker  [ ] Alcohol    Allergies    penicillin (Unknown)  quinine (Unknown)    Intolerances    	    REVIEW OF SYSTEMS:  CONSTITUTIONAL: No fever, weight loss, or fatigue  EYES: No eye pain, visual disturbances, or discharge  ENMT:  No difficulty hearing, tinnitus, vertigo; No sinus or throat pain  NECK: No pain or stiffness  RESPIRATORY: No cough, wheezing, chills or hemoptysis; No Shortness of Breath  CARDIOVASCULAR: No chest pain, palpitations, passing out, dizziness, or leg swelling  GASTROINTESTINAL: No abdominal or epigastric pain. No nausea, vomiting, or hematemesis; No diarrhea or constipation. No melena or hematochezia.  GENITOURINARY: No dysuria, frequency, hematuria, or incontinence  NEUROLOGICAL: No headaches, memory loss, loss of strength, numbness, or tremors  SKIN: No itching, burning, rashes, or lesions   	    [ ] All others negative	  [x] Unable to obtain    PHYSICAL EXAM:  T(C): 36.7 (06-12-24 @ 08:00), Max: 36.7 (06-12-24 @ 08:00)  HR: 58 (06-12-24 @ 14:00) (50 - 59)  BP: 137/62 (06-12-24 @ 14:00) (104/51 - 156/67)  RR: 30 (06-12-24 @ 14:00) (20 - 30)  SpO2: 95% (06-12-24 @ 14:00) (93% - 100%)  Wt(kg): --  I&O's Summary    11 Jun 2024 07:01  -  12 Jun 2024 07:00  --------------------------------------------------------  IN: 350 mL / OUT: 1125 mL / NET: -775 mL        Appearance: Elderly female 	  Psychiatry: A & O x 3, Mood & affect appropriate  HEENT:   Normal oral mucosa, PERRL, EOMI	  Lymphatic: No lymphadenopathy  Cardiovascular: Normal S1 S2,RRR, No JVD, No murmurs  Respiratory: Lungs clear to auscultation	  Gastrointestinal:  Soft, Non-tender, + BS	  Skin: No rashes, No ecchymoses, No cyanosis	  Neurologic: Non-focal  Extremities: Normal range of motion, No clubbing, cyanosis or edema  Vascular: Peripheral pulses palpable 2+ bilaterally    TELEMETRY: Sinus bradycardia, brief PAT 4 seconds	    ECG:  Sinus rhythm	  RADIOLOGY:  < from: Xray Chest 1 View- PORTABLE-Routine (Xray Chest 1 View- PORTABLE-Routine .) (06.07.24 @ 10:51) >    IMPRESSION:    Negative for acute pulmonary disease.    --- End of Report---    < end of copied text >    OTHER: 	  	  LABS:	 	    CARDIAC MARKERS:                                  11.2   7.42  )-----------( 252      ( 12 Jun 2024 05:58 )             35.8     06-12    147<H>  |  111<H>  |  56<H>  ----------------------------<  118<H>  4.3   |  23  |  1.06    Ca    9.5      12 Jun 2024 05:58        proBNP:   Lipid Profile:   HgA1c:   TSH:       
HPI:  83F on ASA81, Gujarrati speaking, h/o DM, HTN, HLD, chronic LBP, p/f inability to move Rt side at 430AM today. 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 CTA negative. SBP in the 180s. Plts/coags wnl. Exam: EOV, awake, Ox3, brisk FC, Lt side 5/5, RUE 4-/5, RLE 3/5  (05 Jun 2024 07:26)    Patient was admitted on 6/5, seen today, daughter at bedside. Patient lethargic.     REVIEW OF SYSTEMS  unable to obtain     VITALS  T(C): 36.5 (06-10-24 @ 08:00), Max: 36.5 (06-10-24 @ 08:00)  HR: 61 (06-10-24 @ 12:00) (52 - 62)  BP: 154/67 (06-10-24 @ 10:00) (134/64 - 170/74)  RR: 28 (06-10-24 @ 12:00) (20 - 28)  SpO2: 95% (06-10-24 @ 12:00) (94% - 97%)  Wt(kg): --    PAST MEDICAL & SURGICAL HISTORY  HTN (hypertension)    DM (diabetes mellitus)        FUNCTIONAL HISTORY  Lives with son in private house, 5 steps to enter, one flight to bedroom   Independent ADLs, used RW for spinal stenosis     CURRENT FUNCTIONAL STATUS  SLP 6/10  MBS results pending     SLP 6/8  oropharyngeal dysphagia  puree with mildly thick liquids    PT  6/10  bed mobility mod assist   transfers mod assist with Yamila steady     OT 6/7  lethargic, following 75% one step commands  bed mobility max assist x 2  transfers max assist x 2    RECENT LABS/IMAGING  CBC Full  -  ( 10 James 2024 05:29 )  WBC Count : 8.60 K/uL  RBC Count : 4.18 M/uL  Hemoglobin : 11.2 g/dL  Hematocrit : 36.5 %  Platelet Count - Automated : 272 K/uL  Mean Cell Volume : 87.3 fl  Mean Cell Hemoglobin : 26.8 pg  Mean Cell Hemoglobin Concentration : 30.7 gm/dL  Auto Neutrophil # : 5.38 K/uL  Auto Lymphocyte # : 2.33 K/uL  Auto Monocyte # : 0.70 K/uL  Auto Eosinophil # : 0.11 K/uL  Auto Basophil # : 0.05 K/uL  Auto Neutrophil % : 62.6 %  Auto Lymphocyte % : 27.1 %  Auto Monocyte % : 8.1 %  Auto Eosinophil % : 1.3 %  Auto Basophil % : 0.6 %    06-10    150<H>  |  113<H>  |  53<H>  ----------------------------<  133<H>  4.1   |  23  |  0.90    Ca    9.7      10 James 2024 05:29  Phos  3.6     06-09  Mg     2.1     06-09      Urinalysis Basic - ( 10 James 2024 05:29 )    Color: x / Appearance: x / SG: x / pH: x  Gluc: 133 mg/dL / Ketone: x  / Bili: x / Urobili: x   Blood: x / Protein: x / Nitrite: x   Leuk Esterase: x / RBC: x / WBC x   Sq Epi: x / Non Sq Epi: x / Bacteria: x    < from: CT Head No Cont (06.07.24 @ 09:36) >    FINDINGS:    VENTRICLES AND SULCI: Evolving intraventricular hemorrhage within the   bilateral lateral ventricles, third ventricle, and fourth ventricle which   appears similar in size as compared with 6/5/2024. Mildly dilated   ventricular system, similar as compared with 6/5/2024.  INTRA-AXIAL: Stable acute left thalamic capsular/corona radiata   hemorrhage with adjacent edema. No midline shift. There are   periventricular and subcortical white matter hypodensities, consistent   with microvascular type changes.  EXTRA-AXIAL: No mass or fluid collection. Basal cisterns are normal in   appearance.    VISUALIZED SINUSES:  Clear.  TYMPANOMASTOID CAVITIES:  Clear.  VISUALIZED ORBITS: Bilateral lens replacement.  CALVARIUM: Intact.    MISCELLANEOUS: None.      IMPRESSION:  No significant interval change.    < end of copied text >        ALLERGIES  penicillin (Unknown)  quinine (Unknown)      MEDICATIONS   acetaminophen     Tablet .. 650 milliGRAM(s) Oral every 6 hours PRN  atorvastatin 10 milliGRAM(s) Oral at bedtime  baclofen 5 milliGRAM(s) Oral every 8 hours  chlorhexidine 4% Liquid 1 Application(s) Topical daily  enoxaparin Injectable 40 milliGRAM(s) SubCutaneous every 24 hours  gabapentin 300 milliGRAM(s) Oral daily  insulin glargine Injectable (LANTUS) 5 Unit(s) SubCutaneous at bedtime  insulin lispro (ADMELOG) corrective regimen sliding scale   SubCutaneous Before meals and at bedtime  labetalol 100 milliGRAM(s) Oral every 6 hours  losartan 100 milliGRAM(s) Oral daily  ondansetron Injectable 4 milliGRAM(s) IV Push every 6 hours PRN  pantoprazole  Injectable 40 milliGRAM(s) IV Push daily  phenazopyridine 100 milliGRAM(s) Oral every 8 hours PRN  polyethylene glycol 3350 17 Gram(s) Oral daily  senna 2 Tablet(s) Oral at bedtime  trimethoprim  160 mG/sulfamethoxazole 800 mG 1 Tablet(s) Oral two times a day      ----------------------------------------------------------------------------------------  PHYSICAL EXAM  Constitutional - NAD, Comfortable, in bed   Chest - Breathing comfortably  Cardiovascular - S1S2   Extremities - No C/C/E, No calf tenderness   Neurologic Exam -    follows commands                 Cognitive - opens eyes briefly     Communication - no verbal output        Motor - moves all ext      Sensory - Intact to LT     Psychiatric - Mood stable, Affect WNL  ----------------------------------------------------------------------------------------  ASSESSMENT/PLAN  83yFemale h/o HTN, DM spinal stenosis, with functional deficits after IPH  CT with stable hemorrhage   dysphagia, s/p MBS, results pending   urinary retention, continue to monitor, st cath as needed   Pain - Tylenol baclofen, gabapentin   DVT PPX - SCDs lovenox   Rehab - Will continue to follow for ongoing rehab needs and recommendations.    continue bedside therapy, PT/OT/SLP   Recommend ACUTE inpatient rehabilitation for the functional deficits consisting of 3 hours of therapy/day x 2-4 weeks depending on progress at rehabilitation facility, 24 hour RN/daily PMR physician for comorbid medical management. Patient will be able to tolerate 3 hours a day.  
Neurology - Consult Note    -  Spectra: [phone] ([facility]), [phone] ([facility])  -    HPI: Patient VIKTORIYA PHILLIPS is a 83y (1940) woman with a PMHx significant for HTN, HLD, DM, spinal stenosis, who presents w/ CC of abnormal CTH. She is Gujarati speaking and daughter at bedside is translating. She woke up around 4am and ambulated with a walker to the bathroom. She positioned herself on the toilet, however suddenly could not move the R arm/leg or stand back up. Called to family for help. Brought to OSH for further evaluation. Code stroke called and CTH showed "Acute left thalamocapsular/corona radiata parenchymal hemorrhage with associated intraventricular extension. Mild ventriculomegaly which may be in the basis of cerebral volume loss." She was transferred to [facility] for further management. She denies any slurred speech, word finding difficulties, or blurry vision. She endorses mild R sided headache and lightheadedness. She is normally independent in ADLs and uses walker. Lives w/ son. She takes aspirin at home for cardiac health. Repeat CTH at 12pm showed stable hemorrhage with intraventricular extension and mild prominence of ventricular system. Current treatment regimen includes hypertonic saline and nicardipine gtt.      Review of Systems:   CONSTITUTIONAL: No fevers or chills; +headache, lightheadedness  EYES AND ENT: No visual changes or no throat pain   NECK: No pain or stiffness  RESPIRATORY: No hemoptysis or shortness of breath  CARDIOVASCULAR: No chest pain or palpitations  GASTROINTESTINAL: No melena or hematochezia  GENITOURINARY: No dysuria or hematuria  NEUROLOGICAL: +As stated in HPI above  SKIN: No itching, burning, rashes, or lesions   All other review of systems is negative unless indicated above.    Allergies:  penicillin (Unknown)  quinine (Unknown)      PMHx/PSHx/Family Hx: As above, otherwise see below   No pertinent past medical history    HTN (hypertension)    DM (diabetes mellitus)        Social Hx:  Lives w/ son    Medications:  MEDICATIONS  (STANDING):  atorvastatin 10 milliGRAM(s) Oral at bedtime  baclofen 10 milliGRAM(s) Oral daily  chlorhexidine 4% Liquid 1 Application(s) Topical daily  gabapentin 300 milliGRAM(s) Oral daily  insulin lispro (ADMELOG) corrective regimen sliding scale   SubCutaneous every 6 hours  niCARdipine Infusion 5 mG/Hr (25 mL/Hr) IV Continuous <Continuous>  pantoprazole    Tablet 40 milliGRAM(s) Oral before breakfast  polyethylene glycol 3350 17 Gram(s) Oral daily  senna 2 Tablet(s) Oral at bedtime  sodium chloride 3%. 500 milliLiter(s) (50 mL/Hr) IV Continuous <Continuous>    MEDICATIONS  (PRN):  acetaminophen   IVPB .. 1000 milliGRAM(s) IV Intermittent every 6 hours PRN Mild Pain (1 - 3)  ondansetron Injectable 4 milliGRAM(s) IV Push every 6 hours PRN Nausea and/or Vomiting      Vitals:  T(C): 36.7 (06-05-24 @ 15:00), Max: 36.7 (06-05-24 @ 15:00)  HR: 67 (06-05-24 @ 17:00) (58 - 98)  BP: 138/63 (06-05-24 @ 16:00) (110/53 - 210/94)  RR: 20 (06-05-24 @ 17:00) (12 - 26)  SpO2: 100% (06-05-24 @ 17:00) (91% - 100%)    Physical Examination:   General - NAD, elderly female  Cardiovascular - Peripheral pulses palpable, no edema  Eyes - Fundoscopy not performed due to safety precautions in the setting of the COVID-19 pandemic    Neurologic Exam:  Mental status - Awake, Alert, Oriented to person, place, year, not month. Speech fluent, repetition and naming intact. Follows simple and complex commands. Attention/concentration, recent and remote memory (including registration and recall), and fund of knowledge intact    Cranial nerves - PERRLA, VFF, EOMI, face sensation (V1-V3) intact b/l, facial strength intact without asymmetry b/l, hearing intact b/l, palate with symmetric elevation, trapezius 5/5 strength b/l, tongue midline on protrusion with full lateral movement    Motor - Normal bulk throughout. Drift (hitting bed) in RUE and RLE. No drift in L extremities. Increased tone in RLE. 4-/5 strength in R deltoid, biceps, triceps, hip flexor, ankle dorsi/plantarflexion. 5/5 strength in L deltoid, biceps, triceps, hip flexor, ankle dorsi/plantarflexion. B/l 5/5 strength in hand     Sensation - Light touch intact throughout. Extinction to DSS on R.    DTR's -             Biceps      Triceps     Brachioradialis      Patellar    Ankle    Toes/plantar response  R             2+             2+              2+                     1            1                 Up  L              1             1                 1                        1           1                 Down    Coordination - Finger to Nose intact on L, impaired on R.    Gait and station - did not assess due to fall risk and weakness    Labs:                        12.1   14.96 )-----------( 297      ( 05 Jun 2024 07:31 )             38.6     06-05    136  |  101  |  14  ----------------------------<  145<H>  4.5   |  21<L>  |  0.65    Ca    8.8      05 Jun 2024 15:52  Phos  3.6     06-05  Mg     1.6     06-05    TPro  7.5  /  Alb  4.2  /  TBili  0.8  /  DBili  x   /  AST  19  /  ALT  14  /  AlkPhos  63  06-05    CAPILLARY BLOOD GLUCOSE      POCT Blood Glucose.: 126 mg/dL (05 Jun 2024 16:15)    LIVER FUNCTIONS - ( 05 Jun 2024 07:31 )  Alb: 4.2 g/dL / Pro: 7.5 g/dL / ALK PHOS: 63 U/L / ALT: 14 U/L / AST: 19 U/L / GGT: x             PT/INR - ( 05 Jun 2024 07:31 )   PT: 11.6 sec;   INR: 1.11 ratio         PTT - ( 05 Jun 2024 07:31 )  PTT:29.5 sec  CSF:                  Radiology:  CT Head No Cont:  (05 Jun 2024 12:04)  FINDINGS:    Stable acute left thalamocapsular/corona radiata hemorrhage with   intraventricular extension within the lateral, third, and fourth   ventricles. Mild prominence of the ventricular system, not significantly   changed. Mild patchy hypodensities within the periventricular and   subcortical white matter, although nonspecific, likely reflect chronic   microvascular disease.      IMPRESSION:    No interval change.

## 2024-06-12 NOTE — PROGRESS NOTE ADULT - SUBJECTIVE AND OBJECTIVE BOX
Patient seen and examined at bedside.    --Anticoagulation--  enoxaparin Injectable 40 milliGRAM(s) SubCutaneous every 24 hours    T(C): 36.6 (06-11-24 @ 20:00), Max: 36.6 (06-11-24 @ 20:00)  HR: 59 (06-12-24 @ 02:00) (52 - 62)  BP: 156/67 (06-12-24 @ 02:00) (109/59 - 170/81)  RR: 23 (06-12-24 @ 02:00) (20 - 28)  SpO2: 95% (06-12-24 @ 02:00) (93% - 100%)  Wt(kg): --    Exam: Ox3, PERRL, R facial, dysarthric. FC, Left side 5/5, R side 4/5 RUE drift , R neglect

## 2024-06-12 NOTE — CONSULT NOTE ADULT - TIME BILLING
agree with above  83F on ASA81, Gujarrati speaking, h/o DM, HTN, HLD, chronic LBP, p/f inability to move Rt side at 430AM today. 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 Cardio consulted for PAT on tele.    #PAT  -Brief episode of PAT on tele lasting 4 seconds  -No further episodes noted  -Otherwise remains sinus bradycardia on tele 50s  -Continue labetalol as ordered   -Echo nml lv fxn, no wma, no sig valvular dz  -Replete electrolytes prn  -No further cardiac w/u indicated    #HTN  -Cont labetalol, losartan    #L thalamic IPH with IVH  -sp nsicu course  -Mgmt per neuro/neurosx

## 2024-06-12 NOTE — PROVIDER CONTACT NOTE (OTHER) - ASSESSMENT
patient stable. No chest pain or discomfort. vital signs 156/67. heart rate 55, resp 23, pox 94 % in 2 liter
Pt hemodynamically remains stable, no EKG changes noted.

## 2024-06-12 NOTE — PROGRESS NOTE ADULT - SUBJECTIVE AND OBJECTIVE BOX
Patient's familyvisitor at bedside, able to interpret  Still w no BM.  On 2 l, sat as 100%  Comfortable    MEDICATIONS  (STANDING):  atorvastatin 10 milliGRAM(s) Oral at bedtime  baclofen 5 milliGRAM(s) Oral every 8 hours  enoxaparin Injectable 40 milliGRAM(s) SubCutaneous every 24 hours  gabapentin 300 milliGRAM(s) Oral daily  insulin glargine Injectable (LANTUS) 5 Unit(s) SubCutaneous at bedtime  insulin lispro (ADMELOG) corrective regimen sliding scale   SubCutaneous Before meals and at bedtime  labetalol 200 milliGRAM(s) Oral every 6 hours  losartan 100 milliGRAM(s) Oral daily  pantoprazole  Injectable 40 milliGRAM(s) IV Push daily  polyethylene glycol 3350 17 Gram(s) Oral daily  senna 2 Tablet(s) Oral at bedtime  sodium chloride 3%  Inhalation 4 milliLiter(s) Inhalation every 12 hours  trimethoprim  160 mG/sulfamethoxazole 800 mG 1 Tablet(s) Oral two times a day    MEDICATIONS  (PRN):  acetaminophen     Tablet .. 650 milliGRAM(s) Oral every 6 hours PRN Mild Pain (1 - 3)  albuterol/ipratropium for Nebulization 3 milliLiter(s) Nebulizer every 6 hours PRN Shortness of Breath and/or Wheezing  guaiFENesin Oral Liquid (Sugar-Free) 100 milliGRAM(s) Oral every 6 hours PRN Cough  ondansetron Injectable 4 milliGRAM(s) IV Push every 6 hours PRN Nausea and/or Vomiting  phenazopyridine 100 milliGRAM(s) Oral every 8 hours PRN pain    Vital Signs Last 24 Hrs  T(C): 36.6 (11 Jun 2024 20:00), Max: 36.6 (11 Jun 2024 20:00)  T(F): 97.9 (11 Jun 2024 20:00), Max: 97.9 (11 Jun 2024 20:00)  HR: 57 (12 Jun 2024 08:00) (50 - 62)  BP: 153/66 (12 Jun 2024 08:00) (104/51 - 170/81)  BP(mean): 95 (12 Jun 2024 08:00) (72 - 116)  RR: 24 (12 Jun 2024 08:00) (20 - 28)  SpO2: 96% (12 Jun 2024 08:00) (93% - 100%)    PHYSICAL EXAM  Constitutional - NAD, Comfortable, in bed   Chest - Breathing comfortably +O2 by NC at 2 L  Cardiovascular -Joe, S1S2   Extremities - No C/C/E, No calf tenderness   Neurologic Exam -    follows commands                 Cognitive - awake, alert     Communication -responds to questions        Motor - L motor intact, R side poor     Sensory - Intact to LT     Psychiatric - Mood stable, Affect WNL                          11.2   7.42  )-----------( 252      ( 12 Jun 2024 05:58 )             35.8     06-12    147<H>  |  111<H>  |  56<H>  ----------------------------<  118<H>  4.3   |  23  |  1.06    Ca    9.5      12 Jun 2024 05:58    ASSESSMENT/PLAN  83yFemale h/o HTN, DM spinal stenosis, with functional deficits after IPH  CT with stable hemorrhage   PT- ROM, bed mob, transfers, amb w AD  OT- ADLs  SLP - swallowing eval and tx  Skin- turn q2h  Pain - Tylenol baclofen, gabapentin   DVT PPX - SCDs lovenox   Rehab - Acute Rehab- patient requires active and ongoing therapeutic interventions of multiple disciplines and can tolerate 3 hours of therapies x 2-4wks depending on progress at rehabilitation facility. Can actively participate and benefit from  an intensive rehabilitation program. Requires supervision by a rehabilitation physician and a coordinated interdisciplinary approach to providing rehabilitation.

## 2024-06-13 VITALS
HEART RATE: 54 BPM | OXYGEN SATURATION: 97 % | SYSTOLIC BLOOD PRESSURE: 118 MMHG | RESPIRATION RATE: 21 BRPM | DIASTOLIC BLOOD PRESSURE: 58 MMHG

## 2024-06-13 LAB
ANION GAP SERPL CALC-SCNC: 18 MMOL/L — HIGH (ref 5–17)
BASOPHILS # BLD AUTO: 0.07 K/UL — SIGNIFICANT CHANGE UP (ref 0–0.2)
BASOPHILS NFR BLD AUTO: 0.7 % — SIGNIFICANT CHANGE UP (ref 0–2)
BUN SERPL-MCNC: 51 MG/DL — HIGH (ref 7–23)
CALCIUM SERPL-MCNC: 9.9 MG/DL — SIGNIFICANT CHANGE UP (ref 8.4–10.5)
CHLORIDE SERPL-SCNC: 110 MMOL/L — HIGH (ref 96–108)
CO2 SERPL-SCNC: 24 MMOL/L — SIGNIFICANT CHANGE UP (ref 22–31)
CREAT SERPL-MCNC: 0.94 MG/DL — SIGNIFICANT CHANGE UP (ref 0.5–1.3)
EGFR: 61 ML/MIN/1.73M2 — SIGNIFICANT CHANGE UP
EOSINOPHIL # BLD AUTO: 0.11 K/UL — SIGNIFICANT CHANGE UP (ref 0–0.5)
EOSINOPHIL NFR BLD AUTO: 1 % — SIGNIFICANT CHANGE UP (ref 0–6)
GLUCOSE BLDC GLUCOMTR-MCNC: 119 MG/DL — HIGH (ref 70–99)
GLUCOSE BLDC GLUCOMTR-MCNC: 156 MG/DL — HIGH (ref 70–99)
GLUCOSE SERPL-MCNC: 98 MG/DL — SIGNIFICANT CHANGE UP (ref 70–99)
HCT VFR BLD CALC: 40.4 % — SIGNIFICANT CHANGE UP (ref 34.5–45)
HGB BLD-MCNC: 12.1 G/DL — SIGNIFICANT CHANGE UP (ref 11.5–15.5)
IMM GRANULOCYTES NFR BLD AUTO: 0.3 % — SIGNIFICANT CHANGE UP (ref 0–0.9)
LYMPHOCYTES # BLD AUTO: 2.42 K/UL — SIGNIFICANT CHANGE UP (ref 1–3.3)
LYMPHOCYTES # BLD AUTO: 22.9 % — SIGNIFICANT CHANGE UP (ref 13–44)
MCHC RBC-ENTMCNC: 26.4 PG — LOW (ref 27–34)
MCHC RBC-ENTMCNC: 30 GM/DL — LOW (ref 32–36)
MCV RBC AUTO: 88.2 FL — SIGNIFICANT CHANGE UP (ref 80–100)
MONOCYTES # BLD AUTO: 0.92 K/UL — HIGH (ref 0–0.9)
MONOCYTES NFR BLD AUTO: 8.7 % — SIGNIFICANT CHANGE UP (ref 2–14)
NEUTROPHILS # BLD AUTO: 7.01 K/UL — SIGNIFICANT CHANGE UP (ref 1.8–7.4)
NEUTROPHILS NFR BLD AUTO: 66.4 % — SIGNIFICANT CHANGE UP (ref 43–77)
NRBC # BLD: 0 /100 WBCS — SIGNIFICANT CHANGE UP (ref 0–0)
PLATELET # BLD AUTO: 282 K/UL — SIGNIFICANT CHANGE UP (ref 150–400)
POTASSIUM SERPL-MCNC: 4.2 MMOL/L — SIGNIFICANT CHANGE UP (ref 3.5–5.3)
POTASSIUM SERPL-SCNC: 4.2 MMOL/L — SIGNIFICANT CHANGE UP (ref 3.5–5.3)
RBC # BLD: 4.58 M/UL — SIGNIFICANT CHANGE UP (ref 3.8–5.2)
RBC # FLD: 16.5 % — HIGH (ref 10.3–14.5)
SODIUM SERPL-SCNC: 152 MMOL/L — HIGH (ref 135–145)
WBC # BLD: 10.56 K/UL — HIGH (ref 3.8–10.5)
WBC # FLD AUTO: 10.56 K/UL — HIGH (ref 3.8–10.5)

## 2024-06-13 PROCEDURE — 97162 PT EVAL MOD COMPLEX 30 MIN: CPT

## 2024-06-13 PROCEDURE — 83735 ASSAY OF MAGNESIUM: CPT

## 2024-06-13 PROCEDURE — 92526 ORAL FUNCTION THERAPY: CPT

## 2024-06-13 PROCEDURE — 99285 EMERGENCY DEPT VISIT HI MDM: CPT | Mod: 25

## 2024-06-13 PROCEDURE — 97166 OT EVAL MOD COMPLEX 45 MIN: CPT

## 2024-06-13 PROCEDURE — 85027 COMPLETE CBC AUTOMATED: CPT

## 2024-06-13 PROCEDURE — 93970 EXTREMITY STUDY: CPT

## 2024-06-13 PROCEDURE — 80048 BASIC METABOLIC PNL TOTAL CA: CPT

## 2024-06-13 PROCEDURE — 85576 BLOOD PLATELET AGGREGATION: CPT

## 2024-06-13 PROCEDURE — 85025 COMPLETE CBC W/AUTO DIFF WBC: CPT

## 2024-06-13 PROCEDURE — 93306 TTE W/DOPPLER COMPLETE: CPT

## 2024-06-13 PROCEDURE — 97530 THERAPEUTIC ACTIVITIES: CPT

## 2024-06-13 PROCEDURE — 86900 BLOOD TYPING SEROLOGIC ABO: CPT

## 2024-06-13 PROCEDURE — 36415 COLL VENOUS BLD VENIPUNCTURE: CPT

## 2024-06-13 PROCEDURE — 96375 TX/PRO/DX INJ NEW DRUG ADDON: CPT

## 2024-06-13 PROCEDURE — 80061 LIPID PANEL: CPT

## 2024-06-13 PROCEDURE — 71045 X-RAY EXAM CHEST 1 VIEW: CPT

## 2024-06-13 PROCEDURE — 92610 EVALUATE SWALLOWING FUNCTION: CPT

## 2024-06-13 PROCEDURE — 70450 CT HEAD/BRAIN W/O DYE: CPT | Mod: MC

## 2024-06-13 PROCEDURE — 97535 SELF CARE MNGMENT TRAINING: CPT

## 2024-06-13 PROCEDURE — 96374 THER/PROPH/DIAG INJ IV PUSH: CPT

## 2024-06-13 PROCEDURE — 97110 THERAPEUTIC EXERCISES: CPT

## 2024-06-13 PROCEDURE — 86901 BLOOD TYPING SEROLOGIC RH(D): CPT

## 2024-06-13 PROCEDURE — 82962 GLUCOSE BLOOD TEST: CPT

## 2024-06-13 PROCEDURE — 87186 SC STD MICRODIL/AGAR DIL: CPT

## 2024-06-13 PROCEDURE — 99239 HOSP IP/OBS DSCHRG MGMT >30: CPT

## 2024-06-13 PROCEDURE — 85610 PROTHROMBIN TIME: CPT

## 2024-06-13 PROCEDURE — 85730 THROMBOPLASTIN TIME PARTIAL: CPT

## 2024-06-13 PROCEDURE — 81001 URINALYSIS AUTO W/SCOPE: CPT

## 2024-06-13 PROCEDURE — 87086 URINE CULTURE/COLONY COUNT: CPT

## 2024-06-13 PROCEDURE — 80053 COMPREHEN METABOLIC PANEL: CPT

## 2024-06-13 PROCEDURE — 86850 RBC ANTIBODY SCREEN: CPT

## 2024-06-13 PROCEDURE — 84443 ASSAY THYROID STIM HORMONE: CPT

## 2024-06-13 PROCEDURE — 83036 HEMOGLOBIN GLYCOSYLATED A1C: CPT

## 2024-06-13 PROCEDURE — 84100 ASSAY OF PHOSPHORUS: CPT

## 2024-06-13 PROCEDURE — 94640 AIRWAY INHALATION TREATMENT: CPT

## 2024-06-13 PROCEDURE — 92611 MOTION FLUOROSCOPY/SWALLOW: CPT

## 2024-06-13 PROCEDURE — 74230 X-RAY XM SWLNG FUNCJ C+: CPT

## 2024-06-13 RX ORDER — ENOXAPARIN SODIUM 100 MG/ML
0 INJECTION SUBCUTANEOUS
Qty: 0 | Refills: 0 | DISCHARGE
Start: 2024-06-13

## 2024-06-13 RX ADMIN — Medication 200 MILLIGRAM(S): at 11:32

## 2024-06-13 RX ADMIN — POLYETHYLENE GLYCOL 3350 17 GRAM(S): 17 POWDER, FOR SOLUTION ORAL at 11:32

## 2024-06-13 RX ADMIN — Medication 650 MILLIGRAM(S): at 00:07

## 2024-06-13 RX ADMIN — GABAPENTIN 300 MILLIGRAM(S): 400 CAPSULE ORAL at 11:31

## 2024-06-13 RX ADMIN — Medication 5 MILLIGRAM(S): at 05:12

## 2024-06-13 RX ADMIN — Medication 1 TABLET(S): at 05:12

## 2024-06-13 RX ADMIN — Medication 200 MILLIGRAM(S): at 08:11

## 2024-06-13 RX ADMIN — Medication 2: at 11:32

## 2024-06-13 RX ADMIN — SODIUM CHLORIDE 4 MILLILITER(S): 9 INJECTION INTRAMUSCULAR; INTRAVENOUS; SUBCUTANEOUS at 05:13

## 2024-06-13 RX ADMIN — PANTOPRAZOLE SODIUM 40 MILLIGRAM(S): 20 TABLET, DELAYED RELEASE ORAL at 11:32

## 2024-06-13 RX ADMIN — LOSARTAN POTASSIUM 100 MILLIGRAM(S): 100 TABLET, FILM COATED ORAL at 08:11

## 2024-06-13 NOTE — PROGRESS NOTE ADULT - PROVIDER SPECIALTY LIST ADULT
NSICU
Neurology
Neurosurgery
Podiatry
NSICU
Neurology
Neurosurgery
Rehab Medicine
Rehab Medicine
Cardiology
NSICU
Neurology
Neurosurgery
Neurosurgery
NSICU
NSICU
Neurosurgery

## 2024-06-13 NOTE — PROGRESS NOTE ADULT - NS ATTEND AMEND GEN_ALL_CORE FT
I saw and examined the patient, reviewed diagnostic studies, and reviewed images personally. I agree with NP/PA’s history, exam, orders placed, and plan of care. Total care time spent, 35 minutes. Medical issues needing to be addressed include: nontraumatic intracerebral hemorrhage of L thalamus w/ intraventricular extension, perihematomal edema, R hemiparesis, dysarthria/dysphagia, UTI. Bactrim. SBP goal <140, titrate PO antihypertensive meds to meet goal. Pt with mild nausea and no bowel movement since 7th. Enema. Encourage PO intake. Dispo planning in progress.
I saw and examined the patient, reviewed diagnostic studies, and reviewed images personally. I agree with NP/PA’s history, exam, orders placed, and plan of care. Total care time spent, 35 minutes. Medical issues needing to be addressed include: nontraumatic intracerebral hemorrhage of L thalamus w/ intraventricular extension, perihematomal edema, R hemiparesis, dysarthria/dysphagia, UTI. Bactrim. SBP goal <140, titrate PO antihypertensive meds to meet goal. Pt with mild nausea and no bowel movement since 7th. Enema. Encourage PO intake. Dispo planning in progress.
I saw and examined the patient, reviewed diagnostic studies, and reviewed images personally. I agree with NP/PA’s history, exam, orders placed, and plan of care. Total care time spent, 35 minutes. Medical issues needing to be addressed include: nontraumatic intracerebral hemorrhage of L thalamus w/ intraventricular extension, perihematomal edema, R hemiparesis, dysarthria/dysphagia, UTI. Bactrim. SBP goal <140, titrate PO antihypertensive meds to meet goal. After MBS, diet advanced. Family concerned about her decreased appetite. Ordered Ensure as supplement. CTH today, appears stable, likely DC tomorrow.
I saw and examined the patient, reviewed diagnostic studies, and reviewed images personally. I agree with NP/PA’s history, exam, orders placed, and plan of care. Total care time spent, 35 minutes. Medical issues needing to be addressed include: nontraumatic intracerebral hemorrhage of L thalamus w/ intraventricular extension, perihematomal edema, R hemiparesis, dysarthria/dysphagia, UTI. Cont Bactrim, SBP goal <140, titrate PO antihypertensive meds to meet goal. MBS today,

## 2024-06-13 NOTE — PROGRESS NOTE ADULT - SUBJECTIVE AND OBJECTIVE BOX
THE PATIENT WAS SEEN AND EXAMINED BY ME WITH THE HOUSESTAFF AND STROKE TEAM DURING MORNING ROUNDS.   HPI:  83F on ASA81, Gujarrati speaking, h/o DM, HTN, HLD, chronic LBP, p/f inability to move Rt side at 430AM today. 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 CTA negative. SBP in the 180s. Plts/coags wnl. Exam: EOV, awake, Ox3, brisk FC, Lt side 5/5, RUE 4-/5, RLE 3/5  (05 Jun 2024 07:26)      ROS: Otherwise negative.    SUBJECTIVE: No events overnight.  No new neurologic complaints.  Patient family noting absent bowel movement for the last few days and decreased appetite. Patient was given enema and had liquid stools.     acetaminophen     Tablet .. 650 milliGRAM(s) Oral every 6 hours PRN  albuterol/ipratropium for Nebulization 3 milliLiter(s) Nebulizer every 6 hours PRN  atorvastatin 10 milliGRAM(s) Oral at bedtime  baclofen 5 milliGRAM(s) Oral every 8 hours  enoxaparin Injectable 40 milliGRAM(s) SubCutaneous every 24 hours  gabapentin 300 milliGRAM(s) Oral daily  guaiFENesin Oral Liquid (Sugar-Free) 100 milliGRAM(s) Oral every 6 hours PRN  insulin glargine Injectable (LANTUS) 5 Unit(s) SubCutaneous at bedtime  insulin lispro (ADMELOG) corrective regimen sliding scale   SubCutaneous Before meals and at bedtime  labetalol 200 milliGRAM(s) Oral every 6 hours  losartan 100 milliGRAM(s) Oral daily  ondansetron Injectable 4 milliGRAM(s) IV Push every 6 hours PRN  pantoprazole  Injectable 40 milliGRAM(s) IV Push daily  phenazopyridine 100 milliGRAM(s) Oral every 8 hours PRN  polyethylene glycol 3350 17 Gram(s) Oral daily  senna 2 Tablet(s) Oral at bedtime  sodium chloride 3%  Inhalation 4 milliLiter(s) Inhalation every 12 hours  trimethoprim  160 mG/sulfamethoxazole 800 mG 1 Tablet(s) Oral two times a day      Vital Signs Last 24 Hrs  T(C): 36.5 (13 Jun 2024 04:00), Max: 36.9 (13 Jun 2024 00:00)  T(F): 97.7 (13 Jun 2024 04:00), Max: 98.5 (13 Jun 2024 00:00)  HR: 58 (13 Jun 2024 06:00) (52 - 60)  BP: 150/69 (13 Jun 2024 06:00) (128/61 - 154/70)  BP(mean): 94 (13 Jun 2024 02:00) (88 - 100)  RR: 24 (13 Jun 2024 06:00) (24 - 34)  SpO2: 96% (13 Jun 2024 06:00) (90% - 100%)    Parameters below as of 13 Jun 2024 06:00  Patient On (Oxygen Delivery Method): nasal cannula  O2 Flow (L/min): 2      General: No acute distress    NEUROLOGICAL EXAM:   Mental status: Eyes open, awake and alert. Attentive to examiner. AOx2. No neglect. Follows simple commands.   Cranial Nerves: Mild right facial droop. Mild dysarthria. No aphasia.   Motor exam: Normal tone. RUE some effort 3/5, RLE some effort 4/5, with drift. 5/5 LUE, 5/5 LLE.   Sensation: Intact to light touch   Coordination/ Gait: Unable to asses at this time       LABS:                        11.2   7.42  )-----------( 252      ( 12 Jun 2024 05:58 )             35.8    06-12    147<H>  |  111<H>  |  56<H>  ----------------------------<  118<H>  4.3   |  23  |  1.06    Ca    9.5      12 Jun 2024 05:58          IMAGING:    < from: CT Head No Cont (06.11.24 @ 14:31) >  IMPRESSION: No change in left thalamic capsular hemorrhage with   intraventricular extension compared with 6/7/2024.    < end of copied text >      CT Brain Stroke Protocol (06.05.24 @ 05:29)  COMPARISON: None.    PROCEDURE:  Noncontrast CT of the Head was performed from the skull base to the   vertex. Coronal and Sagittal reformats were obtained.    FINDINGS:    Acute 2.6 x 2.0 x 2.6 cm left thalamocapsular/corona radiata hemorrhage   with intraventricular extension within the lateral, third, and fourth   ventricles. Mild prominence of the ventricular system, which may be on   the basis of cerebral volume loss. Mild patchy hypodensities within the   periventricular and subcortical white matter, although nonspecific,   likely reflect chronic microvascular disease.    Paranasal sinuses are clear. Under pneumatized age and of the bilateral   mastoid air cells. Status post bilateral ocular lens replacement.   Calvarium is intact.    IMPRESSION:    Acute left thalamocapsular/corona radiata parenchymal hemorrhage with  associated intraventricular extension. Mild ventriculomegaly which may be   in the basis of cerebral volume loss.    CT Angio Brain Stroke Protocol  w/ IV Cont (06.05.24 @ 05:39)  IMPRESSION:    CTA Neck: No significant flow-limiting stenosis or evidence of acute   dissection within the cervical carotid or vertebral arteries.    CTA Head: No proximal large vessel occlusion, significant stenosis, or   evidence of intracranial aneurysm.    CT Perfusion: Nondiagnostic.      CT Head No Cont (06.07.24 @ 09:36)  IMPRESSION:  No significant interval change.    CT Head No Cont (06.05.24 @ 12:04)  IMPRESSION:  No interval change.

## 2024-06-13 NOTE — PROGRESS NOTE ADULT - SUBJECTIVE AND OBJECTIVE BOX
CARDIOLOGY FOLLOW UP - Dr. Foster  DATE OF SERVICE: 6/13/24    CC  No acute cv events     REVIEW OF SYSTEMS:  UTO    PHYSICAL EXAM:  T(C): 36.7 (06-13-24 @ 08:00), Max: 36.9 (06-13-24 @ 00:00)  HR: 54 (06-13-24 @ 10:00) (52 - 60)  BP: 126/60 (06-13-24 @ 10:00) (126/60 - 167/70)  RR: 22 (06-13-24 @ 10:00) (22 - 34)  SpO2: 99% (06-13-24 @ 10:00) (90% - 100%)  Wt(kg): --  I&O's Summary    12 Jun 2024 07:01  -  13 Jun 2024 07:00  --------------------------------------------------------  IN: 240 mL / OUT: 1000 mL / NET: -760 mL        Appearance: Normal	  Cardiovascular: Normal S1 S2,RRR, No JVD, No murmurs  Respiratory: Lungs clear to auscultation b/l   Gastrointestinal:  Soft, Non-tender, + BS	  Extremities: Normal range of motion, No clubbing, cyanosis or edema      Home Medications:  acetaminophen 325 mg oral tablet: 2 tab(s) orally every 6 hours As needed Mild Pain (1 - 3) (11 Jun 2024 10:30)  baclofen 5 mg oral tablet: 1 tab(s) orally every 8 hours (11 Jun 2024 10:30)  enoxaparin:  (13 Jun 2024 11:25)  gabapentin 300 mg oral tablet: 1 tab(s) orally once a day (05 Jun 2024 09:06)  insulin lispro 100 units/mL injectable solution: injectable 4 times a day (before meals and at bedtime) 2 Unit(s) if Glucose 151 - 200  4 Unit(s) if Glucose 201 - 250  6 Unit(s) if Glucose 251 - 300  8 Unit(s) if Glucose 301 - 350  10 Unit(s) if Glucose 351 - 400  12 Unit(s) if Glucose Greater Than 400 (11 Jun 2024 10:30)  labetalol 200 mg oral tablet: 1 tab(s) orally every 6 hours (11 Jun 2024 10:30)  Lipitor 10 mg oral tablet: 1 tab(s) orally once a day (at bedtime) (05 Jun 2024 09:06)  losartan 100 mg oral tablet: 1 tab(s) orally once a day (11 Jun 2024 10:30)  omeprazole 20 mg oral delayed release capsule: 1 cap(s) orally once a day (05 Jun 2024 09:06)  phenazopyridine 100 mg oral tablet: 1 tab(s) orally every 8 hours As needed pain (11 Jun 2024 10:30)  polyethylene glycol 3350 oral powder for reconstitution: 17 gram(s) orally once a day (11 Jun 2024 10:30)  senna leaf extract oral tablet: 2 tab(s) orally once a day (at bedtime) (11 Jun 2024 10:30)  sulfamethoxazole-trimethoprim 800 mg-160 mg oral tablet: 1 tab(s) orally 2 times a day (11 Jun 2024 10:30)      MEDICATIONS  (STANDING):  atorvastatin 10 milliGRAM(s) Oral at bedtime  baclofen 5 milliGRAM(s) Oral every 8 hours  enoxaparin Injectable 40 milliGRAM(s) SubCutaneous every 24 hours  gabapentin 300 milliGRAM(s) Oral daily  insulin glargine Injectable (LANTUS) 5 Unit(s) SubCutaneous at bedtime  insulin lispro (ADMELOG) corrective regimen sliding scale   SubCutaneous Before meals and at bedtime  labetalol 200 milliGRAM(s) Oral every 6 hours  losartan 100 milliGRAM(s) Oral daily  pantoprazole  Injectable 40 milliGRAM(s) IV Push daily  polyethylene glycol 3350 17 Gram(s) Oral daily  senna 2 Tablet(s) Oral at bedtime  sodium chloride 3%  Inhalation 4 milliLiter(s) Inhalation every 12 hours  trimethoprim  160 mG/sulfamethoxazole 800 mG 1 Tablet(s) Oral two times a day      TELEMETRY: SR 	    ECG:  	  RADIOLOGY:   DIAGNOSTIC TESTING:  [ ] Echocardiogram:  [ ]  Catheterization:  [ ] Stress Test:    OTHER: 	    LABS:	 	                            12.1   10.56 )-----------( 282      ( 13 Jun 2024 07:00 )             40.4     06-13    152<H>  |  110<H>  |  51<H>  ----------------------------<  98  4.2   |  24  |  0.94    Ca    9.9      13 Jun 2024 07:00

## 2024-06-13 NOTE — PROGRESS NOTE ADULT - ASSESSMENT
A/p  83F on ASA81, Gujarrati speaking, h/o DM, HTN, HLD, chronic LBP, p/f inability to move Rt side at 430AM today. 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 Cardio consulted for PAT on tele.    #PAT  -Brief episode of PAT on tele lasting 4 seconds 6/12  -No further episodes noted  -Otherwise remains sinus bradycardia on tele 50s  -Continue labetalol as ordered   -Echo nml lv fxn, no wma, no sig valvular dz  -Replete electrolytes prn  -No further cardiac w/u indicated    #HTN  -Cont labetalol, losartan    #L thalamic IPH with IVH  -sp nsicu course  -Mgmt per neuro/neurosx

## 2024-06-13 NOTE — PROGRESS NOTE ADULT - ASSESSMENT
83-year-old female with PMHx HTN, HLD, DM, spinal stenosis, who presents w/ CC of abnormal CTH at outside hospital, showing "acute left thalamocapsular / corona radiata parenchymal hemorrhage with associated intraventricular extension," associated with right hemiparesis.    IMPRESSION: Right hemiparesis, c/w L thalamic IPH with IVH. The most likely etiology is hypertensive.     NEURO: Neurologically improved since admission, neuroq2. Continue close monitoring for neurologic deterioration. Continue to monitor blood pressure. BP goal < 150/90. Physical therapy/OT: AR. follow up stability CT brain obtained before discharge and was stable.     ANTITHROMBOTIC THERAPY: none in the setting of IPH    PULMONARY: CXR clear 06/07, protecting airway, saturating well     CARDIOVASCULAR: TTE 6/5: EF 82%, no significant valvular disease, normal LA; c/w cardiac monitoring                              SBP goal: < 150/90 continue on labetalol 200 mg q 6 hours (hold for P<55), continue with losartan 100 daily    GASTROINTESTINAL: Oropharyngeal dysphagia on S/S evaluation; s/p MBS with upgrade in diet     Diet: soft and bite sized, having decreased appetite and decreased bowel movement but improved with enema     RENAL: BUN & Cr elevated will continue to monitor     Na Goal: 135-145, previously on hypertonics     Harden: NO, straight cath for residual urine     continue Bactrim for total of 7 days started June 8 for UTI - should stop June 14    ENDOCRINOLOGY: currently on sliding scale insuling     HEMATOLOGY: H/H stable, platelets 264,  Jesu US: negative for DVT     DVT ppx: Lovenox     ID: afebrile, no leukocytosis     OTHER: Discussed plan with family at bedside.  Na slowly downtrending.      DISPOSITION: Acute inpatient rehab, stable for discharge      CORE MEASURES:        Admission NIHSS: 7     TPA: NO     LDL/HDL: 33/57     Depression Screen: N/A     Statin Therapy: YES      Dysphagia Screen: PASS     Smoking: NO      Afib: NO      Stroke Education: YES    Obtain screening lower extremity venous ultrasound in patients who meet 1 or more of the following criteria as patient is high risk for DVT/PE on admission:   [] History of DVT/PE  [] Hypercoagulable states (Factor V Leiden, Cancer, OCP, etc. )  [] Prolonged immobility (hemiplegia/hemiparesis/post operative or any other extended immobilization)  [] Transferred from outside facility (Rehab or Long term care)  [] Age </= to 50    [facility] 83-year-old female with PMHx HTN, HLD, DM, spinal stenosis, who presents w/ CC of abnormal CTH at outside hospital, showing "acute left thalamocapsular / corona radiata parenchymal hemorrhage with associated intraventricular extension," associated with right hemiparesis.    IMPRESSION: Right hemiparesis, c/w L thalamic IPH with IVH. The most likely etiology is hypertensive.     NEURO: Neurologically improved since admission, neuroq2. Continue close monitoring for neurologic deterioration. Continue to monitor blood pressure. BP goal < 150/90. Physical therapy/OT: AR.     ANTITHROMBOTIC THERAPY: none in the setting of IPH    PULMONARY: CXR clear 06/07, protecting airway, saturating well     CARDIOVASCULAR: TTE 6/5: EF 82%, no significant valvular disease, normal LA; c/w cardiac monitoring                              SBP goal: < 150/90 continue on labetalol 200 mg q 6 hours (hold for P<55), continue with losartan 100 daily    GASTROINTESTINAL: Oropharyngeal dysphagia on S/S evaluation; s/p MBS with upgrade in diet     Diet: soft and bite sized, having decreased appetite and decreased bowel movement but improved with enema     RENAL: BUN & Cr elevated will continue to monitor     Na Goal: 135-145, previously on hypertonics     Harden: NO, straight cath for residual urine     continue Bactrim for total of 7 days started June 8 for UTI - should stop June 14    ENDOCRINOLOGY: currently on sliding scale insuling     HEMATOLOGY: H/H stable, platelets 264,  Jesu US: negative for DVT     DVT ppx: Lovenox     ID: afebrile, no leukocytosis     OTHER: Discussed plan with family at bedside.  Na slowly downtrending.      DISPOSITION: Acute inpatient rehab, stable for discharge      CORE MEASURES:        Admission NIHSS: 7     TPA: NO     LDL/HDL: 33/57     Depression Screen: N/A     Statin Therapy: YES      Dysphagia Screen: PASS     Smoking: NO      Afib: NO      Stroke Education: YES    Obtain screening lower extremity venous ultrasound in patients who meet 1 or more of the following criteria as patient is high risk for DVT/PE on admission:   [] History of DVT/PE  [] Hypercoagulable states (Factor V Leiden, Cancer, OCP, etc. )  [] Prolonged immobility (hemiplegia/hemiparesis/post operative or any other extended immobilization)  [] Transferred from outside facility (Rehab or Long term care)  [] Age </= to 50    [facility] 06[facility]neg 83-year-old female with PMHx HTN, HLD, DM, spinal stenosis, who presents w/ CC of abnormal CTH at outside hospital, showing "acute left thalamocapsular / corona radiata parenchymal hemorrhage with associated intraventricular extension," associated with right hemiparesis.    IMPRESSION: Right hemiparesis, c/w L thalamic IPH with IVH. The most likely etiology is hypertensive.     NEURO: Neurologically improved since admission, neuroq2. Continue close monitoring for neurologic deterioration. Continue to monitor blood pressure. BP goal < 150/90. Physical therapy/OT: AR.     ANTITHROMBOTIC THERAPY: none in the setting of IPH    PULMONARY: CXR clear 06/07, protecting airway, saturating well     CARDIOVASCULAR: TTE 6/5: EF 82%, no significant valvular disease, normal LA; c/w cardiac monitoring                              SBP goal: < 150/90 continue on labetalol 200 mg q 6 hours (hold for P<55), continue with losartan 100 daily    GASTROINTESTINAL: Oropharyngeal dysphagia on S/S evaluation; s/p MBS with upgrade in diet     Diet: soft and bite sized, having decreased appetite and decreased bowel movement but improved with enema     RENAL: BUN & Cr elevated will continue to monitor     Na Goal: 135-145, previously on hypertonics, started on [id number] to normoatremia     Harden: NO, straight cath for residual urine     continue Bactrim for total of 7 days started June 8 for UTI - should stop June 14    ENDOCRINOLOGY: currently on sliding scale insuling     HEMATOLOGY: H/H stable, platelets 264,  Jesu US: negative for DVT     DVT ppx: Lovenox     ID: afebrile, no leukocytosis     OTHER: Discussed plan with family at bedside.  Na slowly downtrending.      DISPOSITION: Acute inpatient rehab, stable for discharge      CORE MEASURES:        Admission NIHSS: 7     TPA: NO     LDL/HDL: 33/57     Depression Screen: N/A     Statin Therapy: YES      Dysphagia Screen: PASS     Smoking: NO      Afib: NO      Stroke Education: YES    Obtain screening lower extremity venous ultrasound in patients who meet 1 or more of the following criteria as patient is high risk for DVT/PE on admission:   [] History of DVT/PE  [] Hypercoagulable states (Factor V Leiden, Cancer, OCP, etc. )  [] Prolonged immobility (hemiplegia/hemiparesis/post operative or any other extended immobilization)  [] Transferred from outside facility (Rehab or Long term care)  [] Age </= to 50    [facility] 06[facility]neg

## 2024-06-14 DIAGNOSIS — I61.5 NONTRAUMATIC INTRACEREBRAL HEMORRHAGE, INTRAVENTRICULAR: ICD-10-CM

## 2024-06-14 DIAGNOSIS — I62.9 NONTRAUMATIC INTRACRANIAL HEMORRHAGE, UNSPECIFIED: ICD-10-CM

## 2024-09-06 ENCOUNTER — APPOINTMENT (OUTPATIENT)
Dept: ENDOCRINOLOGY | Facility: CLINIC | Age: 84
End: 2024-09-06

## 2024-09-26 NOTE — DISCHARGE NOTE PROVIDER - NSDCMRMEDTOKEN_GEN_ALL_CORE_FT
Return in about 2 months (around 12/2/2024).   1:10 PM                 acetaminophen 325 mg oral tablet: 2 tab(s) orally every 6 hours As needed Mild Pain (1 - 3)  baclofen 5 mg oral tablet: 1 tab(s) orally every 8 hours  enoxaparin: 40mg sq once a day for dvt ppx  gabapentin 300 mg oral tablet: 1 tab(s) orally once a day  insulin lispro 100 units/mL injectable solution: injectable 4 times a day (before meals and at bedtime) 2 Unit(s) if Glucose 151 - 200  4 Unit(s) if Glucose 201 - 250  6 Unit(s) if Glucose 251 - 300  8 Unit(s) if Glucose 301 - 350  10 Unit(s) if Glucose 351 - 400  12 Unit(s) if Glucose Greater Than 400  labetalol 200 mg oral tablet: 1 tab(s) orally every 6 hours  Lipitor 10 mg oral tablet: 1 tab(s) orally once a day (at bedtime)  losartan 100 mg oral tablet: 1 tab(s) orally once a day  omeprazole 20 mg oral delayed release capsule: 1 cap(s) orally once a day  phenazopyridine 100 mg oral tablet: 1 tab(s) orally every 8 hours As needed pain  polyethylene glycol 3350 oral powder for reconstitution: 17 gram(s) orally once a day  senna leaf extract oral tablet: 2 tab(s) orally once a day (at bedtime)  sulfamethoxazole-trimethoprim 800 mg-160 mg oral tablet: 1 tab(s) orally 2 times a day   acetaminophen 325 mg oral tablet: 2 tab(s) orally every 6 hours As needed Mild Pain (1 - 3)  baclofen 5 mg oral tablet: 1 tab(s) orally every 8 hours  enoxaparin:   gabapentin 300 mg oral tablet: 1 tab(s) orally once a day  insulin lispro 100 units/mL injectable solution: injectable 4 times a day (before meals and at bedtime) 2 Unit(s) if Glucose 151 - 200  4 Unit(s) if Glucose 201 - 250  6 Unit(s) if Glucose 251 - 300  8 Unit(s) if Glucose 301 - 350  10 Unit(s) if Glucose 351 - 400  12 Unit(s) if Glucose Greater Than 400  labetalol 200 mg oral tablet: 1 tab(s) orally every 6 hours  Lipitor 10 mg oral tablet: 1 tab(s) orally once a day (at bedtime)  losartan 100 mg oral tablet: 1 tab(s) orally once a day  omeprazole 20 mg oral delayed release capsule: 1 cap(s) orally once a day  phenazopyridine 100 mg oral tablet: 1 tab(s) orally every 8 hours As needed pain  polyethylene glycol 3350 oral powder for reconstitution: 17 gram(s) orally once a day  senna leaf extract oral tablet: 2 tab(s) orally once a day (at bedtime)  sulfamethoxazole-trimethoprim 800 mg-160 mg oral tablet: 1 tab(s) orally 2 times a day

## 2024-12-10 NOTE — DIETITIAN INITIAL EVALUATION ADULT - FLUID ACCUMULATION
85-year-old female presenting for follow-up on chronic concerns.    DMII  Stable and doing well on dietary modifications alone.    HTN  Stable, tolerates current regimen well.  Follows with cardiology.    HLD  Stable, tolerating current regimen well.    Anxiety  Stable, tolerating current regimen well.    Hypothyroidism  Stable, tolerating current regimen well.    Osteopenia  Stable, tolerating current regimen well.    Essential tremor  Stable, tolerating current regimen well.    12 point ROS reviewed and negative other than as stated in HPI      General: Alert, oriented, pleasant, in no acute distress  HEENT:      Head: normocephalic, atraumatic;      eyes: EOMI, no scleral icterus;   Neck: soft, supple, non-tender, no masses appreciated  CV: Heart with regular rate and rhythm, normal S1/S2, 3-4 out of 6 systolic murmur  Lungs: CTAB without wheezing, rhonchi or rales; good respiratory effort, no increased work of breathing  Neuro: Cranial nerves grossly intact; alert and oriented  Psych: Appropriate mood and affect    #DMII  -Last A1c 5.6, 07/2024  -UTD with ophthalmology   - Diabetic foot exam negative in office  - Diet controlled  -Repeat A1c    #HTN #systolic murmur  - Above goal in office  - Currently on amlodipine 5 mg daily, losartan-HCTZ 100-25 mg daily  - Following with cardiology, defer any changes to them, is seeing them after this appointment, is getting echo done as well    #HLD #CAD  -Currently on rosuvastatin 40 mg daily, ezetimibe 10 mg daily  - Repeat lipid panel    #Anxiety   -Stable, doing well  - Continue escitalopram 10 mg daily    #hypothyroidism  - TSH has been low, elevated T4, has decreased down to levothyroxine 88 mg daily  - Repeat TSH    #Osteopenia  -Currently on alendronate 70 mg weekly    #Essential tremor  -Currently on primidone and propranolol  - Following with neurology    F/U 6 months, Medicare at that time, sooner if needed    Chris D'Amico, DO  
none noted